# Patient Record
Sex: MALE | Race: BLACK OR AFRICAN AMERICAN | Employment: FULL TIME | URBAN - METROPOLITAN AREA
[De-identification: names, ages, dates, MRNs, and addresses within clinical notes are randomized per-mention and may not be internally consistent; named-entity substitution may affect disease eponyms.]

---

## 2017-08-22 ENCOUNTER — HOSPITAL ENCOUNTER (INPATIENT)
Age: 47
LOS: 3 days | Discharge: HOME | DRG: 872 | End: 2017-08-25
Attending: STUDENT IN AN ORGANIZED HEALTH CARE EDUCATION/TRAINING PROGRAM | Admitting: INTERNAL MEDICINE
Payer: COMMERCIAL

## 2017-08-22 DIAGNOSIS — R53.83 LETHARGY: ICD-10-CM

## 2017-08-22 DIAGNOSIS — A04.72 CLOSTRIDIUM DIFFICILE DIARRHEA: ICD-10-CM

## 2017-08-22 DIAGNOSIS — D69.6 THROMBOCYTOPENIA (HCC): Primary | ICD-10-CM

## 2017-08-22 DIAGNOSIS — R19.7 DIARRHEA, UNSPECIFIED TYPE: ICD-10-CM

## 2017-08-22 DIAGNOSIS — D61.818 PANCYTOPENIA (HCC): ICD-10-CM

## 2017-08-22 LAB
ALBUMIN SERPL-MCNC: 3.6 G/DL (ref 3.5–5)
ALBUMIN/GLOB SERPL: 0.9 {RATIO} (ref 1.1–2.2)
ALP SERPL-CCNC: 122 U/L (ref 45–117)
ALT SERPL-CCNC: 52 U/L (ref 12–78)
ANION GAP SERPL CALC-SCNC: 10 MMOL/L (ref 5–15)
AST SERPL-CCNC: 116 U/L (ref 15–37)
BILIRUB SERPL-MCNC: 1.9 MG/DL (ref 0.2–1)
BUN SERPL-MCNC: 12 MG/DL (ref 6–20)
BUN/CREAT SERPL: 11 (ref 12–20)
CALCIUM SERPL-MCNC: 8.8 MG/DL (ref 8.5–10.1)
CHLORIDE SERPL-SCNC: 98 MMOL/L (ref 97–108)
CO2 SERPL-SCNC: 29 MMOL/L (ref 21–32)
CREAT SERPL-MCNC: 1.09 MG/DL (ref 0.7–1.3)
ETHANOL SERPL-MCNC: <10 MG/DL
GLOBULIN SER CALC-MCNC: 4 G/DL (ref 2–4)
GLUCOSE SERPL-MCNC: 88 MG/DL (ref 65–100)
POTASSIUM SERPL-SCNC: 3.7 MMOL/L (ref 3.5–5.1)
PROT SERPL-MCNC: 7.6 G/DL (ref 6.4–8.2)
SODIUM SERPL-SCNC: 137 MMOL/L (ref 136–145)

## 2017-08-22 PROCEDURE — 36415 COLL VENOUS BLD VENIPUNCTURE: CPT | Performed by: EMERGENCY MEDICINE

## 2017-08-22 PROCEDURE — 85025 COMPLETE CBC W/AUTO DIFF WBC: CPT | Performed by: EMERGENCY MEDICINE

## 2017-08-22 PROCEDURE — 74011250636 HC RX REV CODE- 250/636: Performed by: STUDENT IN AN ORGANIZED HEALTH CARE EDUCATION/TRAINING PROGRAM

## 2017-08-22 PROCEDURE — 80053 COMPREHEN METABOLIC PANEL: CPT | Performed by: EMERGENCY MEDICINE

## 2017-08-22 PROCEDURE — 65270000029 HC RM PRIVATE

## 2017-08-22 PROCEDURE — 99283 EMERGENCY DEPT VISIT LOW MDM: CPT

## 2017-08-22 PROCEDURE — 80307 DRUG TEST PRSMV CHEM ANLYZR: CPT | Performed by: EMERGENCY MEDICINE

## 2017-08-22 RX ORDER — THERA TABS 400 MCG
1 TAB ORAL DAILY
COMMUNITY

## 2017-08-22 RX ADMIN — SODIUM CHLORIDE 1000 ML: 900 INJECTION, SOLUTION INTRAVENOUS at 19:46

## 2017-08-22 NOTE — ED PROVIDER NOTES
HPI Comments: 56 y.o. male with no significant past medical history who presents from home with chief complaint of fatigue. Pt complains of fatigue with associated diarrhea, dry mouth, and decreased PO for the past 2 weeks. Pt describes inability to tolerate anything PO. Today when pt got to work he was sent home due to increased fatigue/generalized weakness, and his sister brought him to [facility] ED for further evaluation. Pt notes unintended ~40 pound weight loss within the past year. Pt reports hx of alcoholism, but reports 5 days without any EtOH, and denies withdrawal sxs. Pt denies any PMHx or regular medications. Pt denies tremors, gum bleeding, or any other bleeding. Pt denies noticing any bruising or insect bites. There are no other acute medical concerns at this time. Social hx: Current tobacco smoker; History of EtOH abuse (quit 5 days PTA); no illicit drug use. 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 Pt is not currently sexually active,  with 2 children. PCP: None    Note written by Sulma. Meenakshi Speaks, as dictated by Monica Snowden MD 7:00 PM      The history is provided by the patient. No  was used. History reviewed. No pertinent past medical history. History reviewed. No pertinent surgical history. History reviewed. No pertinent family history. Social History     Social History    Marital status:      Spouse name: N/A    Number of children: N/A    Years of education: N/A     Occupational History    Not on file.      Social History Main Topics    Smoking status: Current Every Day Smoker     Packs/day: 0.10    Smokeless tobacco: Never Used    Alcohol use Yes      Comment: every other day    Drug use: No    Sexual activity: Not on file     Other Topics Concern    Not on file     Social History Narrative         ALLERGIES: Aspirin and Pcn [penicillins]    Review of Systems   Constitutional: Positive for appetite change (decreased), fatigue and unexpected weight change (40 lb weight loss). Negative for chills, diaphoresis and fever. HENT: Negative for congestion, rhinorrhea, sinus pressure, sore throat, trouble swallowing and voice change. Eyes: Negative for photophobia and visual disturbance. Respiratory: Negative for cough, chest tightness and shortness of breath. Cardiovascular: Negative for chest pain, palpitations and leg swelling. Gastrointestinal: Positive for diarrhea. Negative for abdominal pain, blood in stool, constipation, nausea and vomiting. Musculoskeletal: Negative for arthralgias, myalgias and neck pain. Neurological: Negative for dizziness, weakness, light-headedness, numbness and headaches. Vitals:    08/22/17 1707   BP: (!) 134/92   Pulse: 93   Resp: 17   Temp: 99.4 °F (37.4 °C)   SpO2: 100%   Weight: 54 kg (119 lb 2 oz)   Height: 5' 6.5\" (1.689 m)            Physical Exam   Constitutional: He is oriented to person, place, and time. No distress. Pt is cachectic and ill-appearing    HENT:   Head: Normocephalic and atraumatic. Nose: Nose normal.   Mouth/Throat: Oropharynx is clear and moist. No oropharyngeal exudate. Eyes: Conjunctivae and EOM are normal. Right eye exhibits no discharge. Left eye exhibits no discharge. No scleral icterus. Neck: Normal range of motion. Neck supple. No JVD present. No tracheal deviation present. No thyromegaly present. Cardiovascular: Normal rate, regular rhythm, normal heart sounds and intact distal pulses. Exam reveals no gallop and no friction rub. No murmur heard. Pulmonary/Chest: Effort normal and breath sounds normal. No stridor. No respiratory distress. He has no wheezes. He has no rales. He exhibits no tenderness. Abdominal: Bowel sounds are normal. He exhibits no distension and no mass. There is no tenderness. There is no rebound. Musculoskeletal: Normal range of motion. He exhibits no edema or tenderness.    Lymphadenopathy:     He has no cervical adenopathy. Neurological: He is alert and oriented to person, place, and time. No cranial nerve deficit. Coordination normal.   Skin: Skin is warm and dry. No rash noted. He is not diaphoretic. No erythema. No pallor. Psychiatric: He has a normal mood and affect. His behavior is normal. Judgment and thought content normal.   Note written by Sulma. Doyne Galeazzi, as dictated by Anu Capps MD 9:22 PM         MDM  Number of Diagnoses or Management Options  Lethargy: Thrombocytopenia ([address]):      Amount and/or Complexity of Data Reviewed  Clinical lab tests: ordered and reviewed  Review and summarize past medical records: yes  Discuss the patient with other providers: yes    Risk of Complications, Morbidity, and/or Mortality  Presenting problems: moderate  Diagnostic procedures: moderate  Management options: moderate    Patient Progress  Patient progress: stable    ED Course       Procedures  CONSULT NOTE:  7:43 PM Anu Capps MD spoke with Dr. Laxmi Kumar, Consult for Hospitalist.  Discussed available diagnostic tests and clinical findings. He will admit pt.     4:50 PM  Patient is being admitted to the hospital.  The results of their tests and reasons for their admission have been discussed with them and/or available family. They convey agreement and understanding for the need to be admitted and for their admission diagnosis. Consultation has been made with the inpatient physician specialist for hospitalization. LABORATORY TESTS:  No results found for this or any previous visit (from the past 12 hour(s)). IMAGING RESULTS:  CT BX BONE MARROW NDL/TROCAR   Final Result      NM BONE SCAN [facility] BODY   Final Result      CT CHEST W CONT   Final Result      CT ABD PELV W CONT   Final Result        No results found.     MEDICATIONS GIVEN:  Medications   sodium chloride (NS) 0.9 % flush (not administered)   potassium chloride SR (KLOR-CON 10) tablet 40 mEq (40 mEq Oral Missed 8/24/17 1200) sodium chloride 0.9 % bolus infusion 1,000 mL (0 mL IntraVENous IV Completed 8/23/17 0851)   magnesium sulfate 2 g/50 ml IVPB (premix or compounded) (0 g IntraVENous IV Completed 8/23/17 0959)   potassium phosphate 15 mmol in 0.9% sodium chloride 250 mL infusion ( IntraVENous Given 8/23/17 0958)   sodium chloride 0.9 % bolus infusion 100 mL (100 mL IntraVENous New Bag 8/23/17 1057)   iopamidol (ISOVUE-370) 76 % injection 100 mL (100 mL IntraVENous Given 8/23/17 1057)   iohexol (OMNIPAQUE) solution 50 mL (50 mL Oral Given 8/23/17 1057)   sodium chloride (NS) flush 10 mL (10 mL IntraVENous Given 8/23/17 1057)   potassium phosphate 15 mmol in 0.9% sodium chloride 250 mL infusion ( IntraVENous Given 8/24/17 1516)   technetium medronate (Tc-MDP) injection 18.9 millicurie (17.2 millicuries IntraVENous Given 8/24/17 0820)   lidocaine (XYLOCAINE) 10 mg/mL (1 %) injection 10 mL (10 mL SubCUTAneous Given by Provider 8/24/17 1352)   potassium, sodium phosphates (NEUTRA-PHOS) packet 1 Packet (1 Packet Oral Given 8/25/17 1050)   magnesium sulfate 1 g/100 ml IVPB (premix or compounded) (1 g IntraVENous New Bag 8/25/17 1050)       IMPRESSION:  1. Thrombocytopenia ([address]    2. Lethargy    3. Diarrhea, unspecified type    4. Pancytopenia (HCC)    5. Clostridium difficile diarrhea        PLAN:  1.  Admit to Yoli Palencia MD

## 2017-08-22 NOTE — IP AVS SNAPSHOT
[address] 
[phone] Patient: Corie Carrillo MRN: [id number] HTE:[birth year] You are allergic to the following Allergen Reactions Aspirin Rash Recent Documentation Height Weight BMI Smoking Status 1.689 m 54 kg 18.94 kg/m2 Current Every Day Smoker Unresulted Labs Order Current Status OVA & PARASITES, STOOL In process CULTURE, BLOOD, PAIRED Preliminary result Emergency Contacts Name Discharge Info Relation Home Work Mobile Garland Rivero DISCHARGE CAREGIVER [3] Spouse [3] [phone] [facility]  Sister [23] [phone] About your hospitalization You were admitted on:  August 22, 2017 You last received care in the:  [address] [place] [address] You were discharged on:  August 25, 2017 Unit phone number:  [phone] Why you were hospitalized Your primary diagnosis was:  Clostridium Difficile Diarrhea Your diagnoses also included:  Diarrhea, Hypokalemia, Hypomagnesemia, Hypophosphatemia, Elevated Lipase, Pancytopenia (Hcc), Sepsis (Hcc) Providers Seen During Your Hospitalizations Provider Role Specialty Primary office phone Nicole Calle MD Attending Provider Emergency Medicine [phone] Elly Biswas MD Attending Provider Internal Medicine [phone] Raven Wright MD Attending Provider Internal Medicine [phone] Michael Madison MD Attending Provider Internal Medicine [phone] Your Primary Care Physician (PCP) Primary Care Physician Office Phone Office Fax NONE ** None ** ** None ** Follow-up Information Follow up With Details Comments Contact Info Luz Kowalski MD In 1 week f/u  1-2 wks, to review BM bx results [address] [facility] [address] [place] [address] 
[phone] Arnold Hadley MD   [address] [facility] [address] [place] [id number] [phone] Current Discharge Medication List  
  
START taking these medications Dose & Instructions Dispensing Information Comments Morning Noon Evening Bedtime  
 metroNIDAZOLE 500 mg tablet Commonly known as:  FLAGYL Your last dose was: Your next dose is:    
   
   
 Dose:  500 mg Take 1 Tab by mouth three (3) times daily. Quantity:  30 Tab Refills:  0 CONTINUE these medications which have NOT CHANGED Dose & Instructions Dispensing Information Comments Morning Noon Evening Bedtime THERA tablet Generic drug:  therapeutic multivitamin Your last dose was: Your next dose is:    
   
   
 Dose:  1 Tab Take 1 Tab by mouth daily. Refills:  0 Where to Get Your Medications Information on where to get these meds will be given to you by the nurse or doctor. ! Ask your nurse or doctor about these medications  
  metroNIDAZOLE 500 mg tablet Discharge Instructions Discharge Instructions PATIENT ID: Corine Rojas MRN: [id number] YOB: 1970 DATE OF ADMISSION: 8/22/2017  6:15 PM   
DATE OF DISCHARGE: 8/25/2017 PRIMARY CARE PROVIDER: None ATTENDING PHYSICIAN: Soumya Snowden MD 
DISCHARGING PROVIDER: Soumya Snowden MD   
To contact this individual call [phone] and ask the  to page. If unavailable ask to be transferred the Adult Hospitalist Department. DISCHARGE DIAGNOSES  
c diff Ascites 
 elevated liver enzymes Lytic bone lesions CONSULTATIONS: IP CONSULT TO HEMATOLOGY 
IP CONSULT TO GASTROENTEROLOGY PROCEDURES/SURGERIES: * No surgery found * PENDING TEST RESULTS:  
At the time of discharge the following test results are still pending:BM bx results FOLLOW UP APPOINTMENTS:  
Follow-up Information Follow up With Details Comments Contact Info Lili Ashley MD In 1 week f/u  1-2 wks, to review BM bx results [address] [facility] [address] [place] [address] 
[phone] Michael Iyer MD   [address] [facility] [address] [place] [address] 
[phone] ADDITIONAL CARE RECOMMENDATIONS:  
-pend BM bx results 
-complete flagyl course for c diff Need CMP labs drawn 1 wk DIET: GI lite, low fiber ACTIVITY: Activity as tolerated WOUND CARE: none EQUIPMENT needed: none DISCHARGE MEDICATIONS: 
 See Medication Reconciliation Form · It is important that you take the medication exactly as they are prescribed. · Keep your medication in the bottles provided by the pharmacist and keep a list of the medication names, dosages, and times to be taken in your wallet. · Do not take other medications without consulting your doctor. NOTIFY YOUR PHYSICIAN FOR ANY OF THE FOLLOWING:  
Fever over 101 degrees for 24 hours. Chest pain, shortness of breath, fever, chills, nausea, vomiting, diarrhea, change in mentation, falling, weakness, bleeding. Severe pain or pain not relieved by medications. Or, any other signs or symptoms that you may have questions about. DISPOSITION: 
 x Home With: 
 OT  PT  [place]  RN  
  
 SNF/Inpatient Rehab/LTAC Independent/assisted living Hospice Other: CDMP Checked:  
Yes x PROBLEM LIST Updated: 
Yes x Signed:  
Devin Basurto MD 
8/25/2017 
6:18 PM 
 
 
Discharge Instructions Attachments/References LOW-FIBER DIET (ENGLISH) Discharge Orders None [employer] Announcement We are excited to announce that we are making your provider's discharge notes available to you in [employer]. You will see these notes when they are completed and signed by the physician that discharged you from your recent hospital stay.   If you have any questions or concerns about any information you see in [employer]hart, please call the [employer] Information Department where you were seen or reach out to your Primary Care Provider for more information about your plan of care. Introducing [facility]! Temit[employer] Mota introduces GreatCall patient portal. Now you can access parts of your medical record, email your doctor's office, and request medication refills online. 1. In your internet browser, go to [email or web address] 2. Click on the First Time User? Click Here link in the Sign In box. You will see the New Member Sign Up page. 3. Enter your GreatCall Access Code exactly as it appears below. You will not need to use this code after youve completed the sign-up process. If you do not sign up before the expiration date, you must request a new code. · GreatCall Access Code: [email or web address] Expires: 11/21/2017 11:55 AM 
 
4. Enter the last four digits of your Social Security Number (xxxx) and Date of Birth (mm/dd/yyyy) as indicated and click Submit. You will be taken to the next sign-up page. 5. Create a GreatCall ID. This will be your GreatCall login ID and cannot be changed, so think of one that is secure and easy to remember. 6. Create a GreatCall password. You can change your password at any time. 7. Enter your Password Reset Question and Answer. This can be used at a later time if you forget your password. 8. Enter your e-mail address. You will receive e-mail notification when new information is available in [address] 9. Click Sign Up. You can now view and download portions of your medical record. 10. Click the Download Summary menu link to download a portable copy of your medical information. If you have questions, please visit the Frequently Asked Questions section of the GreatCall website. Remember, GreatCall is NOT to be used for urgent needs. For medical emergencies, dial 911. Now available from your iPhone and Android! General Information Please provide this summary of care documentation to your next provider. Patient Signature:  ____________________________________________________________ Date:  ____________________________________________________________  
  
[facility] Provider Signature:  ____________________________________________________________ Date:  ____________________________________________________________ More Information Low-Fiber Diet: Care Instructions Your Care Instructions When your bowels are irritated, you may need to limit fiber in your diet until the problem clears up. Your doctor and dietitian can help you design a low-fiber diet based on your health and what you prefer to eat. Ask your doctor how long you should stay on a low-fiber diet. Your doctor probably will have you start adding more fiber to your diet as you get better. Always talk with your doctor or dietitian before you make changes in your diet. Follow-up care is a key part of your treatment and safety. Be sure to make and go to all appointments, and call your doctor if you are having problems. Its also a good idea to know your test results and keep a list of the medicines you take. How can you care for yourself at home? · Choose white or refined grains, and avoid whole grains. That means eating white or refined cereals, breads, crackers, rice, or pasta. · Peel the skin from fruits and vegetables before you eat or cook them. Avoid eating skins, seeds, and hulls. ¨ Eat frozen or canned fruit. Low-fiber fruits include applesauce, ripe bananas, and fruit juice without pulp. ¨ Eat low-fiber vegetables, which include well-cooked vegetables and vegetable juice. · Drink or eat milk, yogurt, or other milk products, if you can digest dairy without too many problems. Your doctor may limit milk and milk products for a while. If so, he or she may recommend a calcium and vitamin D supplement. · Eat well-cooked meat, such as chicken, turkey, fish, or lean meat. You also can eat eggs and tofu. · Avoid these foods: 
¨ Bran, brown or wild rice, oatmeal, granola, corn, ekta crackers, barley, and whole wheat and other whole-grain breads, such as rye bread ¨ Cereals with more than 3 grams of fiber a serving ¨ Berries, rhubarb, prunes, prune juice, and all dried fruits ¨ Raw vegetables ¨ Cabbage, broccoli, brussels sprouts, and cauliflower ¨ Cooked dried beans, lentils, and split peas ¨ Crunchy peanut butter ¨ Ice cream with fruit pieces in it ¨ Coconut, nuts, popcorn, raisins, and seeds, or any ice cream, yogurt, or cheese with these in them Where can you learn more? Go to http://vivian-alem.info/. Enter L808 in the search box to learn more about \"Low-Fiber Diet: Care Instructions. \" Current as of: July 26, 2016 Content Version: 11.3 © 3032-3603 [email or web address]. Care instructions adapted under license by [employer] (which disclaims liability or warranty for this information). If you have questions about a medical condition or this instruction, always ask your healthcare professional. [address] any warranty or liability for your use of this information.

## 2017-08-22 NOTE — PROGRESS NOTES
Admission Medication Reconciliation:    Information obtained from: patient     Significant PMH/Disease States:   History reviewed. No pertinent past medical history. Chief Complaint for this Admission:  fatigue     Allergies:  Aspirin    Prior to Admission Medications:   Prior to Admission Medications   Prescriptions Last Dose Informant Patient Reported? Taking? therapeutic multivitamin (THERA) tablet   Yes Yes   Sig: Take 1 Tab by mouth daily. Facility-Administered Medications: None         Comments/Recommendations: Patient denies taking prescription medications. I updated his allergy information by deleting penicillin. He reports that he's never had a reaction to penicillin but lists it as an allergy because his mother had hives with penicillin.

## 2017-08-23 ENCOUNTER — TELEPHONE (OUTPATIENT)
Dept: ONCOLOGY | Age: 47
End: 2017-08-23

## 2017-08-23 ENCOUNTER — APPOINTMENT (OUTPATIENT)
Dept: CT IMAGING | Age: 47
DRG: 872 | End: 2017-08-23
Attending: INTERNAL MEDICINE
Payer: COMMERCIAL

## 2017-08-23 LAB
ALBUMIN SERPL-MCNC: 2.9 G/DL (ref 3.5–5)
ALBUMIN/GLOB SERPL: 0.9 {RATIO} (ref 1.1–2.2)
ALP SERPL-CCNC: 105 U/L (ref 45–117)
ALT SERPL-CCNC: 44 U/L (ref 12–78)
AMYLASE SERPL-CCNC: 126 U/L (ref 25–115)
ANION GAP SERPL CALC-SCNC: 8 MMOL/L (ref 5–15)
APPEARANCE UR: CLEAR
AST SERPL-CCNC: 102 U/L (ref 15–37)
ATRIAL RATE: 60 BPM
BACTERIA URNS QL MICRO: NEGATIVE /HPF
BASOPHILS # BLD: 0 K/UL (ref 0–0.1)
BASOPHILS # BLD: 0 K/UL (ref 0–0.1)
BASOPHILS NFR BLD: 0 % (ref 0–1)
BASOPHILS NFR BLD: 1 % (ref 0–1)
BILIRUB SERPL-MCNC: 1.6 MG/DL (ref 0.2–1)
BILIRUB UR QL: NEGATIVE
BUN SERPL-MCNC: 12 MG/DL (ref 6–20)
BUN/CREAT SERPL: 13 (ref 12–20)
C DIFF TOX GENS STL QL NAA+PROBE: POSITIVE
CALCIUM SERPL-MCNC: 8 MG/DL (ref 8.5–10.1)
CALCULATED P AXIS, ECG09: 87 DEGREES
CALCULATED R AXIS, ECG10: 83 DEGREES
CALCULATED T AXIS, ECG11: 65 DEGREES
CHLORIDE SERPL-SCNC: 102 MMOL/L (ref 97–108)
CO2 SERPL-SCNC: 28 MMOL/L (ref 21–32)
COLOR UR: ABNORMAL
CREAT SERPL-MCNC: 0.96 MG/DL (ref 0.7–1.3)
DIAGNOSIS, 93000: NORMAL
DIFFERENTIAL METHOD BLD: ABNORMAL
DIFFERENTIAL METHOD BLD: ABNORMAL
EOSINOPHIL # BLD: 0 K/UL (ref 0–0.4)
EOSINOPHIL # BLD: 0 K/UL (ref 0–0.4)
EOSINOPHIL NFR BLD: 0 % (ref 0–7)
EOSINOPHIL NFR BLD: 1 % (ref 0–7)
EPITH CASTS URNS QL MICRO: ABNORMAL /LPF
ERYTHROCYTE [DISTWIDTH] IN BLOOD BY AUTOMATED COUNT: 12.1 % (ref 11.5–14.5)
ERYTHROCYTE [DISTWIDTH] IN BLOOD BY AUTOMATED COUNT: 12.3 % (ref 11.5–14.5)
FERRITIN SERPL-MCNC: 1531 NG/ML (ref 26–388)
FOLATE SERPL-MCNC: 24.7 NG/ML (ref 5–21)
GLOBULIN SER CALC-MCNC: 3.3 G/DL (ref 2–4)
GLUCOSE SERPL-MCNC: 69 MG/DL (ref 65–100)
GLUCOSE UR STRIP.AUTO-MCNC: NEGATIVE MG/DL
HCT VFR BLD AUTO: 30.3 % (ref 36.6–50.3)
HCT VFR BLD AUTO: 33.4 % (ref 36.6–50.3)
HEMOCCULT STL QL: NEGATIVE
HGB BLD-MCNC: 10.5 G/DL (ref 12.1–17)
HGB BLD-MCNC: 11.4 G/DL (ref 12.1–17)
HGB UR QL STRIP: NEGATIVE
HYALINE CASTS URNS QL MICRO: ABNORMAL /LPF (ref 0–5)
IRON SATN MFR SERPL: 30 % (ref 20–50)
IRON SERPL-MCNC: 51 UG/DL (ref 35–150)
IRON SERPL-MCNC: 52 UG/DL (ref 35–150)
KETONES UR QL STRIP.AUTO: ABNORMAL MG/DL
LEUKOCYTE ESTERASE UR QL STRIP.AUTO: NEGATIVE
LIPASE SERPL-CCNC: 621 U/L (ref 73–393)
LYMPHOCYTES # BLD: 0.6 K/UL (ref 0.8–3.5)
LYMPHOCYTES # BLD: 0.6 K/UL (ref 0.8–3.5)
LYMPHOCYTES NFR BLD: 24 % (ref 12–49)
LYMPHOCYTES NFR BLD: 25 % (ref 12–49)
MAGNESIUM SERPL-MCNC: 1.3 MG/DL (ref 1.6–2.4)
MCH RBC QN AUTO: 35.6 PG (ref 26–34)
MCH RBC QN AUTO: 36 PG (ref 26–34)
MCHC RBC AUTO-ENTMCNC: 34.1 G/DL (ref 30–36.5)
MCHC RBC AUTO-ENTMCNC: 34.7 G/DL (ref 30–36.5)
MCV RBC AUTO: 103.8 FL (ref 80–99)
MCV RBC AUTO: 104.4 FL (ref 80–99)
MONOCYTES # BLD: 0.2 K/UL (ref 0–1)
MONOCYTES # BLD: 0.2 K/UL (ref 0–1)
MONOCYTES NFR BLD: 7 % (ref 5–13)
MONOCYTES NFR BLD: 8 % (ref 5–13)
NEUTS SEG # BLD: 1.7 K/UL (ref 1.8–8)
NEUTS SEG # BLD: 1.7 K/UL (ref 1.8–8)
NEUTS SEG NFR BLD: 67 % (ref 32–75)
NEUTS SEG NFR BLD: 67 % (ref 32–75)
NITRITE UR QL STRIP.AUTO: NEGATIVE
P-R INTERVAL, ECG05: 142 MS
PATH REV BLD -IMP: ABNORMAL
PH UR STRIP: 6 [PH] (ref 5–8)
PHOSPHATE SERPL-MCNC: 2.2 MG/DL (ref 2.6–4.7)
PLATELET # BLD AUTO: 28 K/UL (ref 150–400)
PLATELET # BLD AUTO: 33 K/UL (ref 150–400)
POTASSIUM SERPL-SCNC: 3.4 MMOL/L (ref 3.5–5.1)
PROT SERPL-MCNC: 6.2 G/DL (ref 6.4–8.2)
PROT UR STRIP-MCNC: NEGATIVE MG/DL
Q-T INTERVAL, ECG07: 428 MS
QRS DURATION, ECG06: 70 MS
QTC CALCULATION (BEZET), ECG08: 428 MS
RBC # BLD AUTO: 2.92 M/UL (ref 4.1–5.7)
RBC # BLD AUTO: 3.2 M/UL (ref 4.1–5.7)
RBC #/AREA URNS HPF: ABNORMAL /HPF (ref 0–5)
RBC MORPH BLD: ABNORMAL
SODIUM SERPL-SCNC: 138 MMOL/L (ref 136–145)
SP GR UR REFRACTOMETRY: 1.01 (ref 1–1.03)
TIBC SERPL-MCNC: 175 UG/DL (ref 250–450)
TSH SERPL DL<=0.05 MIU/L-ACNC: 2.31 UIU/ML (ref 0.36–3.74)
UA: UC IF INDICATED,UAUC: ABNORMAL
UROBILINOGEN UR QL STRIP.AUTO: 0.2 EU/DL (ref 0.2–1)
VENTRICULAR RATE, ECG03: 60 BPM
VIT B12 SERPL-MCNC: 491 PG/ML (ref 211–911)
WBC # BLD AUTO: 2.5 K/UL (ref 4.1–11.1)
WBC # BLD AUTO: 2.5 K/UL (ref 4.1–11.1)
WBC MORPH BLD: ABNORMAL
WBC MORPH BLD: ABNORMAL
WBC URNS QL MICRO: ABNORMAL /HPF (ref 0–4)

## 2017-08-23 PROCEDURE — 82746 ASSAY OF FOLIC ACID SERUM: CPT | Performed by: INTERNAL MEDICINE

## 2017-08-23 PROCEDURE — 83540 ASSAY OF IRON: CPT | Performed by: INTERNAL MEDICINE

## 2017-08-23 PROCEDURE — 87040 BLOOD CULTURE FOR BACTERIA: CPT | Performed by: NURSE PRACTITIONER

## 2017-08-23 PROCEDURE — 84156 ASSAY OF PROTEIN URINE: CPT | Performed by: NURSE PRACTITIONER

## 2017-08-23 PROCEDURE — 74011000250 HC RX REV CODE- 250: Performed by: INTERNAL MEDICINE

## 2017-08-23 PROCEDURE — 82607 VITAMIN B-12: CPT | Performed by: INTERNAL MEDICINE

## 2017-08-23 PROCEDURE — 83690 ASSAY OF LIPASE: CPT | Performed by: INTERNAL MEDICINE

## 2017-08-23 PROCEDURE — 83883 ASSAY NEPHELOMETRY NOT SPEC: CPT | Performed by: NURSE PRACTITIONER

## 2017-08-23 PROCEDURE — 74011250636 HC RX REV CODE- 250/636: Performed by: INTERNAL MEDICINE

## 2017-08-23 PROCEDURE — 74011250636 HC RX REV CODE- 250/636: Performed by: NURSE PRACTITIONER

## 2017-08-23 PROCEDURE — 74011636320 HC RX REV CODE- 636/320: Performed by: INTERNAL MEDICINE

## 2017-08-23 PROCEDURE — 89055 LEUKOCYTE ASSESSMENT FECAL: CPT | Performed by: INTERNAL MEDICINE

## 2017-08-23 PROCEDURE — 82150 ASSAY OF AMYLASE: CPT | Performed by: INTERNAL MEDICINE

## 2017-08-23 PROCEDURE — 36415 COLL VENOUS BLD VENIPUNCTURE: CPT | Performed by: INTERNAL MEDICINE

## 2017-08-23 PROCEDURE — 93005 ELECTROCARDIOGRAM TRACING: CPT

## 2017-08-23 PROCEDURE — 82728 ASSAY OF FERRITIN: CPT | Performed by: INTERNAL MEDICINE

## 2017-08-23 PROCEDURE — 80053 COMPREHEN METABOLIC PANEL: CPT | Performed by: INTERNAL MEDICINE

## 2017-08-23 PROCEDURE — 85025 COMPLETE CBC W/AUTO DIFF WBC: CPT | Performed by: INTERNAL MEDICINE

## 2017-08-23 PROCEDURE — 71260 CT THORAX DX C+: CPT

## 2017-08-23 PROCEDURE — 87493 C DIFF AMPLIFIED PROBE: CPT | Performed by: INTERNAL MEDICINE

## 2017-08-23 PROCEDURE — 74011000250 HC RX REV CODE- 250: Performed by: NURSE PRACTITIONER

## 2017-08-23 PROCEDURE — 87045 FECES CULTURE AEROBIC BACT: CPT | Performed by: INTERNAL MEDICINE

## 2017-08-23 PROCEDURE — 84100 ASSAY OF PHOSPHORUS: CPT | Performed by: INTERNAL MEDICINE

## 2017-08-23 PROCEDURE — 87177 OVA AND PARASITES SMEARS: CPT | Performed by: INTERNAL MEDICINE

## 2017-08-23 PROCEDURE — 74011000258 HC RX REV CODE- 258: Performed by: INTERNAL MEDICINE

## 2017-08-23 PROCEDURE — 65270000029 HC RM PRIVATE

## 2017-08-23 PROCEDURE — 84443 ASSAY THYROID STIM HORMONE: CPT | Performed by: INTERNAL MEDICINE

## 2017-08-23 PROCEDURE — 84165 PROTEIN E-PHORESIS SERUM: CPT | Performed by: NURSE PRACTITIONER

## 2017-08-23 PROCEDURE — 82272 OCCULT BLD FECES 1-3 TESTS: CPT | Performed by: INTERNAL MEDICINE

## 2017-08-23 PROCEDURE — 83735 ASSAY OF MAGNESIUM: CPT | Performed by: INTERNAL MEDICINE

## 2017-08-23 PROCEDURE — 81001 URINALYSIS AUTO W/SCOPE: CPT | Performed by: NURSE PRACTITIONER

## 2017-08-23 PROCEDURE — 74177 CT ABD & PELVIS W/CONTRAST: CPT

## 2017-08-23 RX ORDER — SODIUM CHLORIDE 0.9 % (FLUSH) 0.9 %
10 SYRINGE (ML) INJECTION
Status: COMPLETED | OUTPATIENT
Start: 2017-08-23 | End: 2017-08-23

## 2017-08-23 RX ORDER — SODIUM CHLORIDE 9 MG/ML
125 INJECTION, SOLUTION INTRAVENOUS CONTINUOUS
Status: DISCONTINUED | OUTPATIENT
Start: 2017-08-23 | End: 2017-08-24

## 2017-08-23 RX ORDER — MAGNESIUM SULFATE HEPTAHYDRATE 40 MG/ML
2 INJECTION, SOLUTION INTRAVENOUS ONCE
Status: COMPLETED | OUTPATIENT
Start: 2017-08-23 | End: 2017-08-23

## 2017-08-23 RX ORDER — LORAZEPAM 2 MG/ML
1 INJECTION INTRAMUSCULAR
Status: DISCONTINUED | OUTPATIENT
Start: 2017-08-23 | End: 2017-08-25 | Stop reason: HOSPADM

## 2017-08-23 RX ORDER — ACETAMINOPHEN 325 MG/1
650 TABLET ORAL
Status: DISCONTINUED | OUTPATIENT
Start: 2017-08-23 | End: 2017-08-25 | Stop reason: HOSPADM

## 2017-08-23 RX ORDER — IBUPROFEN 200 MG
1 TABLET ORAL DAILY PRN
Status: DISCONTINUED | OUTPATIENT
Start: 2017-08-23 | End: 2017-08-25 | Stop reason: HOSPADM

## 2017-08-23 RX ORDER — ONDANSETRON 2 MG/ML
4 INJECTION INTRAMUSCULAR; INTRAVENOUS
Status: DISCONTINUED | OUTPATIENT
Start: 2017-08-23 | End: 2017-08-25 | Stop reason: HOSPADM

## 2017-08-23 RX ORDER — SODIUM CHLORIDE 0.9 % (FLUSH) 0.9 %
SYRINGE (ML) INJECTION
Status: DISPENSED
Start: 2017-08-23 | End: 2017-08-23

## 2017-08-23 RX ORDER — IBUPROFEN 200 MG
1 TABLET ORAL EVERY 24 HOURS
Status: DISCONTINUED | OUTPATIENT
Start: 2017-08-23 | End: 2017-08-23

## 2017-08-23 RX ADMIN — FOLIC ACID: 5 INJECTION, SOLUTION INTRAMUSCULAR; INTRAVENOUS; SUBCUTANEOUS at 01:25

## 2017-08-23 RX ADMIN — POTASSIUM PHOSPHATE, MONOBASIC AND POTASSIUM PHOSPHATE, DIBASIC: 224; 236 INJECTION, SOLUTION INTRAVENOUS at 09:58

## 2017-08-23 RX ADMIN — MAGNESIUM SULFATE HEPTAHYDRATE 2 G: 40 INJECTION, SOLUTION INTRAVENOUS at 08:47

## 2017-08-23 RX ADMIN — IOPAMIDOL 100 ML: 755 INJECTION, SOLUTION INTRAVENOUS at 10:57

## 2017-08-23 RX ADMIN — SODIUM CHLORIDE 125 ML/HR: 900 INJECTION, SOLUTION INTRAVENOUS at 21:30

## 2017-08-23 RX ADMIN — IOHEXOL 50 ML: 240 INJECTION, SOLUTION INTRATHECAL; INTRAVASCULAR; INTRAVENOUS; ORAL at 10:57

## 2017-08-23 RX ADMIN — Medication 10 ML: at 10:57

## 2017-08-23 RX ADMIN — SODIUM CHLORIDE 125 ML/HR: 900 INJECTION, SOLUTION INTRAVENOUS at 08:47

## 2017-08-23 RX ADMIN — SODIUM CHLORIDE 100 ML: 900 INJECTION, SOLUTION INTRAVENOUS at 10:57

## 2017-08-23 NOTE — CONSULTS
Hematology/Oncology Consult    REASON FOR CONSULT: Pancytopenia  REQUESTED BY: Dr. Simone Du: Mr. Nati Bryson is a 56 y.o. male who we are asked to see in consultation for pancytopenia. Patient presented to the Emergency Room with diarrhea x 2 weeks. He denies any blood or mucous in his stool. He states that he has been feeling more fatigued recently. This has interfered with his work. States that his appetite has been decreased and he has lost 30 pounds in the last month or so. He has not been trying to lose weight. He has not been experiencing any nausea, vomiting, or abdominal pain. He denies any headaches, dizziness, or vision changes. He denies shortness of breath or chest pain. Denies hematuria or changes in voiding patterns. He denies recent travel out of the country, recent surgery/trauma, recent antibiotic use, or bleeding. Denies any recent blood product transfusions or exposure to heparin. No fevers. He states that he does not routinely follow with Primary Care. Describes himself as overall very healthy until a few weeks ago. No personal history of bleeding disorders or cancer. Denies family history of bleeding or clotting disorders. His father and both grandparents with cancer. States his father had prostate cancer but he is unsure of type of malignancy his grandfather's had. He smokes cigarettes socially per his report. States he has only smoked for about 2 years. Drinks alcohol. States that he drinks a few shots of liquor and a few beers every other day. Note history of heavy alcohol use per family reports. Denies illicit drug use. History reviewed. No pertinent past medical history. History reviewed. No pertinent surgical history.     Allergies   Allergen Reactions    Aspirin Rash       Current Facility-Administered Medications   Medication Dose Route Frequency Provider Last Rate Last Dose    0.9% sodium chloride infusion  125 mL/hr IntraVENous CONTINUOUS Charly Gillis  mL/hr at 08/23/17 0847 125 mL/hr at 08/23/17 0847    0.9% sodium chloride 1,157 mL with folic acid 1 mg, thiamine 100 mg, mvi, adult no. 4 with vit K 10 mL infusion   IntraVENous Q24H Charly Gillis MD        acetaminophen (TYLENOL) tablet 650 mg  650 mg Oral Q4H PRN Charly Gillis MD        ondansetron (ZOFRAN) injection 4 mg  4 mg IntraVENous Q4H PRN Charly Gillis MD        nicotine (NICODERM CQ) 21 mg/24 hr patch 1 Patch  1 Patch TransDERmal Q24H Deandre Shahid MD        LORazepam (ATIVAN) injection 1 mg  1 mg IntraVENous Q4H PRN Charly Gillis MD        sodium chloride (NS) 0.9 % flush                Social History     Social History    Marital status:      Spouse name: N/A    Number of children: N/A    Years of education: N/A     Social History Main Topics    Smoking status: Current Every Day Smoker     Packs/day: 0.10    Smokeless tobacco: Never Used    Alcohol use Yes      Comment: every other day    Drug use: No    Sexual activity: Not Asked     Other Topics Concern    None     Social History Narrative       History reviewed. No pertinent family history. ROS  As per the HPI, otherwise a comprehensive ROS is negative. ECOG PS is 1. Emotional well being addressed and patient is coping well.     Physical Examination:   Visit Vitals    /78 (BP 1 Location: Left arm, BP Patient Position: At rest)    Pulse (!) 59    Temp 99.1 °F (37.3 °C)    Resp 16    Ht 5' 6.5\" (1.689 m)    Wt 119 lb 2 oz (54 kg)    SpO2 99%    BMI 18.94 kg/m2     General appearance - alert, pleasant, conversant, no distress  Mental status - oriented to person, place, and time  Mouth - mucous membranes moist, pharynx normal without lesions  Neck - supple, no significant adenopathy  Lymphatics - no palpable lymphadenopathy, no hepatosplenomegaly  Chest - clear to auscultation, no wheezes, rales or rhonchi, symmetric air entry  Heart - normal rate, regular rhythm, normal S1, S2, no murmurs, rubs, clicks or gallops  Abdomen - soft, nontender, nondistended, no masses or organomegaly, bowel sounds present  Neurological - normal speech, no focal findings or movement disorder noted  Extremities - peripheral pulses normal, no pedal edema  Skin - warm, dry, intact    LABS  Lab Results   Component Value Date/Time    WBC 2.5 08/23/2017 04:19 AM    HGB 10.5 08/23/2017 04:19 AM    HCT 30.3 08/23/2017 04:19 AM    PLATELET 28 73/36/2932 04:19 AM    .8 08/23/2017 04:19 AM    ABS. NEUTROPHILS 1.7 08/23/2017 04:19 AM     Lab Results   Component Value Date/Time    Sodium 138 08/23/2017 04:19 AM    Potassium 3.4 08/23/2017 04:19 AM    Chloride 102 08/23/2017 04:19 AM    CO2 28 08/23/2017 04:19 AM    Glucose 69 08/23/2017 04:19 AM    BUN 12 08/23/2017 04:19 AM    Creatinine 0.96 08/23/2017 04:19 AM    GFR est AA >60 08/23/2017 04:19 AM    GFR est non-AA >60 08/23/2017 04:19 AM    Calcium 8.0 08/23/2017 04:19 AM     Lab Results   Component Value Date/Time    AST (SGOT) 102 08/23/2017 04:19 AM    Alk. phosphatase 105 08/23/2017 04:19 AM    Protein, total 6.2 08/23/2017 04:19 AM    Albumin 2.9 08/23/2017 04:19 AM    Globulin 3.3 08/23/2017 04:19 AM    A-G Ratio 0.9 08/23/2017 04:19 AM     Lab Results   Component Value Date/Time    Iron % saturation 30 08/23/2017 04:19 AM    TIBC 175 08/23/2017 04:19 AM    Ferritin 1531 08/23/2017 04:19 AM    Vitamin B12 491 08/23/2017 04:19 AM    Folate 24.7 08/23/2017 04:19 AM    TSH 2.31 08/23/2017 04:19 AM    Lipase 621 08/23/2017 04:19 AM     No results found for: INR, APTT, DDIMSQ, DDIME, [id number], [place]lam Kin, PRSFLT, [address], [address], [id number], [id number], [id number], [id number], [id number], [id number]  IMAGING  CT C/A/P 8/23/17  IMPRESSION:  1. Abdominal and pelvic ascites. 2. Lytic bone lesions of the pelvis, suspicious for possible multiple myeloma.     Initial Lab Work - pending    ASSESSMENT  Mr. Shari Rios is a 56 y.o. male with history of alcohol abuse who presented with diarrhea, fatigue, and weight loss and was found to have  new pancytopenia and lytic lesions on CT for which we are consulted. He also has a ascites of undetermined etiology    DISCUSSION/PLAN  1. Pancytopenia, acute. Preceded by a nonbloody diarrheal illness. Reviewed labs obtained by the primary team  Which have ruled out iron deficiency, B12 deficiency. With the presence of neutropenia, absence of renal failure suspicion for thrombotic microangiopathy is low, however we will rule this out. Findings from CT were noted and overall concerning for a bone marrow disorder-such as lymphoproliferative disorders or a aplasia. He has also lost a significant amount of weight, but has no other B symptoms    We will obtain LDH, haptoglobin, reticulocyte count, DIC panel, HIV and hepatitis serologies CEA, CA-19-9, serum and urine protein electrophoresis with immunofixation, free light chain assays. Peripheral smear ordered and pending. We will arrange for a bone marrow biopsy tomorrow    2. Lytic Lesions. Noted on CT C/A/P. Myeloma versus other metastatic malignancy- work up pending. 3. Diarrhea. Infectious work up pending. Appreciate Hospitalist management. 4. Hyperbilirubinemia , abdominal pancreatic enzymes and ascites : CT reviewed personally and notable for normal liver and spleen. He pancreas is also unremarkable. Please consult GI. Appreciate consultation and care of Mr. Pratima Valentino. We will follow along with you. Please call with any questions. Attending Physician Note:   Seen in conjunction with Griselda Mayer NP. Findings and concerns explained to his wife. Further workup as above. Consider GI evaluation.     Signed by: Ajith Powell MD                     August 23, 2017

## 2017-08-23 NOTE — TELEPHONE ENCOUNTER
Consult:    Lacy Joseph III  1970  Reason:  Pancytopenia   Dr. Ryan Cain [place]  Room: [id number]

## 2017-08-23 NOTE — PROGRESS NOTES
Hospitalist Progress Note  Silvestre Guerra NP  Office: [phone]  Cell: [phone] 7a-5p      Date of Service:  2017  NAME:  Dmitry Suazo  :  1970  MRN:  [id number]      Admission Summary:   47-yom without any significant pmh, who was in his usual state of health until about 2 weeks ago when the pt developed diarrhea, which is progressive, about 3-4 loose stools per day. This is associated with decreased appetite as well as a dry mouth. Pt has not been able to tolerate food as a result of the diarrhea. The diarrhea is not associated with abdominal pain. He has lost about 40 pounds within the past year. The weight loss was unintentional. It was reported by the sister that the patient has history of alcohol abuse. Pt denies that. His last alcohol consumption was 5 days ago. Pt also has significant weakness and fatigue. He was sent home from work today because of the increased weakness and fatigue. Pt denies fever, rigors, and chills. The diarrhea is not associated with nausea and vomiting. No recent history of travel, no sick contacts, the patient did not eat anything unusual.     Interval history / Subjective:     Patient reports two loose BMs today. Continue to reports fatigue, generalized weakness. Denies any nausea, vomiting, abdominal pain. Discussed CT findings and concern for multiple myeloma with patient and family in room. Discussed that oncology would be by to see patient shortly. Offered  to patient if he needs someone to talk to.       Assessment & Plan:     Diarrhea  -etiology unknown, possibly infectious bacterial vs viral vs s/e of alcohol withdrawal  -stool studies pending  -hold on abx for now  -IVF    Sepsis likely from diarrhea, possible viral vs bacterial  -fevers, pancytopenia on admission   -check blood cultures  -CT abd/pelvis/chest showing pelvic lytic lesions, no acute process  -IVF   -monitor labs, send UA    Wt loss   -related to the above vs h/o ETOH abuse vs possible multiple myeloma  -nutrition consult    Pancytopenia  -related to infectious process vs likely multiple myeloma  -hematology following, discussed with Chloe Batres Oncology NP, will likely need bone marrow biopsy   -CT abd/pelvis Lytic bone lesions of the pelvis, suspicious for possible multiple myeloma.  -send multiple myeloma labs  -check bone scan    Elevated Blood Pressure  -elevated on admission, now wnl  -monitor    Elevated Lipase  -possible pancreatitis given h/o ETOH use however denies any abdominal pain, n/v  -IVF, repeat labs in am    ETOH/Tobacco abuse  -reports last drink was last Friday  -Banana bag   -ativan prn   -nicotine patch prn   -cessation education provided    Code status: Full  DVT prophylaxis: SCDs    Care Plan discussed with: Patient/Family and Nurse Dr Joy Lindsey  Disposition: Home w/Family and TBD     Hospital Problems  Date Reviewed: 8/22/2017          Codes Class Noted POA    * (Principal)Diarrhea ICD-10-CM: R19.7  ICD-9-CM: 787.91  8/22/2017 Yes                Review of Systems:   A comprehensive review of systems was negative except for that written in the HPI. Vital Signs:    Last 24hrs VS reviewed since prior progress note. Most recent are:  Visit Vitals    /78 (BP 1 Location: Left arm, BP Patient Position: At rest)    Pulse (!) 59    Temp 99.1 °F (37.3 °C)    Resp 16    Ht 5' 6.5\" (1.689 m)    Wt 54 kg (119 lb 2 oz)    SpO2 99%    BMI 18.94 kg/m2         Intake/Output Summary (Last 24 hours) at 08/23/17 1329  Last data filed at 08/23/17 0900   Gross per 24 hour   Intake           997.92 ml   Output                0 ml   Net           997.92 ml        Physical Examination:             Constitutional:  No acute distress, cooperative, pleasant, thin ill appearing male   ENT:  Oral mucous moist, oropharynx benign. Neck supple,    Resp:  CTA bilaterally. No wheezing/rhonchi/rales.  No accessory muscle use   CV:  Regular rhythm, normal rate, no murmurs, gallops, rubs    GI:  Soft, non distended, non tender. normoactive bowel sounds, no hepatosplenomegaly     Musculoskeletal:  No edema, warm, 2+ pulses throughout    Neurologic:  Moves all extremities. AAOx3     Psych:  Good insight, Not anxious nor agitated. Data Review:    Review and/or order of clinical lab test  Review and/or order of tests in the radiology section of CPT  Review and/or order of tests in the medicine section of CPT      Labs:     Recent Labs      08/23/17 0419 08/22/17 1735   WBC  2.5*  2.5*   HGB  10.5*  11.4*   HCT  30.3*  33.4*   PLT  28*  33*     Recent Labs      08/23/17 0419 08/22/17 1735   NA  138  137   K  3.4*  3.7   CL  102  98   CO2  28  29   BUN  12  12   CREA  0.96  1.09   GLU  69  88   CA  8.0*  8.8   MG  1.3*   --    PHOS  2.2*   --      Recent Labs      08/23/17 0419 08/22/17 1735   SGOT  102*  116*   ALT  44  52   AP  105  122*   TBILI  1.6*  1.9*   TP  6.2*  7.6   ALB  2.9*  3.6   GLOB  3.3  4.0   AML  126*   --    LPSE  621*   --      No results for input(s): INR, PTP, APTT in the last 72 hours. No lab exists for component: INREXT   Recent Labs      08/23/17 0419   TIBC  175*   PSAT  30   FERR  1531*      Lab Results   Component Value Date/Time    Folate 24.7 08/23/2017 04:19 AM      No results for input(s): PH, PCO2, PO2 in the last 72 hours. No results for input(s): CPK, CKNDX, TROIQ in the last 72 hours.     No lab exists for component: CPKMB  No results found for: CHOL, CHOLX, CHLST, CHOLV, HDL, LDL, LDLC, DLDLP, TGLX, TRIGL, TRIGP, CHHD, CHHDX  No results found for: GLUCPOC  No results found for: COLOR, APPRN, SPGRU, REFSG, ONDINA, PROTU, GLUCU, KETU, BILU, UROU, VERONICA, LEUKU, GLUKE, EPSU, BACTU, WBCU, RBCU, CASTS, UCRY      Medications Reviewed:     Current Facility-Administered Medications   Medication Dose Route Frequency    0.9% sodium chloride infusion  125 mL/hr IntraVENous CONTINUOUS    0.9% sodium chloride 4,227 mL with folic acid 1 mg, thiamine 100 mg, mvi, adult no. 4 with vit K 10 mL infusion   IntraVENous Q24H    acetaminophen (TYLENOL) tablet 650 mg  650 mg Oral Q4H PRN    ondansetron (ZOFRAN) injection 4 mg  4 mg IntraVENous Q4H PRN    nicotine (NICODERM CQ) 21 mg/24 hr patch 1 Patch  1 Patch TransDERmal Q24H    LORazepam (ATIVAN) injection 1 mg  1 mg IntraVENous Q4H PRN    potassium phosphate 15 mmol in 0.9% sodium chloride 250 mL infusion   IntraVENous ONCE    sodium chloride (NS) 0.9 % flush         ______________________________________________________________________  EXPECTED LENGTH OF STAY: 2d 16h  ACTUAL LENGTH OF STAY:          [facility], NP

## 2017-08-23 NOTE — H&P
[address]   HISTORY AND PHYSICAL       Name:  Natalia Mar   MR#:  [id number]   :  1970   Account #:  [de-identified]        Date of Adm:  2017       PRIMARY CARE PHYSICIAN: None. SOURCE OF INFORMATION: The patient and the patient's sister, who   was present at the bedside. CHIEF COMPLAINT: Diarrhea. HISTORY OF PRESENT ILLNESS: This is a 16-year-old man without   any significant past medical history, who was in his usual state of   health until about 2 weeks ago when the patient developed diarrhea,   which is progressive, about 3-4 loose stools per day. This is associated   with decreased appetite as well as a dry mouth. The patient has not   been able to tolerate food as a result of the diarrhea. The diarrhea is   not associated with abdominal pain. He has lost about 40 pounds   within the past year. The weight loss was unintentional. It was reported   by the sister that the patient has history of alcohol abuse. The patient   denies that. His last alcohol consumption was 5 days ago. The patient   also has significant weakness and fatigue. He was sent home from   work today because of the increased weakness and fatigue. The   patient denies fever, rigors, and chills. The diarrhea is not associated   with nausea and vomiting. No recent history of travel, no sick   contacts, the patient did not eat anything unusual. When the patient   arrived at the emergency room, he was found to have significant lab   abnormalities, which include thrombocytopenia, anemia as well as   leukopenia. The patient was subsequently referred to the hospitalist   service for evaluation for admission. PAST MEDICAL HISTORY: Not significant. ALLERGIES: PATIENT IS ALLERGIC TO ASPIRIN. MEDICATIONS: Multivitamin 1 tablet daily. FAMILY HISTORY: This was reviewed. No family history of   gastrointestinal disease.     PAST SURGICAL HISTORY: Not significant. SOCIAL HISTORY: The patient smokes about half a pack of cigarettes   daily. Admits to daily consumption of alcohol except for the past 5   days. REVIEW OF SYSTEMS   HEAD, EYES, EARS, NOSE AND THROAT: No headache, no   dizziness, no blurring of vision, no photophobia. RESPIRATORY: No cough, no shortness of breath, no hemoptysis. CARDIOVASCULAR: No chest pain, no orthopnea, no palpitation. GASTROINTESTINAL: This is positive for diarrhea. No abdominal   pain. No nausea and vomiting, no constipation. GENITOURINARY: No dysuria, no urgency, and no frequency. All   other systems are reviewed and they are negative. PHYSICAL EXAMINATION   GENERAL APPEARANCE: The patient appeared ill, in moderate   distress. VITAL SIGNS: On arrival at the emergency room, temperature 99.4,   pulse 93, respiratory rate 17. Blood pressure 134/92, oxygen   saturation 100% on room air. HEAD: Normocephalic, atraumatic. EYES: Normal eye movements. No redness, no drainage, no   discharge. EARS: Normal external ears with no obvious drainage. NOSE: No deformities. No drainage. MOUTH/THROAT: No visible oral lesions. NECK: Supple. No JVD. No thyromegaly. CHEST: Clear breath sounds. No wheezing, no crackles. HEART: Normal S1 and S2, regular. No clinically appreciable murmur. ABDOMEN: Soft, nontender, normal bowel sounds. CENTRAL NERVOUS SYSTEM: Alert, oriented x3. No gross focal   neurological deficits. EXTREMITIES: No edema. Pulses 2+ bilaterally. MUSCULOSKELETAL: No obvious joint deformity or swelling. SKIN: No active skin lesions seen in the exposed parts of the body. PSYCHIATRY: Normal mood and affect. LYMPHATIC: No cervical lymphadenopathy. DIAGNOSTIC DATA: None. LABORATORY DATA: WBC 2.5, hemoglobin 11.4, hematocrit 33.4,   platelets 33.  Chemistry: Sodium 137, potassium 3.7, chloride 98, CO2   29, glucose 88, BUN 12, creatinine 1.09, calcium 8.8, bilirubin total 1.9,   ALT 52, AST 116, and phosphatase 122, total protein 7.6, albumin   level 3.6, globulin 4.0. Serum alcohol level less than 10. ASSESSMENT   1. Diarrhea. 2. Weight loss. 3. Pancytopenia. 4. Elevated blood pressure. 5. Hyperbilirubinemia. 6. Alcohol abuse. 7. Tobacco abuse. PLAN   1. Diarrhea. We will admit the patient for further evaluation and   treatment. Will carry out supportive therapy. Will also carry out stool   studies, which include a stool for ova and parasites, C. difficile, WBC,   culture and sensitivity. 2. Weight loss. The patient has had a significant weight loss in the past   year. He about 40 pounds weight loss. Will obtain a CT scan of the   chest, abdomen and pelvis for a mass lesion. Will check TSH level. 3. Pancytopenia. This may be related to alcohol. We will monitor the   patient closely. Hematology consult will be requested to assist in   evaluation and treatment of pancytopenia. 4. Elevated blood pressure. The patient has no history of hypertension. Will monitor the patient's blood pressure closely. If the average blood   pressure reading remains elevated, the patient may require   antihypertensive medication for new-onset hypertension. Will check   TSH level. We will obtain EKG. 5. Hyperbilirubinemia. Will await results of the CT scan of the abdomen   and pelvis. Will check an amylase and lipase level. 6. Alcohol abuse. The patient advised to cut back on alcohol   consumption. His CIWA score at present is 0, the patient will be placed   on for withdrawal potential. The patient will also placed on thiamine,   folic acid and multivitamin. 7. Tobacco abuse. The patient advised to quit smoking. Will place the   patient on Nicoderm patch. 8. The patient is a FULL CODE. We will request SCDs for DVT   prophylaxis.         MD Lauren Angel / Vianney Baker   D:  08/22/2017   21:05   T:  08/22/2017   22:54   Job #:  [id number]

## 2017-08-23 NOTE — PROGRESS NOTES
0935: Patient given 1 of 2 bottled of Omnipaque to drink for CT scan. Patient instructed to drink first bottle immediately. Second bottle to be drunk at 1035.  Will check on patient at that time to ensure patient is drinking the oral contrast.

## 2017-08-23 NOTE — ED NOTES
Report attempted, RN stated she thought she would be receiving the patient but she could not clock in until midnight.

## 2017-08-23 NOTE — ROUTINE PROCESS
Bedside and Verbal shift change report given to EMMANUEL Coffman (oncoming nurse) by Serge Sanchez RN (offgoing nurse). Report included the following information SBAR, Kardex, Intake/Output, MAR and Recent Results.

## 2017-08-23 NOTE — PROGRESS NOTES
Bedside and Verbal shift change report given to Haley Macias (oncoming nurse) by Michaela Leong (offgoing nurse). Report included the following information SBAR, Kardex, MAR and Recent Results.

## 2017-08-24 ENCOUNTER — APPOINTMENT (OUTPATIENT)
Dept: NUCLEAR MEDICINE | Age: 47
DRG: 872 | End: 2017-08-24
Attending: NURSE PRACTITIONER
Payer: COMMERCIAL

## 2017-08-24 ENCOUNTER — APPOINTMENT (OUTPATIENT)
Dept: CT IMAGING | Age: 47
DRG: 872 | End: 2017-08-24
Attending: NURSE PRACTITIONER
Payer: COMMERCIAL

## 2017-08-24 PROBLEM — A04.72 CLOSTRIDIUM DIFFICILE DIARRHEA: Status: ACTIVE | Noted: 2017-08-24

## 2017-08-24 PROBLEM — E83.42 HYPOMAGNESEMIA: Status: ACTIVE | Noted: 2017-08-24

## 2017-08-24 PROBLEM — R74.8 ELEVATED LIPASE: Status: ACTIVE | Noted: 2017-08-24

## 2017-08-24 PROBLEM — E83.39 HYPOPHOSPHATEMIA: Status: ACTIVE | Noted: 2017-08-24

## 2017-08-24 PROBLEM — R19.7 DIARRHEA: Status: RESOLVED | Noted: 2017-08-22 | Resolved: 2017-08-24

## 2017-08-24 PROBLEM — E87.6 HYPOKALEMIA: Status: ACTIVE | Noted: 2017-08-24

## 2017-08-24 PROBLEM — D61.818 PANCYTOPENIA (HCC): Status: ACTIVE | Noted: 2017-08-24

## 2017-08-24 PROBLEM — A41.9 SEPSIS (HCC): Status: ACTIVE | Noted: 2017-08-24

## 2017-08-24 LAB
ALBUMIN SERPL ELPH-MCNC: 3.6 G/DL (ref 2.9–4.4)
ALBUMIN SERPL-MCNC: 3.2 G/DL (ref 3.5–5)
ALBUMIN/GLOB SERPL: 0.9 {RATIO} (ref 1.1–2.2)
ALBUMIN/GLOB SERPL: 1.2 {RATIO} (ref 0.7–1.7)
ALP SERPL-CCNC: 105 U/L (ref 45–117)
ALPHA1 GLOB SERPL ELPH-MCNC: 0.3 G/DL (ref 0–0.4)
ALPHA2 GLOB SERPL ELPH-MCNC: 0.6 G/DL (ref 0.4–1)
ALT SERPL-CCNC: 45 U/L (ref 12–78)
ANION GAP SERPL CALC-SCNC: 9 MMOL/L (ref 5–15)
APTT PPP: 34.3 SEC (ref 22.1–32.5)
AST SERPL-CCNC: 97 U/L (ref 15–37)
B-GLOBULIN SERPL ELPH-MCNC: 0.9 G/DL (ref 0.7–1.3)
BASOPHILS # BLD: 0 K/UL (ref 0–0.1)
BASOPHILS NFR BLD: 0 % (ref 0–1)
BILIRUB DIRECT SERPL-MCNC: 0.7 MG/DL (ref 0–0.2)
BILIRUB SERPL-MCNC: 2 MG/DL (ref 0.2–1)
BUN SERPL-MCNC: 7 MG/DL (ref 6–20)
BUN/CREAT SERPL: 7 (ref 12–20)
CALCIUM SERPL-MCNC: 7.9 MG/DL (ref 8.5–10.1)
CEA SERPL-MCNC: <0.5 NG/ML
CHLORIDE SERPL-SCNC: 101 MMOL/L (ref 97–108)
CO2 SERPL-SCNC: 24 MMOL/L (ref 21–32)
CREAT SERPL-MCNC: 0.96 MG/DL (ref 0.7–1.3)
DIFFERENTIAL METHOD BLD: ABNORMAL
EOSINOPHIL # BLD: 0 K/UL (ref 0–0.4)
EOSINOPHIL NFR BLD: 1 % (ref 0–7)
ERYTHROCYTE [DISTWIDTH] IN BLOOD BY AUTOMATED COUNT: 11.9 % (ref 11.5–14.5)
FIBRINOGEN PPP-MCNC: 197 MG/DL (ref 200–475)
GAMMA GLOB SERPL ELPH-MCNC: 1.2 G/DL (ref 0.4–1.8)
GLOBULIN SER CALC-MCNC: 3 G/DL (ref 2.2–3.9)
GLOBULIN SER CALC-MCNC: 3.7 G/DL (ref 2–4)
GLUCOSE SERPL-MCNC: 60 MG/DL (ref 65–100)
HAPTOGLOB SERPL-MCNC: 99 MG/DL (ref 30–200)
HCT VFR BLD AUTO: 34.2 % (ref 36.6–50.3)
HGB BLD-MCNC: 11.8 G/DL (ref 12.1–17)
IGA SERPL-MCNC: 360 MG/DL (ref 70–400)
IGG SERPL-MCNC: 1130 MG/DL (ref 700–1600)
IGM SERPL-MCNC: 123 MG/DL (ref 40–230)
INR PPP: 1.4 (ref 0.9–1.1)
KAPPA LC FREE SER-MCNC: 33.6 MG/L (ref 3.3–19.4)
KAPPA LC FREE/LAMBDA FREE SER: 1 {RATIO} (ref 0.26–1.65)
LAMBDA LC FREE SERPL-MCNC: 33.5 MG/L (ref 5.7–26.3)
LDH SERPL L TO P-CCNC: 225 U/L (ref 85–241)
LIPASE SERPL-CCNC: 619 U/L (ref 73–393)
LYMPHOCYTES # BLD: 0.8 K/UL (ref 0.8–3.5)
LYMPHOCYTES NFR BLD: 26 % (ref 12–49)
M PROTEIN SERPL ELPH-MCNC: NORMAL G/DL
MAGNESIUM SERPL-MCNC: 1.5 MG/DL (ref 1.6–2.4)
MCH RBC QN AUTO: 35.4 PG (ref 26–34)
MCHC RBC AUTO-ENTMCNC: 34.5 G/DL (ref 30–36.5)
MCV RBC AUTO: 102.7 FL (ref 80–99)
MONOCYTES # BLD: 0.2 K/UL (ref 0–1)
MONOCYTES NFR BLD: 8 % (ref 5–13)
NEUTS SEG # BLD: 2 K/UL (ref 1.8–8)
NEUTS SEG NFR BLD: 65 % (ref 32–75)
PERIPHERAL SMEAR,PSM: NORMAL
PHOSPHATE SERPL-MCNC: 2.3 MG/DL (ref 2.6–4.7)
PLATELET # BLD AUTO: 53 K/UL (ref 150–400)
PLATELET COMMENTS,PCOM: ABNORMAL
POTASSIUM SERPL-SCNC: 3 MMOL/L (ref 3.5–5.1)
PROT SERPL-MCNC: 6.6 G/DL (ref 6–8.5)
PROT SERPL-MCNC: 6.9 G/DL (ref 6.4–8.2)
PROTHROMBIN TIME: 14 SEC (ref 9–11.1)
RBC # BLD AUTO: 3.33 M/UL (ref 4.1–5.7)
RBC MORPH BLD: ABNORMAL
RETICS/RBC NFR AUTO: 1.2 % (ref 0.7–2.1)
SODIUM SERPL-SCNC: 134 MMOL/L (ref 136–145)
THERAPEUTIC RANGE,PTTT: ABNORMAL SECS (ref 58–77)
WBC # BLD AUTO: 3 K/UL (ref 4.1–11.1)
WBC #/AREA STL HPF: NORMAL /HPF (ref 0–4)

## 2017-08-24 PROCEDURE — 88185 FLOWCYTOMETRY/TC ADD-ON: CPT | Performed by: INTERNAL MEDICINE

## 2017-08-24 PROCEDURE — 77012 CT SCAN FOR NEEDLE BIOPSY: CPT

## 2017-08-24 PROCEDURE — 80076 HEPATIC FUNCTION PANEL: CPT | Performed by: NURSE PRACTITIONER

## 2017-08-24 PROCEDURE — 82784 ASSAY IGA/IGD/IGG/IGM EACH: CPT | Performed by: NURSE PRACTITIONER

## 2017-08-24 PROCEDURE — 83735 ASSAY OF MAGNESIUM: CPT | Performed by: NURSE PRACTITIONER

## 2017-08-24 PROCEDURE — 86301 IMMUNOASSAY TUMOR CA 19-9: CPT | Performed by: NURSE PRACTITIONER

## 2017-08-24 PROCEDURE — 80074 ACUTE HEPATITIS PANEL: CPT | Performed by: NURSE PRACTITIONER

## 2017-08-24 PROCEDURE — 85384 FIBRINOGEN ACTIVITY: CPT | Performed by: NURSE PRACTITIONER

## 2017-08-24 PROCEDURE — 88237 TISSUE CULTURE BONE MARROW: CPT | Performed by: INTERNAL MEDICINE

## 2017-08-24 PROCEDURE — 74011000250 HC RX REV CODE- 250: Performed by: NURSE PRACTITIONER

## 2017-08-24 PROCEDURE — 85730 THROMBOPLASTIN TIME PARTIAL: CPT | Performed by: NURSE PRACTITIONER

## 2017-08-24 PROCEDURE — 74011250636 HC RX REV CODE- 250/636: Performed by: NURSE PRACTITIONER

## 2017-08-24 PROCEDURE — 88184 FLOWCYTOMETRY/ TC 1 MARKER: CPT | Performed by: INTERNAL MEDICINE

## 2017-08-24 PROCEDURE — 82378 CARCINOEMBRYONIC ANTIGEN: CPT | Performed by: NURSE PRACTITIONER

## 2017-08-24 PROCEDURE — 88305 TISSUE EXAM BY PATHOLOGIST: CPT | Performed by: INTERNAL MEDICINE

## 2017-08-24 PROCEDURE — 88311 DECALCIFY TISSUE: CPT | Performed by: INTERNAL MEDICINE

## 2017-08-24 PROCEDURE — 74011000250 HC RX REV CODE- 250: Performed by: INTERNAL MEDICINE

## 2017-08-24 PROCEDURE — 85610 PROTHROMBIN TIME: CPT | Performed by: NURSE PRACTITIONER

## 2017-08-24 PROCEDURE — A9503 TC99M MEDRONATE: HCPCS

## 2017-08-24 PROCEDURE — 36415 COLL VENOUS BLD VENIPUNCTURE: CPT | Performed by: NURSE PRACTITIONER

## 2017-08-24 PROCEDURE — 74011250636 HC RX REV CODE- 250/636: Performed by: INTERNAL MEDICINE

## 2017-08-24 PROCEDURE — 88374 M/PHMTRC ALYS ISHQUANT/SEMIQ: CPT | Performed by: INTERNAL MEDICINE

## 2017-08-24 PROCEDURE — 83690 ASSAY OF LIPASE: CPT | Performed by: NURSE PRACTITIONER

## 2017-08-24 PROCEDURE — 84100 ASSAY OF PHOSPHORUS: CPT | Performed by: NURSE PRACTITIONER

## 2017-08-24 PROCEDURE — 88264 CHROMOSOME ANALYSIS 20-25: CPT | Performed by: INTERNAL MEDICINE

## 2017-08-24 PROCEDURE — 88313 SPECIAL STAINS GROUP 2: CPT | Performed by: INTERNAL MEDICINE

## 2017-08-24 PROCEDURE — 86335 IMMUNFIX E-PHORSIS/URINE/CSF: CPT | Performed by: NURSE PRACTITIONER

## 2017-08-24 PROCEDURE — 80048 BASIC METABOLIC PNL TOTAL CA: CPT | Performed by: NURSE PRACTITIONER

## 2017-08-24 PROCEDURE — 85097 BONE MARROW INTERPRETATION: CPT | Performed by: INTERNAL MEDICINE

## 2017-08-24 PROCEDURE — 88342 IMHCHEM/IMCYTCHM 1ST ANTB: CPT | Performed by: INTERNAL MEDICINE

## 2017-08-24 PROCEDURE — 83615 LACTATE (LD) (LDH) ENZYME: CPT | Performed by: NURSE PRACTITIONER

## 2017-08-24 PROCEDURE — 74011250637 HC RX REV CODE- 250/637: Performed by: NURSE PRACTITIONER

## 2017-08-24 PROCEDURE — 74011250636 HC RX REV CODE- 250/636: Performed by: RADIOLOGY

## 2017-08-24 PROCEDURE — 07DR3ZX EXTRACTION OF ILIAC BONE MARROW, PERCUTANEOUS APPROACH, DIAGNOSTIC: ICD-10-PCS | Performed by: RADIOLOGY

## 2017-08-24 PROCEDURE — 85045 AUTOMATED RETICULOCYTE COUNT: CPT | Performed by: NURSE PRACTITIONER

## 2017-08-24 PROCEDURE — 88365 INSITU HYBRIDIZATION (FISH): CPT | Performed by: INTERNAL MEDICINE

## 2017-08-24 PROCEDURE — 77030028872 HC BN BIOP NDL ON CNTRL TY TELE -C

## 2017-08-24 PROCEDURE — 85025 COMPLETE CBC W/AUTO DIFF WBC: CPT | Performed by: NURSE PRACTITIONER

## 2017-08-24 PROCEDURE — 83010 ASSAY OF HAPTOGLOBIN QUANT: CPT | Performed by: NURSE PRACTITIONER

## 2017-08-24 PROCEDURE — 65270000029 HC RM PRIVATE

## 2017-08-24 RX ORDER — POTASSIUM CHLORIDE AND SODIUM CHLORIDE 900; 300 MG/100ML; MG/100ML
INJECTION, SOLUTION INTRAVENOUS CONTINUOUS
Status: DISCONTINUED | OUTPATIENT
Start: 2017-08-24 | End: 2017-08-25 | Stop reason: HOSPADM

## 2017-08-24 RX ORDER — SODIUM CHLORIDE 9 MG/ML
25 INJECTION, SOLUTION INTRAVENOUS CONTINUOUS
Status: DISCONTINUED | OUTPATIENT
Start: 2017-08-24 | End: 2017-08-24

## 2017-08-24 RX ORDER — POTASSIUM CHLORIDE 750 MG/1
40 TABLET, FILM COATED, EXTENDED RELEASE ORAL EVERY 4 HOURS
Status: DISPENSED | OUTPATIENT
Start: 2017-08-24 | End: 2017-08-24

## 2017-08-24 RX ORDER — FENTANYL CITRATE 50 UG/ML
200 INJECTION, SOLUTION INTRAMUSCULAR; INTRAVENOUS
Status: DISCONTINUED | OUTPATIENT
Start: 2017-08-24 | End: 2017-08-24

## 2017-08-24 RX ORDER — METRONIDAZOLE 250 MG/1
500 TABLET ORAL 3 TIMES DAILY
Status: DISCONTINUED | OUTPATIENT
Start: 2017-08-24 | End: 2017-08-25 | Stop reason: HOSPADM

## 2017-08-24 RX ORDER — LIDOCAINE HYDROCHLORIDE 10 MG/ML
10 INJECTION INFILTRATION; PERINEURAL
Status: COMPLETED | OUTPATIENT
Start: 2017-08-24 | End: 2017-08-24

## 2017-08-24 RX ORDER — TRAMADOL HYDROCHLORIDE 50 MG/1
50 TABLET ORAL
Status: DISCONTINUED | OUTPATIENT
Start: 2017-08-24 | End: 2017-08-24

## 2017-08-24 RX ORDER — METRONIDAZOLE 500 MG/100ML
500 INJECTION, SOLUTION INTRAVENOUS EVERY 8 HOURS
Status: DISCONTINUED | OUTPATIENT
Start: 2017-08-24 | End: 2017-08-24

## 2017-08-24 RX ORDER — POTASSIUM CHLORIDE 750 MG/1
40 TABLET, FILM COATED, EXTENDED RELEASE ORAL
Status: DISCONTINUED | OUTPATIENT
Start: 2017-08-24 | End: 2017-08-24

## 2017-08-24 RX ADMIN — SODIUM CHLORIDE 25 ML/HR: 900 INJECTION, SOLUTION INTRAVENOUS at 14:00

## 2017-08-24 RX ADMIN — METRONIDAZOLE 500 MG: 250 TABLET ORAL at 21:00

## 2017-08-24 RX ADMIN — SODIUM CHLORIDE AND POTASSIUM CHLORIDE: 9; 2.98 INJECTION, SOLUTION INTRAVENOUS at 15:11

## 2017-08-24 RX ADMIN — FENTANYL CITRATE 50 MCG: 50 INJECTION, SOLUTION INTRAMUSCULAR; INTRAVENOUS at 13:57

## 2017-08-24 RX ADMIN — LIDOCAINE HYDROCHLORIDE 10 ML: 10 INJECTION, SOLUTION INFILTRATION; PERINEURAL at 13:52

## 2017-08-24 RX ADMIN — METRONIDAZOLE 500 MG: 250 TABLET ORAL at 10:31

## 2017-08-24 RX ADMIN — LORAZEPAM 1 MG: 2 INJECTION INTRAMUSCULAR; INTRAVENOUS at 13:31

## 2017-08-24 RX ADMIN — SODIUM CHLORIDE: 900 INJECTION, SOLUTION INTRAVENOUS at 15:16

## 2017-08-24 RX ADMIN — POTASSIUM CHLORIDE 40 MEQ: 750 TABLET, FILM COATED, EXTENDED RELEASE ORAL at 10:32

## 2017-08-24 RX ADMIN — FENTANYL CITRATE 100 MCG: 50 INJECTION, SOLUTION INTRAMUSCULAR; INTRAVENOUS at 14:00

## 2017-08-24 RX ADMIN — FOLIC ACID: 5 INJECTION, SOLUTION INTRAMUSCULAR; INTRAVENOUS; SUBCUTANEOUS at 01:00

## 2017-08-24 RX ADMIN — METRONIDAZOLE 500 MG: 250 TABLET ORAL at 17:28

## 2017-08-24 NOTE — PROGRESS NOTES
Bedside shift change report given to [facility] EDGAR (oncoming nurse) by Gerson Cronin (offgoing nurse). Report included the following information SBAR.

## 2017-08-24 NOTE — PROGRESS NOTES
Hospitalist Progress Note  Devon Pardo NP  Office: [phone]  Cell: [phone] 7a-5p      Date of Service:  2017  NAME:  Vivian Moreno  :  1970  MRN:  [id number]      Admission Summary:   47-yom without any significant pmh, who was in his usual state of health until about 2 weeks ago when the pt developed diarrhea, which is progressive, about 3-4 loose stools per day. This is associated with decreased appetite as well as a dry mouth. Pt has not been able to tolerate food as a result of the diarrhea. The diarrhea is not associated with abdominal pain. He has lost about 40 pounds within the past year. The weight loss was unintentional. It was reported by the sister that the patient has history of alcohol abuse. Pt denies that. His last alcohol consumption was 5 days ago. Pt also has significant weakness and fatigue. He was sent home from work today because of the increased weakness and fatigue. Pt denies fever, rigors, and chills. The diarrhea is not associated with nausea and vomiting. No recent history of travel, no sick contacts, the patient did not eat anything unusual.     Interval history / Subjective:     Patient reports two loose BMs today. Continue to reports fatigue, generalized weakness. Denies any nausea, vomiting, abdominal pain. Discussed CT findings and concern for multiple myeloma with patient and family in room. Discussed that oncology would be by to see patient shortly. Offered  to patient if he needs someone to talk to.       Assessment & Plan:     Diarrhea  -etiology unknown, possibly infectious bacterial vs viral vs s/e of alcohol withdrawal  -stool studies pending  -hold on abx for now  -IVF    Sepsis likely from diarrhea, possible viral vs bacterial  -fevers, pancytopenia on admission   -check blood cultures  -CT abd/pelvis/chest showing pelvic lytic lesions, no acute process  -IVF   -monitor labs, send UA    Wt loss   -related to the above vs h/o ETOH abuse vs possible multiple myeloma  -nutrition consult    Pancytopenia  -related to infectious process vs likely multiple myeloma  -hematology following, discussed with [facility] NP, will likely need bone marrow biopsy   -CT abd/pelvis Lytic bone lesions of the pelvis, suspicious for possible multiple myeloma.  -send multiple myeloma labs  -check bone scan    Elevated Blood Pressure  -elevated on admission, now wnl  -monitor    Elevated Lipase  -possible pancreatitis given h/o ETOH use however denies any abdominal pain, n/v  -IVF, repeat labs in am    ETOH/Tobacco abuse  -reports last drink was last Friday  -Banana bag   -ativan prn   -nicotine patch prn   -cessation education provided    Code status: Full  DVT prophylaxis: SCDs    Care Plan discussed with: Patient/Family and Nurse Dr Whyte Seen  Disposition: Home w/Family and TBD     Hospital Problems  Date Reviewed: 8/22/2017          Codes Class Noted POA    * (Principal)Diarrhea ICD-10-CM: R19.7  ICD-9-CM: 787.91  8/22/2017 Yes                Review of Systems:   A comprehensive review of systems was negative except for that written in the HPI. Vital Signs:    Last 24hrs VS reviewed since prior progress note. Most recent are:  Visit Vitals    /88 (BP 1 Location: Left arm, BP Patient Position: At rest;Trendelenburg)    Pulse 64    Temp 98.8 °F (37.1 °C)    Resp 14    Ht 5' 6.5\" (1.689 m)    Wt 54 kg (119 lb 2 oz)    SpO2 98%    BMI 18.94 kg/m2       No intake or output data in the 24 hours ending 08/24/17 0959     Physical Examination:             Constitutional:  No acute distress, cooperative, pleasant, thin ill appearing male   ENT:  Oral mucous moist, oropharynx benign. Neck supple,    Resp:  CTA bilaterally. No wheezing/rhonchi/rales. No accessory muscle use   CV:  Regular rhythm, normal rate, no murmurs, gallops, rubs    GI:  Soft, non distended, non tender.  normoactive bowel sounds, no hepatosplenomegaly     Musculoskeletal:  No edema, warm, 2+ pulses throughout    Neurologic:  Moves all extremities. AAOx3     Psych:  Good insight, Not anxious nor agitated. Data Review:    Review and/or order of clinical lab test  Review and/or order of tests in the radiology section of CPT  Review and/or order of tests in the medicine section of [facility]      Labs:     Recent Labs      08/23/17 0419 08/22/17   1735   WBC  2.5*  2.5*   HGB  10.5*  11.4*   HCT  30.3*  33.4*   PLT  28*  33*     Recent Labs      08/24/17   0555  08/23/17 0419 08/22/17   1735   NA  134*  138  137   K  3.0*  3.4*  3.7   CL  101  102  98   CO2  24  28  29   BUN  7  12  12   CREA  0.96  0.96  1.09   GLU  60*  69  88   CA  7.9*  8.0*  8.8   MG  1.5*  1.3*   --    PHOS  2.3*  2.2*   --      Recent Labs      08/24/17 0555 08/23/17 0419 08/22/17   1735   SGOT  97*  102*  116*   ALT  45  44  52   AP  105  105  122*   TBILI  2.0*  1.6*  1.9*   TP  6.9  6.2*  7.6   ALB  3.2*  2.9*  3.6   GLOB  3.7  3.3  4.0   AML   --   126*   --    LPSE  619*  621*   --      Recent Labs      08/24/17 0555   INR  1.4*   PTP  14.0*   APTT  34.3*      Recent Labs      08/23/17 0419   TIBC  175*   PSAT  30   FERR  1531*      Lab Results   Component Value Date/Time    Folate 24.7 08/23/2017 04:19 AM      No results for input(s): PH, PCO2, PO2 in the last 72 hours. No results for input(s): CPK, CKNDX, TROIQ in the last 72 hours.     No lab exists for component: CPKMB  No results found for: CHOL, CHOLX, CHLST, CHOLV, HDL, LDL, LDLC, DLDLP, TGLX, TRIGL, TRIGP, CHHD, CHHDX  No results found for: [facility]  Lab Results   Component Value Date/Time    Color YELLOW/STRAW 08/23/2017 02:47 PM    Appearance CLEAR 08/23/2017 02:47 PM    Specific gravity 1.010 08/23/2017 02:47 PM    pH (UA) 6.0 08/23/2017 02:47 PM    Protein NEGATIVE  08/23/2017 02:47 PM    Glucose NEGATIVE  08/23/2017 02:47 PM    Ketone TRACE 08/23/2017 02:47 PM    Bilirubin NEGATIVE 08/23/2017 02:47 PM    Urobilinogen 0.2 08/23/2017 02:47 PM    Nitrites NEGATIVE  08/23/2017 02:47 PM    Leukocyte Esterase NEGATIVE  08/23/2017 02:47 PM    Epithelial cells FEW 08/23/2017 02:47 PM    Bacteria NEGATIVE  08/23/2017 02:47 PM    WBC 0-4 08/23/2017 02:47 PM    RBC 0-5 08/23/2017 02:47 PM         Medications Reviewed:     Current Facility-Administered Medications   Medication Dose Route Frequency    0.9% sodium chloride with KCl 40 mEq/L infusion   IntraVENous CONTINUOUS    potassium phosphate 15 mmol in 0.9% sodium chloride 250 mL infusion   IntraVENous ONCE    metroNIDAZOLE (FLAGYL) tablet 500 mg  500 mg Oral TID    potassium chloride SR (KLOR-CON 10) tablet 40 mEq  40 mEq Oral Q4H    0.9% sodium chloride 5,696 mL with folic acid 1 mg, thiamine 100 mg, mvi, adult no. 4 with vit K 10 mL infusion   IntraVENous Q24H    acetaminophen (TYLENOL) tablet 650 mg  650 mg Oral Q4H PRN    ondansetron (ZOFRAN) injection 4 mg  4 mg IntraVENous Q4H PRN    LORazepam (ATIVAN) injection 1 mg  1 mg IntraVENous Q4H PRN    nicotine (NICODERM CQ) 21 mg/24 hr patch 1 Patch  1 Patch TransDERmal DAILY PRN     ______________________________________________________________________  EXPECTED LENGTH OF STAY: 2d 16h  ACTUAL LENGTH OF STAY:          Elda Reno 71, NP

## 2017-08-24 NOTE — PROGRESS NOTES
Hematology/Oncology Progress Note    REASON FOR VISIT: Pancytopenia    HISTORY OF PRESENT ILLNESS: Mr. Madan Amos is a 56 y.o. male who we are asked to see in consultation for pancytopenia. Patient presented to the Emergency Room with diarrhea x 2 weeks. He denies any blood or mucous in his stool. He states that he has been feeling more fatigued recently. This has interfered with his work. States that his appetite has been decreased and he has lost 30 pounds in the last month or so. He has not been trying to lose weight. He has not been experiencing any nausea, vomiting, or abdominal pain. He denies any headaches, dizziness, or vision changes. He denies shortness of breath or chest pain. Denies hematuria or changes in voiding patterns. He denies recent travel out of the country, recent surgery/trauma, recent antibiotic use, or bleeding. Denies any recent blood product transfusions or exposure to heparin. No fevers. He states that he does not routinely follow with Primary Care. Describes himself as overall very healthy until a few weeks ago. No personal history of bleeding disorders or cancer. Denies family history of bleeding or clotting disorders. His father and both grandparents with cancer. States his father had prostate cancer but he is unsure of type of malignancy his grandfather's had. He smokes cigarettes socially per his report. States he has only smoked for about 2 years. Drinks alcohol. States that he drinks a few shots of liquor and a few beers every other day. Note history of heavy alcohol use per family reports. Denies illicit drug use. INTERVAL HISTORY: He is resting in bed with sister, Noelle Wen, present. Slept well overnight. States that his diarrhea has significantly improved. Plan today for bone marrow biopsy. History reviewed. No pertinent past medical history. History reviewed. No pertinent surgical history.     Allergies   Allergen Reactions    Aspirin Rash       Current Facility-Administered Medications   Medication Dose Route Frequency Provider Last Rate Last Dose    0.9% sodium chloride with KCl 40 mEq/L infusion   IntraVENous CONTINUOUS Vilma Mcleod NP        potassium phosphate 15 mmol in 0.9% sodium chloride 250 mL infusion   IntraVENous ONCE Vilma Mcleod NP        metroNIDAZOLE (FLAGYL) tablet 500 mg  500 mg Oral TID Vilma Mcleod, NP   500 mg at 08/24/17 1031    potassium chloride SR (KLOR-CON 10) tablet 40 mEq  40 mEq Oral Q4H Vilma Mcleod NP   40 mEq at 08/24/17 1032    0.9% sodium chloride 9,269 mL with folic acid 1 mg, thiamine 100 mg, mvi, adult no. 4 with vit K 10 mL infusion   IntraVENous Q24H Mich Del Rosario MD        acetaminophen (TYLENOL) tablet 650 mg  650 mg Oral Q4H PRN Mich Del Rosario MD        ondansetron (ZOFRAN) injection 4 mg  4 mg IntraVENous Q4H PRN Mich Del Rosario MD        LORazepam (ATIVAN) injection 1 mg  1 mg IntraVENous Q4H PRN Mich Del Rosario MD        nicotine (NICODERM CQ) 21 mg/24 hr patch 1 Patch  1 Patch TransDERmal DAILY PRN Vilma Mcleod NP           Social History     Social History    Marital status:      Spouse name: N/A    Number of children: N/A    Years of education: N/A     Social History Main Topics    Smoking status: Current Every Day Smoker     Packs/day: 0.10    Smokeless tobacco: Never Used    Alcohol use Yes      Comment: every other day    Drug use: No    Sexual activity: Not Asked     Other Topics Concern    None     Social History Narrative       History reviewed. No pertinent family history. ROS  As per the HPI, otherwise a comprehensive ROS is negative. ECOG PS is 1. Emotional well being addressed and patient is coping well.     Physical Examination:   Visit Vitals    /88 (BP 1 Location: Left arm, BP Patient Position: At rest;Trendelenburg)    Pulse 64    Temp 98.8 °F (37.1 °C)    Resp 14    Ht 5' 6.5\" (1.689 m)    Wt 119 lb 2 oz (54 kg)    SpO2 98%    BMI 18.94 kg/m2     General appearance - alert, pleasant, conversant, no distress  Mental status - oriented to person, place, and time  Mouth - mucous membranes moist, pharynx normal without lesions  Neck - supple, no significant adenopathy  Lymphatics - no palpable lymphadenopathy, no hepatosplenomegaly  Chest - clear to auscultation, no wheezes, rales or rhonchi, symmetric air entry  Heart - normal rate, regular rhythm, normal S1, S2, no murmurs, rubs, clicks or gallops  Abdomen - soft, nontender, nondistended, no masses or organomegaly, bowel sounds present  Neurological - normal speech, no focal findings or movement disorder noted  Extremities - peripheral pulses normal, no pedal edema  Skin - warm, dry, intact    LABS  Lab Results   Component Value Date/Time    WBC 3.0 08/24/2017 05:55 AM    HGB 11.8 08/24/2017 05:55 AM    HCT 34.2 08/24/2017 05:55 AM    PLATELET 53 69/95/0001 05:55 AM    .7 08/24/2017 05:55 AM    ABS. NEUTROPHILS 2.0 08/24/2017 05:55 AM     Lab Results   Component Value Date/Time    Sodium 134 08/24/2017 05:55 AM    Potassium 3.0 08/24/2017 05:55 AM    Chloride 101 08/24/2017 05:55 AM    CO2 24 08/24/2017 05:55 AM    Glucose 60 08/24/2017 05:55 AM    BUN 7 08/24/2017 05:55 AM    Creatinine 0.96 08/24/2017 05:55 AM    GFR est AA >60 08/24/2017 05:55 AM    GFR est non-AA >60 08/24/2017 05:55 AM    Calcium 7.9 08/24/2017 05:55 AM     Lab Results   Component Value Date/Time    AST (SGOT) 97 08/24/2017 05:55 AM    Alk.  phosphatase 105 08/24/2017 05:55 AM    Protein, total 6.9 08/24/2017 05:55 AM    Albumin 3.2 08/24/2017 05:55 AM    Globulin 3.7 08/24/2017 05:55 AM    A-G Ratio 0.9 08/24/2017 05:55 AM     Lab Results   Component Value Date/Time    Reticulocyte count 1.2 08/24/2017 05:55 AM    Iron % saturation 30 08/23/2017 04:19 AM    TIBC 175 08/23/2017 04:19 AM    Ferritin 1531 08/23/2017 04:19 AM    Vitamin B12 491 08/23/2017 04:19 AM    Folate 24.7 08/23/2017 04:19 AM    Haptoglobin 99 08/24/2017 05:55 AM     08/24/2017 05:55 AM    TSH 2.31 08/23/2017 04:19 AM    Lipase 619 08/24/2017 05:55 AM     Lab Results   Component Value Date/Time    INR 1.4 08/24/2017 05:55 AM    aPTT 34.3 08/24/2017 05:55 AM    Fibrinogen 197 08/24/2017 05:55 AM     Smear:  Leukocytopenia without dysmyelopoiesis. Occasional reactive   lymphocytes. Thrombocytopenia without platelet aggregates. RBCs   demonstrate mild anisocytosis. IMAGING  CT C/A/P 8/23/17  IMPRESSION:  1. Abdominal and pelvic ascites. 2. Lytic bone lesions of the pelvis, suspicious for possible multiple myeloma. ASSESSMENT  Mr. Eileen Azar is a 56 y.o. male with history of alcohol abuse who presented with diarrhea, fatigue, and weight loss and was found to have  new pancytopenia and lytic lesions on CT for which we are consulted. He also has a ascites of undetermined etiology    DISCUSSION/PLAN  1. Pancytopenia, acute. Preceded by a nonbloody diarrheal illness. Reviewed labs obtained by the primary team which have ruled out iron deficiency and B12 deficiency. No evidence of hemolysis. Counts improving. BM biopsy done and pending  CEA normal. Ca 19-9 pending. Gammopathy evaluation still pending. 2. Lytic Lesions. Noted on CT C/A/P. Myeloma versus other metastatic malignancy - work up pending. 3. Diarrhea. Improving. He is positive for C. Dif and on flagyl. Appreciate Hospitalist management. 4. Hyperbilirubinemia, abdominal pancreatic enzymes and ascites. CT reviewed personally and notable for normal liver and spleen. He pancreas is also unremarkable. GI consult is pending. Appreciate consultation and care of Mr. Eileen Azar. We will follow along with you. Please call with any questions. Seen in conjunction with Ramirez Lazaro NP.   If he is discharged over the weekend we will see him in clinic in 1-2 weeks to go over the results    Signed by: Tomasz Trammell MD                     August 24, 2017

## 2017-08-24 NOTE — PROGRESS NOTES
TRANSFER - OUT REPORT:    Verbal report given to Suresh(name) on Hi Alcala  being transferred to (unit) for routine progression of care       Report consisted of patients Situation, Background, Assessment and   Recommendations(SBAR). Information from the following report(s) Kardex and Procedure Summary was reviewed with the receiving nurse. Lines:   Peripheral IV 08/22/17 Right Antecubital (Active)   Site Assessment Clean, dry, & intact 8/23/2017 12:00 PM   Phlebitis Assessment 0 8/23/2017 12:00 PM   Infiltration Assessment 0 8/23/2017 12:00 PM   Dressing Status Clean, dry, & intact 8/23/2017 12:00 PM   Dressing Type Transparent;Tape 8/23/2017 12:00 PM   Hub Color/Line Status Pink; Infusing 8/23/2017 12:00 PM   Action Taken Open ports on tubing capped 8/23/2017 12:00 PM   Alcohol Cap Used Yes 8/23/2017 12:00 PM        Opportunity for questions and clarification was provided.       Patient transported with:   [employer]

## 2017-08-24 NOTE — PROGRESS NOTES
Care Management Interventions  MyChart Signup: No  Discharge Durable Medical Equipment: No  Physical Therapy Consult: No  Occupational Therapy Consult: No  Speech Therapy Consult: No  Current Support Network: Other, Own Home  Confirm Follow Up Transport: Family  Plan discussed with Pt/Family/Caregiver: Yes  Freedom of Choice Offered: Yes  Discharge Location  Discharge Placement: Home    Chart reviewed for transitions of care, discussed patient during rounds. Patient was admitted for diarrhea and weight loss, has been diagnosed with pancytopenia, pancreatitis and now CDiff. Patient is being worked up for other issues as well. Jose A met with patient and his sister (who is an RN) in the room to explain role and offer support. Patient is alert and oriented x4, informed me that his wife is causing him a lot of problems right now and he is depending on his sister for support. Patient voiced interest in receiving assistance with getting sober. Cm asked him what methods he had tried before, and he stated he had not tried anything, had only made one call to the local [place] [address] office. Patients' sister inquired about an inpatient treatment facility. Cm informed them both that I would provide the patient with resources on inpatient treatment centers as well as outpatient and local [place] [address] meetings. Will follow.   Advance Gin , [place]

## 2017-08-24 NOTE — CONSULTS
[address] NOTE  [facility]  [phone] NP in-hospital cell phone M-F until 4:30  After 5pm or on weekends, please call  for physician on call        NAME:  Юлия Hendrix   :   1970   MRN:   [id number]       Referring Physician: Rossford Pool    Consult Date: 2017 9:18 AM    Chief Complaint: abnormal pancreatic enzymes and ascites     History of Present Illness:  Patient is a 56 y.o. who is seen in consultation at the request of Dr. Juanis Levine for abnormal pancreatic enzymes and ascites. Patient reports a two week history of diarrhea with associated anorexia, unintentional weight loss (about 30 pounds over 4 months), and weakness. Patient reports drinking a few beers and a few shots daily. Patient denies abdominal pain or nausea. Patient and sister report noticing scleral icterus Monday, which is mostly resolved. Upon admission was found to have mildly elevated lipase in the 600s , mild elevation in AST 97, elevated bilirubin 2.0, and abdominal/pelvis ascites. There were no hepatic or pancreatic abnormalities on CT. Patient was also found to have pancytopenia and lytic lesions and is undergoing oncology workup. I have reviewed the emergency room note, hospital admission note, notes by all other clinicians who have seen the patient during this hospitalization to date. I have reviewed the problem list and the reason for this hospitalization. I have reviewed the allergies and the medications the patient was taking at home prior to this hospitalization. PMH:  History reviewed. No pertinent past medical history. PSH:  History reviewed. No pertinent surgical history. Allergies: Allergies   Allergen Reactions    Aspirin Rash       Home Medications:  Prior to Admission Medications   Prescriptions Last Dose Informant Patient Reported? Taking?    therapeutic multivitamin (THERA) tablet   Yes Yes   Sig: Take 1 Tab by mouth daily. Facility-Administered Medications: None       Hospital Medications:  Current Facility-Administered Medications   Medication Dose Route Frequency    0.9% sodium chloride with KCl 40 mEq/L infusion   IntraVENous CONTINUOUS    potassium phosphate 15 mmol in 0.9% sodium chloride 250 mL infusion   IntraVENous ONCE    metroNIDAZOLE (FLAGYL) tablet 500 mg  500 mg Oral TID    potassium chloride SR (KLOR-CON 10) tablet 40 mEq  40 mEq Oral Q4H    0.9% sodium chloride 3,317 mL with folic acid 1 mg, thiamine 100 mg, mvi, adult no. 4 with vit K 10 mL infusion   IntraVENous Q24H    acetaminophen (TYLENOL) tablet 650 mg  650 mg Oral Q4H PRN    ondansetron (ZOFRAN) injection 4 mg  4 mg IntraVENous Q4H PRN    LORazepam (ATIVAN) injection 1 mg  1 mg IntraVENous Q4H PRN    nicotine (NICODERM CQ) 21 mg/24 hr patch 1 Patch  1 Patch TransDERmal DAILY PRN       Social History:  Social History   Substance Use Topics    Smoking status: Current Every Day Smoker     Packs/day: 0.10    Smokeless tobacco: Never Used    Alcohol use Yes      Comment: every other day       Family History:  History reviewed. No pertinent family history.     Review of Systems:  Constitutional: negative fever, negative chills, negative weight loss  Eyes:   negative visual changes  ENT:   negative sore throat, tongue or lip swelling  Respiratory:  negative cough, negative dyspnea  Cards:  negative for chest pain, palpitations, lower extremity edema  GI:   See HPI  :  negative for frequency, dysuria  Integument:  negative for rash and pruritus  Heme:  negative for easy bruising and gum/nose bleeding  Musculoskeletal:negative for myalgias, back pain and muscle weakness  Neuro:    negative for headaches, dizziness, vertigo  Psych: negative for feelings of anxiety, depression     Objective:   Patient Vitals for the past 8 hrs:   BP Temp Pulse Resp SpO2   08/24/17 0828 134/88 98.8 °F (37.1 °C) 64 14 98 %   08/24/17 0344 105/83 99 °F (37.2 °C) 88 16 98 %        08/22 1901 - 08/24 0700  In: 997.9 [I.V.:997.9]  Out: -     EXAM:     CONST:  Pleasant male lying in bed, no acute distress   NEURO:  alert and oriented x 3   HEENT: EOMI, muddy sclera    LUNGS: clear to ausculation, (-) wheeze   CARD:  regular rate and rhythm, S1 S2   ABD:  taught, no tenderness, no rebound, bowel sounds (+) all 4 quadrants, - fluid wave, non distended   EXT:  no edema, warm; no asterixis    PSYCH: full, not anxious     Data Review     Recent Labs      08/23/17   0419  08/22/17   1735   WBC  2.5*  2.5*   HGB  10.5*  11.4*   HCT  30.3*  33.4*   PLT  28*  33*     Recent Labs      08/24/17   0555  08/23/17   0419   NA  134*  138   K  3.0*  3.4*   CL  101  102   CO2  24  28   BUN  7  12   CREA  0.96  0.96   GLU  60*  69   PHOS  2.3*  2.2*   CA  7.9*  8.0*     Recent Labs      08/24/17   0555  08/23/17   0419   SGOT  97*  102*   AP  105  105   TP  6.9  6.2*   ALB  3.2*  2.9*   GLOB  3.7  3.3   AML   --   126*   LPSE  619*  621*     Recent Labs      08/24/17   0555   INR  1.4*   PTP  14.0*   APTT  34.3*          Assessment:   · Diarrhea: C. Diff +  · Elevated AST: heavy alcohol history  · Elevated Lipase: not within range of pancreatitis  · Ascites: ideally would get diagnostic tap       Patient Active Problem List   Diagnosis Code    Diarrhea R19.7     Plan:     · Try for diagnostic paracentesis   · Agree with Flagyl  · Monitor CMP and lipase  · Thank you for allowing me to participate in care of Johanna Matamoros       Signed By: Gume Hirsch NP     8/24/2017  9:18 AM       Patient seen and examined, agree with plans, he is being treated for c. Diff. I am not sure as to the etiology of the ascites, but suspect his major issue is hematologic. I would hold off on tap until we know the results of bone marrow and platelets rise a little further.     Tiny Amin MD

## 2017-08-24 NOTE — PROGRESS NOTES
Hospitalist Progress Note  Devon Pardo NP  Office: [phone]  Cell: [phone] 7a-5p      Date of Service:  2017  NAME:  Vivian Moreno  :  1970  MRN:  [id number]      Admission Summary:   47-yom without any significant pmh, who was in his usual state of health until about 2 weeks ago when the pt developed diarrhea, which is progressive, about 3-4 loose stools per day. This is associated with decreased appetite as well as a dry mouth. Pt has not been able to tolerate food as a result of the diarrhea. The diarrhea is not associated with abdominal pain. He has lost about 40 pounds within the past year. The weight loss was unintentional. It was reported by the sister that the patient has history of alcohol abuse. Pt denies that. His last alcohol consumption was 5 days ago. Pt also has significant weakness and fatigue. He was sent home from work today because of the increased weakness and fatigue. Pt denies fever, rigors, and chills. The diarrhea is not associated with nausea and vomiting. No recent history of travel, no sick contacts, the patient did not eat anything unusual.     Interval history / Subjective:     Patient reports loose BM last night with another small one this morning. Denies any nausea, vomiting, or abdominal pain. Continues to report fatigue and decrease appetite.       Assessment & Plan:     Cdiff Diarrhea  -+cdiff  -add Flagyl PO  -IVF    Sepsis likely from cdiff  -fevers, pancytopenia on admission   -blood cultures NGTD  -CT abd/pelvis/chest showing pelvic lytic lesions, no acute process  -IVF   -UA negative  -monitor labs    Wt loss   -related to the above vs h/o ETOH abuse vs possible multiple myeloma  -nutrition consult    Pancytopenia  -related to infectious process vs likely multiple myeloma  -hematology following, Findings from CT were noted and overall concerning for a bone marrow disorder such as lymphoproliferative disorders or a aplasia. He has also lost a significant amount of weight, but has no other B symptoms  -CT abd/pelvis Lytic bone lesions of the pelvis, suspicious for possible multiple myeloma. -CEA normal, Ca 19-9 pending  -PLT count up today  -check bone scan and bone marrow bx    Elevated Blood Pressure  -elevated on admission, now wnl  -monitor    Elevated Lipase with hyperbilirubinuria, and ascited  -unlikely pancreatitis   -GI following plan for dx paracentesis   -IVF, monitor labs    ETOH/Tobacco abuse  -reports last drink was last Friday  -Banana bag   -ativan prn   -nicotine patch prn   -cessation education provided    Electrolyte Imbalance- hypokalemia, hypomagnesemia, hypophosphatemia  -replenished  -monitor labs    Code status: Full  DVT prophylaxis: SCDs    Care Plan discussed with: Patient/Family and Nurse Dr Luis F Aragon  Disposition: Home w/Family and TBD     Hospital Problems  Date Reviewed: 8/24/2017          Codes Class Noted POA    * (Principal)Clostridium difficile diarrhea ICD-10-CM: A04.7  ICD-9-CM: 008.45  8/24/2017 Yes        Hypokalemia ICD-10-CM: E87.6  ICD-9-CM: 276.8  8/24/2017 Yes        Hypomagnesemia ICD-10-CM: E83.42  ICD-9-CM: 275.2  8/24/2017 Yes        Hypophosphatemia ICD-10-CM: E83.39  ICD-9-CM: 275.3  8/24/2017 Unknown        Elevated lipase ICD-10-CM: R74.8  ICD-9-CM: 790.5  8/24/2017 Yes        Pancytopenia ([facility] ICD-10-CM: X84.986  ICD-9-CM: 284.19  8/24/2017 Yes        Sepsis ([facility] ICD-10-CM: A41.9  ICD-9-CM: 038.9, 995.91  8/24/2017 Yes                Review of Systems:   A comprehensive review of systems was negative except for that written in the HPI. Vital Signs:    Last 24hrs VS reviewed since prior progress note.  Most recent are:  Visit Vitals    /81 (BP 1 Location: Left arm, BP Patient Position: At rest)    Pulse 60    Temp 98.5 °F (36.9 °C)    Resp 20    Ht 5' 6.5\" (1.689 m)    Wt 54 kg (119 lb 2 oz)    SpO2 98%    BMI 18.94 kg/m2 No intake or output data in the 24 hours ending 08/24/17 1500     Physical Examination:             Constitutional:  No acute distress, cooperative, pleasant, thin ill appearing male   ENT:  Oral mucous moist, oropharynx benign. Neck supple,    Resp:  CTA bilaterally. No wheezing/rhonchi/rales. No accessory muscle use   CV:  Regular rhythm, normal rate, no murmurs, gallops, rubs    GI:  Soft, non distended, non tender. normoactive bowel sounds, no hepatosplenomegaly     Musculoskeletal:  No edema, warm, 2+ pulses throughout    Neurologic:  Moves all extremities. AAOx3     Psych:  Good insight, Not anxious nor agitated. Data Review:    Review and/or order of clinical lab test  Review and/or order of tests in the radiology section of CPT  Review and/or order of tests in the medicine section of CPT      Labs:     Recent Labs      08/24/17 0555 08/23/17 0419   WBC  3.0*  2.5*   HGB  11.8*  10.5*   HCT  34.2*  30.3*   PLT  53*  28*     Recent Labs      08/24/17 0555 08/23/17 0419 08/22/17   1735   NA  134*  138  137   K  3.0*  3.4*  3.7   CL  101  102  98   CO2  24  28  29   BUN  7  12  12   CREA  0.96  0.96  1.09   GLU  60*  69  88   CA  7.9*  8.0*  8.8   MG  1.5*  1.3*   --    PHOS  2.3*  2.2*   --      Recent Labs      08/24/17 0555 08/23/17 0419 08/22/17   1735   SGOT  97*  102*  116*   ALT  45  44  52   AP  105  105  122*   TBILI  2.0*  1.6*  1.9*   TP  6.9  6.2*  7.6   ALB  3.2*  2.9*  3.6   GLOB  3.7  3.3  4.0   AML   --   126*   --    LPSE  619*  621*   --      Recent Labs      08/24/17 0555   INR  1.4*   PTP  14.0*   APTT  34.3*      Recent Labs      08/23/17 0419   TIBC  175*   PSAT  30   FERR  1531*      Lab Results   Component Value Date/Time    Folate 24.7 08/23/2017 04:19 AM      No results for input(s): PH, PCO2, PO2 in the last 72 hours. No results for input(s): CPK, CKNDX, TROIQ in the last 72 hours.     No lab exists for component: CPKMB  No results found for: CHOL, CHOLX, CHLST, CHOLV, HDL, LDL, LDLC, DLDLP, TGLX, TRIGL, TRIGP, CHHD, CHHDX  No results found for: [facility]  Lab Results   Component Value Date/Time    Color YELLOW/STRAW 08/23/2017 02:47 PM    Appearance CLEAR 08/23/2017 02:47 PM    Specific gravity 1.010 08/23/2017 02:47 PM    pH (UA) 6.0 08/23/2017 02:47 PM    Protein NEGATIVE  08/23/2017 02:47 PM    Glucose NEGATIVE  08/23/2017 02:47 PM    Ketone TRACE 08/23/2017 02:47 PM    Bilirubin NEGATIVE  08/23/2017 02:47 PM    Urobilinogen 0.2 08/23/2017 02:47 PM    Nitrites NEGATIVE  08/23/2017 02:47 PM    Leukocyte Esterase NEGATIVE  08/23/2017 02:47 PM    Epithelial cells FEW 08/23/2017 02:47 PM    Bacteria NEGATIVE  08/23/2017 02:47 PM    WBC 0-4 08/23/2017 02:47 PM    RBC 0-5 08/23/2017 02:47 PM         Medications Reviewed:     Current Facility-Administered Medications   Medication Dose Route Frequency    0.9% sodium chloride with KCl 40 mEq/L infusion   IntraVENous CONTINUOUS    potassium phosphate 15 mmol in 0.9% sodium chloride 250 mL infusion   IntraVENous ONCE    metroNIDAZOLE (FLAGYL) tablet 500 mg  500 mg Oral TID    potassium chloride SR (KLOR-CON 10) tablet 40 mEq  40 mEq Oral Q4H    0.9% sodium chloride 4,515 mL with folic acid 1 mg, thiamine 100 mg, mvi, adult no. 4 with vit K 10 mL infusion   IntraVENous Q24H    acetaminophen (TYLENOL) tablet 650 mg  650 mg Oral Q4H PRN    ondansetron (ZOFRAN) injection 4 mg  4 mg IntraVENous Q4H PRN    LORazepam (ATIVAN) injection 1 mg  1 mg IntraVENous Q4H PRN    nicotine (NICODERM CQ) 21 mg/24 hr patch 1 Patch  1 Patch TransDERmal DAILY PRN     ______________________________________________________________________  EXPECTED LENGTH OF STAY: 2d 16h  ACTUAL LENGTH OF STAY:          [address], NP

## 2017-08-24 NOTE — PROGRESS NOTES
Spoke with Rachael Diaz in microbiology. She stated that patient has tested positive for C-diff. She will update in [facility].

## 2017-08-25 VITALS
OXYGEN SATURATION: 99 % | WEIGHT: 119.13 LBS | HEIGHT: 67 IN | BODY MASS INDEX: 18.7 KG/M2 | HEART RATE: 75 BPM | RESPIRATION RATE: 18 BRPM | TEMPERATURE: 99.1 F | SYSTOLIC BLOOD PRESSURE: 103 MMHG | DIASTOLIC BLOOD PRESSURE: 81 MMHG

## 2017-08-25 LAB
ALBUMIN MFR UR ELPH: 9.5 %
ALBUMIN SERPL-MCNC: 3.1 G/DL (ref 3.5–5)
ALBUMIN/GLOB SERPL: 0.9 {RATIO} (ref 1.1–2.2)
ALP SERPL-CCNC: 103 U/L (ref 45–117)
ALPHA1 GLOB MFR UR ELPH: 3.5 %
ALPHA2 GLOB 24H MFR UR ELPH: 11.8 %
ALT SERPL-CCNC: 41 U/L (ref 12–78)
ANION GAP SERPL CALC-SCNC: 7 MMOL/L (ref 5–15)
AST SERPL-CCNC: 74 U/L (ref 15–37)
B-GLOBULIN 24H MFR UR ELPH: 38.6 %
BACTERIA SPEC CULT: NORMAL
BASOPHILS # BLD: 0 K/UL (ref 0–0.1)
BASOPHILS NFR BLD: 0 % (ref 0–1)
BILIRUB SERPL-MCNC: 1.6 MG/DL (ref 0.2–1)
BUN SERPL-MCNC: 11 MG/DL (ref 6–20)
BUN/CREAT SERPL: 11 (ref 12–20)
C JEJUNI+C COLI AG STL QL: NEGATIVE
CALCIUM SERPL-MCNC: 8.1 MG/DL (ref 8.5–10.1)
CANCER AG19-9 SERPL-ACNC: 111 U/ML (ref 0–35)
CHLORIDE SERPL-SCNC: 104 MMOL/L (ref 97–108)
CO2 SERPL-SCNC: 26 MMOL/L (ref 21–32)
CREAT SERPL-MCNC: 0.96 MG/DL (ref 0.7–1.3)
E COLI SXT1+2 STL IA: NEGATIVE
EOSINOPHIL # BLD: 0 K/UL (ref 0–0.4)
EOSINOPHIL NFR BLD: 1 % (ref 0–7)
ERYTHROCYTE [DISTWIDTH] IN BLOOD BY AUTOMATED COUNT: 11.9 % (ref 11.5–14.5)
GAMMA GLOB 24H MFR UR ELPH: 36.5 %
GLOBULIN SER CALC-MCNC: 3.3 G/DL (ref 2–4)
GLUCOSE SERPL-MCNC: 73 MG/DL (ref 65–100)
HAV IGM SER QL: NONREACTIVE
HBV CORE IGM SER QL: NONREACTIVE
HBV SURFACE AG SER QL: 0.23 INDEX
HBV SURFACE AG SER QL: NEGATIVE
HCT VFR BLD AUTO: 31 % (ref 36.6–50.3)
HCV AB SERPL QL IA: NONREACTIVE
HCV COMMENT,HCGAC: NORMAL
HGB BLD-MCNC: 10.7 G/DL (ref 12.1–17)
HIV 1+2 AB+HIV1 P24 AG SERPL QL IA: NONREACTIVE
HIV12 RESULT COMMENT, HHIVC: NORMAL
INTERPRETATION UR IFE-IMP: NORMAL
LIPASE SERPL-CCNC: 557 U/L (ref 73–393)
LYMPHOCYTES # BLD: 0.5 K/UL (ref 0.8–3.5)
LYMPHOCYTES NFR BLD: 21 % (ref 12–49)
M PROTEIN MFR UR ELPH: NORMAL %
MAGNESIUM SERPL-MCNC: 1.3 MG/DL (ref 1.6–2.4)
MCH RBC QN AUTO: 35.4 PG (ref 26–34)
MCHC RBC AUTO-ENTMCNC: 34.5 G/DL (ref 30–36.5)
MCV RBC AUTO: 102.6 FL (ref 80–99)
MONOCYTES # BLD: 0.3 K/UL (ref 0–1)
MONOCYTES NFR BLD: 13 % (ref 5–13)
NEUTS SEG # BLD: 1.6 K/UL (ref 1.8–8)
NEUTS SEG NFR BLD: 65 % (ref 32–75)
PHOSPHATE SERPL-MCNC: 2.1 MG/DL (ref 2.6–4.7)
PLATELET # BLD AUTO: 55 K/UL (ref 150–400)
PLEASE NOTE:, 133800: NORMAL
POTASSIUM SERPL-SCNC: 4 MMOL/L (ref 3.5–5.1)
PROT SERPL-MCNC: 6.4 G/DL (ref 6.4–8.2)
PROT UR-MCNC: 8.6 MG/DL
RBC # BLD AUTO: 3.02 M/UL (ref 4.1–5.7)
SERVICE CMNT-IMP: NORMAL
SODIUM SERPL-SCNC: 137 MMOL/L (ref 136–145)
SP1: NORMAL
SP2: NORMAL
SP3: NORMAL
TSH SERPL DL<=0.05 MIU/L-ACNC: 3.67 UIU/ML (ref 0.36–3.74)
WBC # BLD AUTO: 2.6 K/UL (ref 4.1–11.1)

## 2017-08-25 PROCEDURE — 74011250636 HC RX REV CODE- 250/636: Performed by: INTERNAL MEDICINE

## 2017-08-25 PROCEDURE — 74011250636 HC RX REV CODE- 250/636: Performed by: HOSPITALIST

## 2017-08-25 PROCEDURE — 84100 ASSAY OF PHOSPHORUS: CPT | Performed by: INTERNAL MEDICINE

## 2017-08-25 PROCEDURE — 87389 HIV-1 AG W/HIV-1&-2 AB AG IA: CPT | Performed by: INTERNAL MEDICINE

## 2017-08-25 PROCEDURE — 80053 COMPREHEN METABOLIC PANEL: CPT | Performed by: INTERNAL MEDICINE

## 2017-08-25 PROCEDURE — 83735 ASSAY OF MAGNESIUM: CPT | Performed by: INTERNAL MEDICINE

## 2017-08-25 PROCEDURE — 74011250637 HC RX REV CODE- 250/637: Performed by: HOSPITALIST

## 2017-08-25 PROCEDURE — 74011000250 HC RX REV CODE- 250: Performed by: INTERNAL MEDICINE

## 2017-08-25 PROCEDURE — 84443 ASSAY THYROID STIM HORMONE: CPT | Performed by: INTERNAL MEDICINE

## 2017-08-25 PROCEDURE — 74011250637 HC RX REV CODE- 250/637: Performed by: NURSE PRACTITIONER

## 2017-08-25 PROCEDURE — 36415 COLL VENOUS BLD VENIPUNCTURE: CPT | Performed by: INTERNAL MEDICINE

## 2017-08-25 PROCEDURE — 83690 ASSAY OF LIPASE: CPT | Performed by: INTERNAL MEDICINE

## 2017-08-25 PROCEDURE — 85025 COMPLETE CBC W/AUTO DIFF WBC: CPT | Performed by: INTERNAL MEDICINE

## 2017-08-25 RX ORDER — MAGNESIUM SULFATE 1 G/100ML
1 INJECTION INTRAVENOUS ONCE
Status: COMPLETED | OUTPATIENT
Start: 2017-08-25 | End: 2017-08-25

## 2017-08-25 RX ORDER — METRONIDAZOLE 500 MG/1
500 TABLET ORAL 3 TIMES DAILY
Qty: 30 TAB | Refills: 0 | Status: SHIPPED | OUTPATIENT
Start: 2017-08-25 | End: 2017-09-12 | Stop reason: ALTCHOICE

## 2017-08-25 RX ORDER — SODIUM,POTASSIUM PHOSPHATES 280-250MG
1 POWDER IN PACKET (EA) ORAL ONCE
Status: COMPLETED | OUTPATIENT
Start: 2017-08-25 | End: 2017-08-25

## 2017-08-25 RX ADMIN — METRONIDAZOLE 500 MG: 250 TABLET ORAL at 15:52

## 2017-08-25 RX ADMIN — METRONIDAZOLE 500 MG: 250 TABLET ORAL at 09:29

## 2017-08-25 RX ADMIN — FOLIC ACID: 5 INJECTION, SOLUTION INTRAMUSCULAR; INTRAVENOUS; SUBCUTANEOUS at 05:20

## 2017-08-25 RX ADMIN — POTASSIUM & SODIUM PHOSPHATES POWDER PACK 280-160-250 MG 1 PACKET: 280-160-250 PACK at 10:50

## 2017-08-25 RX ADMIN — MAGNESIUM SULFATE HEPTAHYDRATE 1 G: 1 INJECTION, SOLUTION INTRAVENOUS at 10:50

## 2017-08-25 NOTE — PROGRESS NOTES
Hospitalist Progress Note  Leon Quiroz MD  Office: [phone]        Date of Service:  2017  NAME:  Danielle Thacker  :  1970  MRN:  [id number]      Admission Summary:   47-yom without any significant pmh, who was in his usual state of health until about 2 weeks ago when the pt developed diarrhea, which is progressive, about 3-4 loose stools per day. This is associated with decreased appetite as well as a dry mouth. Pt has not been able to tolerate food as a result of the diarrhea. The diarrhea is not associated with abdominal pain. He has lost about 40 pounds within the past year. The weight loss was unintentional. It was reported by the sister that the patient has history of alcohol abuse. Pt denies that. His last alcohol consumption was 5 days ago. Pt also has significant weakness and fatigue. He was sent home from work today because of the increased weakness and fatigue. Pt denies fever, rigors, and chills. The diarrhea is not associated with nausea and vomiting. No recent history of travel, no sick contacts, the patient did not eat anything unusual.     Interval history / Subjective:     Patient doing well today. Reports no diarrhea recurrence overnight. Awaiting paracentesis, reports GI spoke to him about this yest. Wife also present and updated. Wife not in contact prec, strongly encouraged wife to adhere to contact prec and ensure good hand washing to which she acknowledged she is doing.       Assessment & Plan:     Cdiff Diarrhea  -+cdiff  --cont Flagyl PO, started   -IVF    Sepsis likely from cdiff, resolving  -fevers, pancytopenia on admission   -blood cultures NGTD  -CT abd/pelvis/chest showing pelvic lytic lesions, no acute process  -IVF   -UA negative  -monitor labs    Wt loss   -related to the above vs h/o ETOH abuse vs possible multiple myeloma  -nutrition consult    Pancytopenia  -related to infectious process vs likely multiple myeloma  -hematology following, Findings from CT were noted and overall concerning for a bone marrow disorder such as lymphoproliferative disorders or aplasia. He has also lost a significant amount of weight, but has no other B symptoms  -CT C/abd/pelvis 8/23 Lytic bone lesions of the pelvis, suspicious for possible multiple myeloma. -CEA normal, Ca 19-9 elevated  -PLT count stable today 55 from 48 yest, 33k on admit  -Bone scan 8/24 vague increased tracer activity medial right iliac wing corresponding to lytic lesion seen on the CT examination. Other lesions are not well demonstrated by bone scan.   -Bone marrow bx 8/24, results pend    Elevated Blood Pressure  -elevated on admission, now wnl  -cont to monitor    Elevated Lipase with hyperbilirubinuria, and ascites  -unlikely pancreatitis as per GI (ct a/p with normal pancreas, liver, spleen)  -GI following, plan for dx paracentesis   -IVF, monitor labs    ETOH/Tobacco abuse  -reports last drink was last Friday  -Banana bag   -ativan prn   -nicotine patch prn   -cessation education provided    Electrolyte Imbalance- hypokalemia, hypomagnesemia, hypophosphatemia  -replenish  -monitor labs,repeat in am    Antic poss d/c home over wknd pend diarrhea ok, and ok with GI, Onc with o/ot Onc f/u 1-2 wks for BM bx results and further mgmt as d/w pt and wife     Code status: Full  DVT prophylaxis: SCDs    Care Plan discussed with: Patient/Family and Nurse   Disposition: Home w/Family and TBD     Hospital Problems  Date Reviewed: 8/24/2017          Codes Class Noted POA    * (Principal)Clostridium difficile diarrhea ICD-10-CM: A04.7  ICD-9-CM: 008.45  8/24/2017 Yes        Hypokalemia ICD-10-CM: E87.6  ICD-9-CM: 276.8  8/24/2017 Yes        Hypomagnesemia ICD-10-CM: E83.42  ICD-9-CM: 275.2  8/24/2017 Yes        Hypophosphatemia ICD-10-CM: E83.39  ICD-9-CM: 275.3  8/24/2017 Unknown        Elevated lipase ICD-10-CM: R74.8  ICD-9-CM: 790.5  8/24/2017 Yes Pancytopenia ([facility] ICD-10-CM: K09.628  ICD-9-CM: 284.19  8/24/2017 Yes        Sepsis ([facility] ICD-10-CM: A41.9  ICD-9-CM: 038.9, 995.91  8/24/2017 Yes                Review of Systems:   A comprehensive review of systems was negative except for that written in the HPI. Vital Signs:    Last 24hrs VS reviewed since prior progress note. Most recent are:  Visit Vitals    /82 (BP 1 Location: Right arm, BP Patient Position: At rest)    Pulse 61    Temp 98.9 °F (37.2 °C)    Resp 18    Ht 5' 6.5\" (1.689 m)    Wt 54 kg (119 lb 2 oz)    SpO2 98%    BMI 18.94 kg/m2         Intake/Output Summary (Last 24 hours) at 08/25/17 0936  Last data filed at 08/25/17 0441   Gross per 24 hour   Intake                0 ml   Output              700 ml   Net             -700 ml        Physical Examination:             Constitutional:  No acute distress, cooperative, pleasant, thin ill appearing male   ENT:  Oral mucous moist, oropharynx benign. Neck supple,    Resp:  CTA bilaterally. No wheezing/rhonchi/rales. No accessory muscle use   CV:  Regular rhythm, normal rate, no murmurs, gallops, rubs    GI:  Soft, non distended, non tender. normoactive bowel sounds, no hepatosplenomegaly     Musculoskeletal:  No edema, warm, 2+ pulses throughout    Neurologic:  Moves all extremities. AAOx3     Psych:  Good insight, Not anxious nor agitated.        Data Review:    Review and/or order of clinical lab test  Review and/or order of tests in the radiology section of CPT  Review and/or order of tests in the medicine section of CPT      Labs:     Recent Labs      08/25/17   0404  08/24/17   0555   WBC  2.6*  3.0*   HGB  10.7*  11.8*   HCT  31.0*  34.2*   PLT  55*  53*     Recent Labs      08/25/17   0404  08/24/17   0555  08/23/17   0419   NA  137  134*  138   K  4.0  3.0*  3.4*   CL  104  101  102   CO2  26  24  28   BUN  11  7  12   CREA  0.96  0.96  0.96   GLU  73  60*  69   CA  8.1*  7.9*  8.0*   MG  1.3*  1.5*  1.3*   PHOS 2. 1*  2.3*  2.2*     Recent Labs      08/25/17   0404  08/24/17   0555  08/23/17   1447  08/23/17   0419   SGOT  74*  97*   --   102*   ALT  41  45   --   44   AP  103  105   --   105   TBILI  1.6*  2.0*   --   1.6*   TP  6.4  6.9  6.6  6.2*   ALB  3.1*  3.2*   --   2.9*   GLOB  3.3  3.7   --   3.3   AML   --    --    --   126*   LPSE  557*  619*   --   621*     Recent Labs      08/24/17   0555   INR  1.4*   PTP  14.0*   APTT  34.3*      Recent Labs      08/23/17 0419   TIBC  175*   PSAT  30   FERR  1531*      Lab Results   Component Value Date/Time    Folate 24.7 08/23/2017 04:19 AM      No results for input(s): PH, PCO2, PO2 in the last 72 hours. No results for input(s): CPK, CKNDX, TROIQ in the last 72 hours.     No lab exists for component: CPKMB  No results found for: CHOL, CHOLX, CHLST, CHOLV, HDL, LDL, LDLC, DLDLP, TGLX, TRIGL, TRIGP, CHHD, CHHDX  No results found for: [facility]  Lab Results   Component Value Date/Time    Color YELLOW/STRAW 08/23/2017 02:47 PM    Appearance CLEAR 08/23/2017 02:47 PM    Specific gravity 1.010 08/23/2017 02:47 PM    pH (UA) 6.0 08/23/2017 02:47 PM    Protein NEGATIVE  08/23/2017 02:47 PM    Glucose NEGATIVE  08/23/2017 02:47 PM    Ketone TRACE 08/23/2017 02:47 PM    Bilirubin NEGATIVE  08/23/2017 02:47 PM    Urobilinogen 0.2 08/23/2017 02:47 PM    Nitrites NEGATIVE  08/23/2017 02:47 PM    Leukocyte Esterase NEGATIVE  08/23/2017 02:47 PM    Epithelial cells FEW 08/23/2017 02:47 PM    Bacteria NEGATIVE  08/23/2017 02:47 PM    WBC 0-4 08/23/2017 02:47 PM    RBC 0-5 08/23/2017 02:47 PM         Medications Reviewed:     Current Facility-Administered Medications   Medication Dose Route Frequency    potassium, sodium phosphates (NEUTRA-PHOS) packet 1 Packet  1 Packet Oral ONCE    magnesium sulfate 1 g/100 ml IVPB (premix or compounded)  1 g IntraVENous ONCE    0.9% sodium chloride with KCl 40 mEq/L infusion   IntraVENous CONTINUOUS    metroNIDAZOLE (FLAGYL) tablet 500 mg  500 mg Oral TID    0.9% sodium chloride 4,746 mL with folic acid 1 mg, thiamine 100 mg, mvi, adult no. 4 with vit K 10 mL infusion   IntraVENous Q24H    acetaminophen (TYLENOL) tablet 650 mg  650 mg Oral Q4H PRN    ondansetron (ZOFRAN) injection 4 mg  4 mg IntraVENous Q4H PRN    LORazepam (ATIVAN) injection 1 mg  1 mg IntraVENous Q4H PRN    nicotine (NICODERM CQ) 21 mg/24 hr patch 1 Patch  1 Patch TransDERmal DAILY PRN     ______________________________________________________________________  EXPECTED LENGTH OF STAY: 2d 16h  ACTUAL LENGTH OF STAY:          3                 Elvira Kunz MD

## 2017-08-25 NOTE — DISCHARGE SUMMARY
Discharge Summary       PATIENT ID: Deon Felder  MRN: [id number]   YOB: 1970    DATE OF ADMISSION: 8/22/2017  6:15 PM    DATE OF DISCHARGE: 8/25/2017  PRIMARY CARE PROVIDER: None     ATTENDING PHYSICIAN: Ignacio Smith MD  DISCHARGING PROVIDER: Ignacio Smith MD    To contact this individual call [phone] and ask the  to page. If unavailable ask to be transferred the Adult Hospitalist Department. CONSULTATIONS: IP CONSULT TO HEMATOLOGY  IP CONSULT TO GASTROENTEROLOGY    PROCEDURES/SURGERIES: * No surgery found *    ADMITTING DIAGNOSES & HOSPITAL COURSE:   47-yom without any significant pmh, who was in his usual state of health until about 2 weeks ago when the pt developed diarrhea, which is progressive, about 3-4 loose stools per day. This is associated with decreased appetite as well as a dry mouth. Pt has not been able to tolerate food as a result of the diarrhea. The diarrhea is not associated with abdominal pain. He has lost about 40 pounds within the past year. The weight loss was unintentional. It was reported by the sister that the patient has history of alcohol abuse. Pt denies that. His last alcohol consumption was 5 days ago. Pt also has significant weakness and fatigue. Pt underwent BM bx 8/24, results pend at time of d/c. He also underwent bone scan  8/24 vague increased tracer activity medial right iliac wing corresponding to lytic lesion seen on the CT examination. Pt was followed by GI and Onc. Prelim for BM bx is inconclusive for MM/MDS dx. Pt also dx with c diff, started on flagyl as of 8/24. He has had signif improvement of the diarrhea. Pt also had ascites, but mild. As per GI, no indic for paracentesis inpt since minimal ascites and sec to low platelt counts but may poss be considered as o/pt. He has been cleared for d/c home today and will f/u with GI as well as Onc to review final pend results.         DISCHARGE DIAGNOSES / PLAN:      Cdiff Diarrhea  -+cdiff confirmed  --cont Flagyl PO, started 8/24  -IVF recvd     SimpleSepsis likely from cdiff, resolved (POA)  -fevers, pancytopenia on admission   -blood cultures NGTD  -CT abd/pelvis/chest showing pelvic lytic lesions, no acute process  -UA negative  -monitor labs     Wt loss   -related to the above vs h/o ETOH abuse vs possible multiple myeloma  -nutrition consulted     Pancytopenia  -related to infectious process vs likely multiple myeloma  -hematology following, Findings from CT were noted and overall concerning for a bone marrow disorder such as lymphoproliferative disorders or aplasia. He has also lost a significant amount of weight, but has no other B symptoms  -CT C/abd/pelvis 8/23 Lytic bone lesions of the pelvis, suspicious for possible multiple myeloma. -CEA normal, Ca 19-9 elevated  -PLT count stable today 55 from 48 yest, 33k on admit  -Bone scan 8/24 vague increased tracer activity medial right iliac wing corresponding to lytic lesion seen on the CT examination. Other lesions are not well demonstrated by bone scan.   -Bone marrow bx 8/24, results pend     Elevated Blood Pressure  -elevated on admission, now wnl  -cont to monitor     Elevated Lipase with hyperbilirubinuria, and ascites  -unlikely pancreatitis as per GI (ct a/p with normal pancreas, liver, spleen)  -GI following, plan for dx paracentesis   -IVF, monitor labs     ETOH/Tobacco abuse  -reports last drink was last Friday  -Banana bag   -ativan prn   -nicotine patch prn   -cessation education provided     Electrolyte Imbalance- hypokalemia, hypomagnesemia, hypophosphatemia  -replenish  -monitor labs,repeat in am        Code status: Full  DVT prophylaxis: SCDs     Care Plan discussed with: Patient/Family and Nurse   Disposition: Home w/Family        PENDING TEST RESULTS:   At the time of discharge the following test results are still pending:BM bx, FISH    FOLLOW UP APPOINTMENTS:    Follow-up Information     Follow up With Details Comments Contact Info    Jai Hopkins MD In 1 week f/u  1-2 wks, to review BM bx results [address] [facility]  [address] [address]      Yennifer Butt, [address] [facility] [address]  [phone]             ADDITIONAL CARE RECOMMENDATIONS:   -pend BM bx results  -complete flagyl course for c diff  -CMP 1 wk    DIET: GI lite, low fiber       ACTIVITY: Activity as tolerated    WOUND CARE: none    EQUIPMENT needed: none          DISCHARGE MEDICATIONS:  Current Discharge Medication List      START taking these medications    Details   metroNIDAZOLE (FLAGYL) 500 mg tablet Take 1 Tab by mouth three (3) times daily. Qty: 30 Tab, Refills: 0         CONTINUE these medications which have NOT CHANGED    Details   therapeutic multivitamin (THERA) tablet Take 1 Tab by mouth daily. NOTIFY YOUR PHYSICIAN FOR ANY OF THE FOLLOWING:   Fever over 101 degrees for 24 hours. Chest pain, shortness of breath, fever, chills, nausea, vomiting, diarrhea, change in mentation, falling, weakness, bleeding. Severe pain or pain not relieved by medications. Or, any other signs or symptoms that you may have questions about.     DISPOSITION:  x  Home With:   OT  PT  HH  RN       Long term SNF/Inpatient Rehab    Independent/assisted living    Hospice    Other:       PATIENT CONDITION AT DISCHARGE:     Functional status    Poor     Deconditioned    x Independent      Cognition   x  Lucid     Forgetful     Dementia      Catheters/lines (plus indication)    Garrido     PICC     PEG    x None      Code status   x  Full code     DNR      PHYSICAL EXAMINATION AT DISCHARGE:   Refer to Progress Note      CHRONIC MEDICAL DIAGNOSES:  Problem List as of 8/25/2017  Date Reviewed: 8/24/2017          Codes Class Noted - Resolved    * (Principal)Clostridium difficile diarrhea ICD-10-CM: A04.7  ICD-9-CM: 008.45  8/24/2017 - Present        Hypokalemia ICD-10-CM: E87.6  ICD-9-CM: 276.8  8/24/2017 - Present Hypomagnesemia ICD-10-CM: E83.42  ICD-9-CM: 275.2  8/24/2017 - Present        Hypophosphatemia ICD-10-CM: E83.39  ICD-9-CM: 275.3  8/24/2017 - Present        Elevated lipase ICD-10-CM: R74.8  ICD-9-CM: 790.5  8/24/2017 - Present        Pancytopenia ([facility] ICD-10-CM: Y98.101  ICD-9-CM: 284.19  8/24/2017 - Present        Sepsis ([facility] ICD-10-CM: A41.9  ICD-9-CM: 038.9, 995.91  8/24/2017 - Present        RESOLVED: Diarrhea ICD-10-CM: R19.7  ICD-9-CM: 787.91  8/22/2017 - 8/24/2017              Greater than 35 minutes were spent with the patient on counseling and coordination of care    Signed:   Soumya Snowden MD  8/25/2017  6:19 PM

## 2017-08-25 NOTE — PROGRESS NOTES
Bedside and Verbal shift change report given to Kwesi Dexter (oncoming nurse) by Can Power RN (offgoing nurse). Report included the following information SBAR, Kardex, Intake/Output, MAR and Recent Results.

## 2017-08-25 NOTE — CDMP QUERY
The medical record documents a Dx of SEPSIS (NP Progress notes on 08/23/2017 @ 2438)----Can you please further Specify the severity of the Sepsis, as well as the POA status. ...      => SIMPLE SEPSIS (POA)    =>SEVERE SEPSIS (POA)      The medical record reflects the following clinical findings, treatment, and risk factors:    Risk Factors:  Ruling out C-diff in pt who presents with c/o x2 weeks of diarrhea and weight loss-Fatigue      Clinical Indicators: At time of the admisson--Ruling out C-diff infection---Upon arrival WBC @ 2.5----HR > 90--Temp up to 100.5--- Total Bob @ 1.9 08/22/2017 @ 1735---Platelets @ 33  (06/84/9473 @ 1735)    Treatment: Started on Flagyl on 08/24/2017 @ 10am---ORAL--- Blood Cx. 08/23/2017 @ 1447    Please clarify and document your clinical opinion in the progress notes and discharge summary including the definitive and/or presumptive diagnosis, (suspected or probable), related to the above clinical findings. Please include clinical findings supporting your diagnosis.     Thank you,   Dc Chung, ERMIASN, RN, [address]

## 2017-08-25 NOTE — DISCHARGE INSTRUCTIONS
Discharge Instructions       PATIENT ID: Delores Ordoñez  MRN: [id number]   YOB: 1970    DATE OF ADMISSION: 8/22/2017  6:15 PM    DATE OF DISCHARGE: 8/25/2017    PRIMARY CARE PROVIDER: None     ATTENDING PHYSICIAN: Lin Estrada MD  DISCHARGING PROVIDER: Lin Estrada MD    To contact this individual call [phone] and ask the  to page. If unavailable ask to be transferred the Adult Hospitalist Department. DISCHARGE DIAGNOSES   c diff  Ascites   elevated liver enzymes  Lytic bone lesions      CONSULTATIONS: IP CONSULT TO HEMATOLOGY  IP CONSULT TO GASTROENTEROLOGY    PROCEDURES/SURGERIES: * No surgery found *    PENDING TEST RESULTS:   At the time of discharge the following test results are still pending:BM bx results    FOLLOW UP APPOINTMENTS:   Follow-up Information     Follow up With Details Comments Contact Nicki Padgett MD In 1 week f/u  1-2 wks, to review BM bx results [address] [place] [address]  [address] [facility], [address]  [phone]             ADDITIONAL CARE RECOMMENDATIONS:   -pend BM bx results  -complete flagyl course for c diff  Need CMP labs drawn 1 wk    DIET: GI lite, low fiber       ACTIVITY: Activity as tolerated    WOUND CARE: none    EQUIPMENT needed: none      DISCHARGE MEDICATIONS:   See Medication Reconciliation Form    · It is important that you take the medication exactly as they are prescribed. · Keep your medication in the bottles provided by the pharmacist and keep a list of the medication names, dosages, and times to be taken in your wallet. · Do not take other medications without consulting your doctor. NOTIFY YOUR PHYSICIAN FOR ANY OF THE FOLLOWING:   Fever over 101 degrees for 24 hours. Chest pain, shortness of breath, fever, chills, nausea, vomiting, diarrhea, change in mentation, falling, weakness, bleeding.  Severe pain or pain not relieved by medications. Or, any other signs or symptoms that you may have questions about.       DISPOSITION:   x Home With:   OT  PT  HH  RN       SNF/Inpatient Rehab/LTAC    Independent/assisted living    Hospice    Other:     CDMP Checked:   Yes x     PROBLEM LIST Updated:  Yes x       Signed:   Herminia Mcdonnell MD  8/25/2017  6:18 PM

## 2017-08-25 NOTE — PROGRESS NOTES
[address]  [address], [address]       GI PROGRESS NOTE  [facility]  [phone] NP in-hospital cell phone M-F until 4:30  After 5pm or on weekends, please call  for physician on call      NAME: Lisa Stewart   :  1970   MRN:  [id number]       Subjective:   Patient reports improvement in diarrhea, he is tolerating diet well. He denies any other complaints. Objective:     VITALS:   Last 24hrs VS reviewed since prior progress note. Most recent are:  Visit Vitals    /81 (BP 1 Location: Left arm, BP Patient Position: At rest)    Pulse 75    Temp 99.1 °F (37.3 °C)    Resp 18    Ht 5' 6.5\" (1.689 m)    Wt 54 kg (119 lb 2 oz)    SpO2 99%    BMI 18.94 kg/m2       PHYSICAL EXAM:  General: Cooperative, no acute distress    Neurologic:  Alert and oriented X 3. HEENT: EOMI, muddy sclera   Lungs:  CTA bilaterally. No wheezing  Heart:  S1 S2, regular rhythm, no murmur   Abdomen: taught, non-distended, no tenderness. +Bowel sounds  Extremities: No edema  Psych:   Good insight. Not anxious or agitated. Lab Data Reviewed:     Recent Results (from the past 24 hour(s))   MAGNESIUM    Collection Time: 17  4:04 AM   Result Value Ref Range    Magnesium 1.3 (L) 1.6 - 2.4 mg/dL   PHOSPHORUS    Collection Time: 17  4:04 AM   Result Value Ref Range    Phosphorus 2.1 (L) 2.6 - 4.7 MG/DL   CBC WITH AUTOMATED DIFF    Collection Time: 17  4:04 AM   Result Value Ref Range    WBC 2.6 (L) 4.1 - 11.1 K/uL    RBC 3.02 (L) 4.10 - 5.70 M/uL    HGB 10.7 (L) 12.1 - 17.0 g/dL    HCT 31.0 (L) 36.6 - 50.3 %    .6 (H) 80.0 - 99.0 FL    MCH 35.4 (H) 26.0 - 34.0 PG    MCHC 34.5 30.0 - 36.5 g/dL    RDW 11.9 11.5 - 14.5 %    PLATELET 55 (L) 292 - 400 K/uL    NEUTROPHILS 65 32 - 75 %    LYMPHOCYTES 21 12 - 49 %    MONOCYTES 13 5 - 13 %    EOSINOPHILS 1 0 - 7 %    BASOPHILS 0 0 - 1 %    ABS.  NEUTROPHILS 1.6 (L) 1.8 - 8.0 K/UL ABS. LYMPHOCYTES 0.5 (L) 0.8 - 3.5 K/UL    ABS. MONOCYTES 0.3 0.0 - 1.0 K/UL    ABS. EOSINOPHILS 0.0 0.0 - 0.4 K/UL    ABS. BASOPHILS 0.0 0.0 - 0.1 K/UL   LIPASE    Collection Time: 08/25/17  4:04 AM   Result Value Ref Range    Lipase 557 (H) 73 - 701 U/L   METABOLIC PANEL, COMPREHENSIVE    Collection Time: 08/25/17  4:04 AM   Result Value Ref Range    Sodium 137 136 - 145 mmol/L    Potassium 4.0 3.5 - 5.1 mmol/L    Chloride 104 97 - 108 mmol/L    CO2 26 21 - 32 mmol/L    Anion gap 7 5 - 15 mmol/L    Glucose 73 65 - 100 mg/dL    BUN 11 6 - 20 MG/DL    Creatinine 0.96 0.70 - 1.30 MG/DL    BUN/Creatinine ratio 11 (L) 12 - 20      GFR est AA >60 >60 ml/min/1.73m2    GFR est non-AA >60 >60 ml/min/1.73m2    Calcium 8.1 (L) 8.5 - 10.1 MG/DL    Bilirubin, total 1.6 (H) 0.2 - 1.0 MG/DL    ALT (SGPT) 41 12 - 78 U/L    AST (SGOT) 74 (H) 15 - 37 U/L    Alk. phosphatase 103 45 - 117 U/L    Protein, total 6.4 6.4 - 8.2 g/dL    Albumin 3.1 (L) 3.5 - 5.0 g/dL    Globulin 3.3 2.0 - 4.0 g/dL    A-G Ratio 0.9 (L) 1.1 - 2.2     HIV 1/2 AG/AB, 4TH GENERATION,W RFLX CONFIRM    Collection Time: 08/25/17  4:09 AM   Result Value Ref Range    HIV 1/2 Interpretation NONREACTIVE NR      HIV 1/2 result comment SEE NOTE     TSH 3RD GENERATION    Collection Time: 08/25/17  4:15 AM   Result Value Ref Range    TSH 3.67 0.36 - 3.74 uIU/mL            Assessment:   · Diarrhea: resolving, C. Diff +  · Elevated AST: heavy alcohol history, down-trending   · Elevated lipase: no nausea or abdominal pain  · Ascites: malignant vs liver dysfunction      Patient Active Problem List   Diagnosis Code    Clostridium difficile diarrhea A04.7    Hypokalemia E87.6    Hypomagnesemia E83.42    Hypophosphatemia E83.39    Elevated lipase R74.8    Pancytopenia (HCC) D61.818    Sepsis ([address] A41.9     Plan:   · Agree with Flagyl  · Await bone marrow biopsy prior to paracentesis  · If discharged can follow-up with Dr. Shahab Esqueda outpatient  · Stable for discharge from GI standpoint     Signed By: Sergio Swift NP     8/25/2017  3:23 PM         Addendum:  Pt independently seen and examined. He does not have massive ascites and may be a difficult tap. His cell lines continue to be depressed but the platlet count is starting to rise - hopeful trend. The final bone marrow biopsy is still pending but initial impression is inconclusive for MM. Heme/onc plans to see the patient in follow up as an outpatient for final report. My preference is to complete rx for C dif and trend labs. Once platlets are in a safe range and if path is non-diagnostic, we can continue workup for new ascites and r/o liver disease. If he remains in the hospital tomorrow and the platlets are rising, it would be reasonable to obtain ultrasound to assess for amount of ascites and feasibility of tap. If he is discharged, he should see Dr. Fer Rodriguez in the office in 2 weeks.

## 2017-08-25 NOTE — PROGRESS NOTES
Hematology/Oncology Progress Note    REASON FOR VISIT: Pancytopenia    HISTORY OF PRESENT ILLNESS: Mr. Pratima Valentino is a 56 y.o. male who we are asked to see in consultation for pancytopenia. Patient presented to the Emergency Room with diarrhea x 2 weeks. He denies any blood or mucous in his stool. He states that he has been feeling more fatigued recently. This has interfered with his work. States that his appetite has been decreased and he has lost 30 pounds in the last month or so. He has not been trying to lose weight. He has not been experiencing any nausea, vomiting, or abdominal pain. He denies any headaches, dizziness, or vision changes. He denies shortness of breath or chest pain. Denies hematuria or changes in voiding patterns. He denies recent travel out of the country, recent surgery/trauma, recent antibiotic use, or bleeding. Denies any recent blood product transfusions or exposure to heparin. No fevers. He states that he does not routinely follow with Primary Care. Describes himself as overall very healthy until a few weeks ago. No personal history of bleeding disorders or cancer. Denies family history of bleeding or clotting disorders. His father and both grandparents with cancer. States his father had prostate cancer but he is unsure of type of malignancy his grandfather's had. He smokes cigarettes socially per his report. States he has only smoked for about 2 years. Drinks alcohol. States that he drinks a few shots of liquor and a few beers every other day. Note history of heavy alcohol use per family reports. Denies illicit drug use. INTERVAL HISTORY: He is resting in bed with sister, Saman Baez, present. Slept well overnight. Diarrhea has resolved. No fevers. Energy is better. No pain  History reviewed. No pertinent past medical history. History reviewed. No pertinent surgical history.     Allergies   Allergen Reactions    Aspirin Rash       Current Facility-Administered Medications Medication Dose Route Frequency Provider Last Rate Last Dose    0.9% sodium chloride with KCl 40 mEq/L infusion   IntraVENous CONTINUOUS Ada Reese NP 75 mL/hr at 08/24/17 1511      metroNIDAZOLE (FLAGYL) tablet 500 mg  500 mg Oral TID Ada Reese NP   500 mg at 08/25/17 1552    0.9% sodium chloride 0,032 mL with folic acid 1 mg, thiamine 100 mg, mvi, adult no. 4 with vit K 10 mL infusion   IntraVENous Q24H Kiera Rodriguez MD        acetaminophen (TYLENOL) tablet 650 mg  650 mg Oral Q4H PRN Kiera Rodriguez MD        ondansetron (ZOFRAN) injection 4 mg  4 mg IntraVENous Q4H PRN Kiera Rodriguez MD        LORazepam (ATIVAN) injection 1 mg  1 mg IntraVENous Q4H PRN Kiera Rordiguez MD   1 mg at 08/24/17 1331    nicotine (NICODERM CQ) 21 mg/24 hr patch 1 Patch  1 Patch TransDERmal DAILY PRN Ada Reese NP           Social History     Social History    Marital status:      Spouse name: N/A    Number of children: N/A    Years of education: N/A     Social History Main Topics    Smoking status: Current Every Day Smoker     Packs/day: 0.10    Smokeless tobacco: Never Used    Alcohol use Yes      Comment: every other day    Drug use: No    Sexual activity: Not Asked     Other Topics Concern    None     Social History Narrative       History reviewed. No pertinent family history. ROS  As per the HPI, otherwise a comprehensive ROS is negative. ECOG PS is 1. Emotional well being addressed and patient is coping well.     Physical Examination:   Visit Vitals    /81 (BP 1 Location: Left arm, BP Patient Position: At rest)    Pulse 75    Temp 99.1 °F (37.3 °C)    Resp 18    Ht 5' 6.5\" (1.689 m)    Wt 119 lb 2 oz (54 kg)    SpO2 99%    BMI 18.94 kg/m2     General appearance - alert, pleasant, conversant, no distress  Mental status - oriented to person, place, and time  Mouth - mucous membranes moist, pharynx normal without lesions  Neck - supple, no significant adenopathy  Lymphatics - no palpable lymphadenopathy, no hepatosplenomegaly  Chest - clear to auscultation, no wheezes, rales or rhonchi, symmetric air entry  Heart - normal rate, regular rhythm, normal S1, S2, no murmurs, rubs, clicks or gallops  Abdomen - soft, nontender, nondistended, no masses or organomegaly, bowel sounds present  Neurological - normal speech, no focal findings or movement disorder noted  Extremities - peripheral pulses normal, no pedal edema  Skin - warm, dry, intact    LABS  Lab Results   Component Value Date/Time    WBC 2.6 08/25/2017 04:04 AM    HGB 10.7 08/25/2017 04:04 AM    HCT 31.0 08/25/2017 04:04 AM    PLATELET 55 76/65/9866 04:04 AM    .6 08/25/2017 04:04 AM    ABS. NEUTROPHILS 1.6 08/25/2017 04:04 AM     Lab Results   Component Value Date/Time    Sodium 137 08/25/2017 04:04 AM    Potassium 4.0 08/25/2017 04:04 AM    Chloride 104 08/25/2017 04:04 AM    CO2 26 08/25/2017 04:04 AM    Glucose 73 08/25/2017 04:04 AM    BUN 11 08/25/2017 04:04 AM    Creatinine 0.96 08/25/2017 04:04 AM    GFR est AA >60 08/25/2017 04:04 AM    GFR est non-AA >60 08/25/2017 04:04 AM    Calcium 8.1 08/25/2017 04:04 AM     Lab Results   Component Value Date/Time    AST (SGOT) 74 08/25/2017 04:04 AM    Alk. phosphatase 103 08/25/2017 04:04 AM    Protein, total 6.4 08/25/2017 04:04 AM    Albumin 3.1 08/25/2017 04:04 AM    Globulin 3.3 08/25/2017 04:04 AM    A-G Ratio 0.9 08/25/2017 04:04 AM     Lab Results   Component Value Date/Time    Reticulocyte count 1.2 08/24/2017 05:55 AM    Iron % saturation 30 08/23/2017 04:19 AM    TIBC 175 08/23/2017 04:19 AM    Ferritin 1531 08/23/2017 04:19 AM    Vitamin B12 491 08/23/2017 04:19 AM    Folate 24.7 08/23/2017 04:19 AM    Haptoglobin 99 08/24/2017 05:55 AM     08/24/2017 05:55 AM    TSH 3.67 08/25/2017 04:15 AM    M-spike Not Observed 08/23/2017 02:47 PM    Lipase 557 08/25/2017 04:04 AM    Hep C  virus Ab Interp.  NONREACTIVE 08/24/2017 05:55 AM     Lab Results   Component Value Date/Time    INR 1.4 08/24/2017 05:55 AM    aPTT 34.3 08/24/2017 05:55 AM    Fibrinogen 197 08/24/2017 05:55 AM       Gammopathy eval pending but so far mostly unremarkable    Smear:  Leukocytopenia without dysmyelopoiesis. Occasional reactive   lymphocytes. Thrombocytopenia without platelet aggregates. RBCs   demonstrate mild anisocytosis. IMAGING  CT C/A/P 8/23/17  IMPRESSION:  1. Abdominal and pelvic ascites. 2. Lytic bone lesions of the pelvis, suspicious for possible multiple myeloma. ASSESSMENT  Mr. Yamilex Wilkinson is a 56 y.o. male with history of alcohol abuse who presented with diarrhea, fatigue, and weight loss and was found to have  new pancytopenia and lytic lesions on CT for which we are consulted. He also has a ascites of undetermined etiology    DISCUSSION/PLAN  1. Pancytopenia, acute. Preceded by a nonbloody diarrheal illness. Reviewed labs obtained by the primary team which have ruled out iron deficiency and B12 deficiency. No evidence of hemolysis, HIV and Hep serologies are negative. Counts improving. BM biopsy done    Preliminary path showed some decrease in cellularity , normal maturation, few dysplastic cells not sufficient to call this MDS. No lymphoma or plasma cell clones, FISH is pending    Await FISH    Suspect acute cytopenias secondary to acute infectious diarrhea    2. Lytic Lesions. Noted on CT C/A/P. Bone scan however unremarkable and I do not believe the lytic lesions on CT are malignant. CEA normal. Ca 19-9 elevated? 3. Diarrhea. Improving. He is positive for C. Dif and on flagyl. Appreciate Hospitalist management. 4. Hyperbilirubinemia, abdominal pancreatic enzymes and ascites. CT reviewed personally and notable for normal liver and spleen. His pancreas is also unremarkable. Unclear etiology for elevated enzymes, ascites and CA 19-9. Discussed with Dr. Stacy Jones- GI.  Outpatient ascitic tap is planned as platelets continue to improve    If he is discharged over the weekend we will see him in clinic in 1-2 weeks to go over the results    Signed by: Maribel Johns MD                     August 25, 2017

## 2017-08-25 NOTE — PROGRESS NOTES
Hospitalist Progress Note  Sung Suazo MD  Office: [phone]        Date of Service:  2017  NAME:  Belen Hunt  :  1970  MRN:  [id number]      Admission Summary:   47-yom without any significant pmh, who was in his usual state of health until about 2 weeks ago when the pt developed diarrhea, which is progressive, about 3-4 loose stools per day. This is associated with decreased appetite as well as a dry mouth. Pt has not been able to tolerate food as a result of the diarrhea. The diarrhea is not associated with abdominal pain. He has lost about 40 pounds within the past year. The weight loss was unintentional. It was reported by the sister that the patient has history of alcohol abuse. Pt denies that. His last alcohol consumption was 5 days ago. Pt also has significant weakness and fatigue. He was sent home from work today because of the increased weakness and fatigue. Pt denies fever, rigors, and chills. The diarrhea is not associated with nausea and vomiting. No recent history of travel, no sick contacts, the patient did not eat anything unusual.     Interval history / Subjective:     Patient doing well today. Reports no diarrhea recurrence overnight. Awaiting paracentesis, reports GI spoke to him about this yest. Wife also present and updated. Wife not in contact prec, strongly encouraged wife to adhere to contact prec and ensure good hand washing to which she acknowledged she is doing.       Assessment & Plan:     Cdiff Diarrhea  -+cdiff  --cont Flagyl PO, started   -IVF    Sepsis likely from cdiff, resolving  -fevers, pancytopenia on admission   -blood cultures NGTD  -CT abd/pelvis/chest showing pelvic lytic lesions, no acute process  -IVF   -UA negative  -monitor labs    Wt loss   -related to the above vs h/o ETOH abuse vs possible multiple myeloma  -nutrition consult    Pancytopenia  -related to infectious process vs likely multiple myeloma  -hematology following, Findings from CT were noted and overall concerning for a bone marrow disorder such as lymphoproliferative disorders or aplasia. He has also lost a significant amount of weight, but has no other B symptoms  -CT C/abd/pelvis 8/23 Lytic bone lesions of the pelvis, suspicious for possible multiple myeloma. -CEA normal, Ca 19-9 elevated  -PLT count stable today 55 from 48 yest, 33k on admit  -Bone scan 8/24 vague increased tracer activity medial right iliac wing corresponding to lytic lesion seen on the CT examination. Other lesions are not well demonstrated by bone scan.   -Bone marrow bx 8/24, results pend    Elevated Blood Pressure  -elevated on admission, now wnl  -cont to monitor    Elevated Lipase with hyperbilirubinuria, and ascites  -unlikely pancreatitis as per GI (ct a/p with normal pancreas, liver, spleen)  -GI following, plan for dx paracentesis   -IVF, monitor labs    ETOH/Tobacco abuse  -reports last drink was last Friday  -Banana bag   -ativan prn   -nicotine patch prn   -cessation education provided    Electrolyte Imbalance- hypokalemia, hypomagnesemia, hypophosphatemia  -replenish  -monitor labs,repeat in am    Code status: Full  DVT prophylaxis: SCDs    Care Plan discussed with: Patient/Family and Nurse   Disposition: Home w/Family and TBD     Hospital Problems  Date Reviewed: 8/24/2017          Codes Class Noted POA    * (Principal)Clostridium difficile diarrhea ICD-10-CM: A04.7  ICD-9-CM: 008.45  8/24/2017 Yes        Hypokalemia ICD-10-CM: E87.6  ICD-9-CM: 276.8  8/24/2017 Yes        Hypomagnesemia ICD-10-CM: E83.42  ICD-9-CM: 275.2  8/24/2017 Yes        Hypophosphatemia ICD-10-CM: E83.39  ICD-9-CM: 275.3  8/24/2017 Unknown        Elevated lipase ICD-10-CM: R74.8  ICD-9-CM: 790.5  8/24/2017 Yes        Pancytopenia ([facility] [address] ICD-10-CM: M46.889  ICD-9-CM: 284.19  8/24/2017 Yes        Sepsis ([facility] [address] ICD-10-CM: A41.9  ICD-9-CM: 038.9, 995.91  8/24/2017 Yes                Review of Systems:   A comprehensive review of systems was negative except for that written in the HPI. Vital Signs:    Last 24hrs VS reviewed since prior progress note. Most recent are:  Visit Vitals    /80 (BP 1 Location: Right arm, BP Patient Position: At rest;Head of bed elevated (Comment degrees))    Pulse 68    Temp 99.1 °F (37.3 °C)    Resp 20    Ht 5' 6.5\" (1.689 m)    Wt 54 kg (119 lb 2 oz)    SpO2 98%    BMI 18.94 kg/m2         Intake/Output Summary (Last 24 hours) at 08/25/17 0804  Last data filed at 08/25/17 0441   Gross per 24 hour   Intake                0 ml   Output              700 ml   Net             -700 ml        Physical Examination:             Constitutional:  No acute distress, cooperative, pleasant, thin ill appearing male   ENT:  Oral mucous moist, oropharynx benign. Neck supple,    Resp:  CTA bilaterally. No wheezing/rhonchi/rales. No accessory muscle use   CV:  Regular rhythm, normal rate, no murmurs, gallops, rubs    GI:  Soft, non distended, non tender. normoactive bowel sounds, no hepatosplenomegaly     Musculoskeletal:  No edema, warm, 2+ pulses throughout    Neurologic:  Moves all extremities. AAOx3     Psych:  Good insight, Not anxious nor agitated.        Data Review:    Review and/or order of clinical lab test  Review and/or order of tests in the radiology section of CPT  Review and/or order of tests in the medicine section of CPT      Labs:     Recent Labs      08/25/17 0404 08/24/17   0555   WBC  2.6*  3.0*   HGB  10.7*  11.8*   HCT  31.0*  34.2*   PLT  55*  53*     Recent Labs      08/25/17   0404  08/24/17   0555  08/23/17   0419   NA  137  134*  138   K  4.0  3.0*  3.4*   CL  104  101  102   CO2  26  24  28   BUN  11  7  12   CREA  0.96  0.96  0.96   GLU  73  60*  69   CA  8.1*  7.9*  8.0*   MG  1.3*  1.5*  1.3*   PHOS  2.1*  2.3*  2.2*     Recent Labs      08/25/17   0404  08/24/17   0555  08/23/17   1447  08/23/17   0419   OT 74*  97*   --   102*   ALT  41  45   --   44   AP  103  105   --   105   TBILI  1.6*  2.0*   --   1.6*   TP  6.4  6.9  6.6  6.2*   ALB  3.1*  3.2*   --   2.9*   GLOB  3.3  3.7   --   3.3   AML   --    --    --   126*   LPSE  557*  619*   --   621*     Recent Labs      08/24/17   0555   INR  1.4*   PTP  14.0*   APTT  34.3*      Recent Labs      08/23/17   0419   TIBC  175*   PSAT  30   FERR  1531*      Lab Results   Component Value Date/Time    Folate 24.7 08/23/2017 04:19 AM      No results for input(s): PH, PCO2, PO2 in the last 72 hours. No results for input(s): CPK, CKNDX, TROIQ in the last 72 hours. No lab exists for component: CPKMB  No results found for: CHOL, CHOLX, CHLST, CHOLV, HDL, LDL, LDLC, DLDLP, TGLX, TRIGL, TRIGP, CHHD, CHHDX  No results found for: [facility]  Lab Results   Component Value Date/Time    Color YELLOW/STRAW 08/23/2017 02:47 PM    Appearance CLEAR 08/23/2017 02:47 PM    Specific gravity 1.010 08/23/2017 02:47 PM    pH (UA) 6.0 08/23/2017 02:47 PM    Protein NEGATIVE  08/23/2017 02:47 PM    Glucose NEGATIVE  08/23/2017 02:47 PM    Ketone TRACE 08/23/2017 02:47 PM    Bilirubin NEGATIVE  08/23/2017 02:47 PM    Urobilinogen 0.2 08/23/2017 02:47 PM    Nitrites NEGATIVE  08/23/2017 02:47 PM    Leukocyte Esterase NEGATIVE  08/23/2017 02:47 PM    Epithelial cells FEW 08/23/2017 02:47 PM    Bacteria NEGATIVE  08/23/2017 02:47 PM    WBC 0-4 08/23/2017 02:47 PM    RBC 0-5 08/23/2017 02:47 PM         Medications Reviewed:     Current Facility-Administered Medications   Medication Dose Route Frequency    0.9% sodium chloride with KCl 40 mEq/L infusion   IntraVENous CONTINUOUS    metroNIDAZOLE (FLAGYL) tablet 500 mg  500 mg Oral TID    0.9% sodium chloride 8,711 mL with folic acid 1 mg, thiamine 100 mg, mvi, adult no. 4 with vit K 10 mL infusion   IntraVENous Q24H    acetaminophen (TYLENOL) tablet 650 mg  650 mg Oral Q4H PRN    ondansetron (ZOFRAN) injection 4 mg  4 mg IntraVENous Q4H PRN    LORazepam (ATIVAN) injection 1 mg  1 mg IntraVENous Q4H PRN    nicotine (NICODERM CQ) 21 mg/24 hr patch 1 Patch  1 Patch TransDERmal DAILY PRN     ______________________________________________________________________  EXPECTED LENGTH OF STAY: 2d 16h  ACTUAL LENGTH OF STAY:          3                 Coral East MD

## 2017-08-25 NOTE — PROGRESS NOTES
Reviewed discharge instructions with patient and wife via teachback method. Emphasized importance to keep follow up appointments with oncology and GI. Verbalized understanding. Reviewed appropriate times to take antibiotics and verbalized understanding to finish course even if symptoms subsided. Pt discharged with all personal belongings and RXs.

## 2017-08-25 NOTE — PROGRESS NOTES
Patient's wife requested I make a note of medications that were given during bone marrow biopsy caused the patient to be agitated and out of it. She states she thought the medication was propofol and ativan but wasn't sure.

## 2017-08-27 LAB
O+P SPEC MICRO: NORMAL
O+P STL CONC: NORMAL
SPECIMEN SOURCE: NORMAL

## 2017-08-28 LAB
BACTERIA SPEC CULT: NORMAL
SERVICE CMNT-IMP: NORMAL

## 2017-09-12 ENCOUNTER — OFFICE VISIT (OUTPATIENT)
Dept: ONCOLOGY | Age: 47
End: 2017-09-12

## 2017-09-12 ENCOUNTER — TELEPHONE (OUTPATIENT)
Dept: ONCOLOGY | Age: 47
End: 2017-09-12

## 2017-09-12 VITALS
TEMPERATURE: 99.3 F | RESPIRATION RATE: 20 BRPM | HEIGHT: 67 IN | HEART RATE: 90 BPM | OXYGEN SATURATION: 99 % | DIASTOLIC BLOOD PRESSURE: 72 MMHG | BODY MASS INDEX: 19.9 KG/M2 | WEIGHT: 126.8 LBS | SYSTOLIC BLOOD PRESSURE: 108 MMHG

## 2017-09-12 DIAGNOSIS — D61.818 PANCYTOPENIA (HCC): Primary | ICD-10-CM

## 2017-09-12 DIAGNOSIS — R74.8 ELEVATED LIPASE: ICD-10-CM

## 2017-09-12 DIAGNOSIS — D61.818 PANCYTOPENIA (HCC): ICD-10-CM

## 2017-09-12 DIAGNOSIS — M89.8X5 LYTIC BONE LESION OF HIP: ICD-10-CM

## 2017-09-12 DIAGNOSIS — R79.89 ABNORMAL LFTS: ICD-10-CM

## 2017-09-12 NOTE — PROGRESS NOTES
Hematology New patient    REASON FOR CONSULT: Pancytopenia    HISTORY OF PRESENT ILLNESS: Mr. Kat Yoo is a 56 y.o. male who presents for evaluation of pancytopenia. On 8/23/17 Patient presented to the Emergency Room with diarrhea x 2 weeks, without blood, nausea, fevers or sick contacts. He also reported a weight loss of 30 lb in 1 month with increasing fatigue. He was noted to be pancytopenic. He was found to be C diff positive and received Flagyl with improvement in symptoms. He was noted to have pancytopenia. He has pancytopenia going back to 2014 with most recent CBC on 8/25/17 showing a WBC count of 2600, ANC 1600, Hb 10.7 g/dl, , Platelets 86Z. Of note, the thrombocytopenia is new since 8/22/17. Work up on 8/23/17 showed a normal B12 of 491, iron studies did not suggest a deficiency. Haptoglobin normal at 94, LFTs were abnormal, lipase was elevated. BM biopsy on 8/24/17 showed Mildly hypocellular marrow with trilineage hematopoiesis, no evidence of MDS, Myeloma or Lymphoma, FISH negative. Gammopathy evaluation, CT scan and bone scan negative other than a possible Rt pelvic lytic lesion and small ascites. He now returns to discuss further management. Comes with his wife. Diarrhea has resolved, he has no bleeding, has better energy. No fevers, has chills, he does wake up some mornings with sweats but this has been going on for 20 years. No cough , CP. Has no nausea, he has noted 2 new bruises for 2 weeks. Stopped ETOH since hospital admission, was smoking 1 ppd x 20 years and stopped 1 month ago. No personal history of bleeding disorders or cancer. Denies family history of bleeding or clotting disorders. His father and both grandparents with cancer. States his father had prostate cancer but he is unsure of type of malignancy his grandfather's had.       History reviewed. No pertinent past medical history.     History reviewed.  No pertinent surgical history.       Allergies   Allergen Reactions    Aspirin Rash       Current Outpatient Prescriptions   Medication Sig Dispense Refill    B.infantis-B.ani-B.long-B.bifi (PROBIOTIC 4X) 10-15 mg TbEC Take  by mouth.  therapeutic multivitamin (THERA) tablet Take 1 Tab by mouth daily. Social History     Social History    Marital status:      Spouse name: N/A    Number of children: N/A    Years of education: N/A     Social History Main Topics    Smoking status: Current Every Day Smoker     Packs/day: 0.10    Smokeless tobacco: Never Used    Alcohol use Yes      Comment: every other day    Drug use: No    Sexual activity: Not Asked     Other Topics Concern    None     Social History Narrative       FH reviewed in HPI    ROS  A 12 point review of systems was obtained and is negative except as listed in HPI.   ECOG PS is 0      Physical Examination:   Visit Vitals    /72    Pulse 90    Temp 99.3 °F (37.4 °C)    Resp 20    Ht 5' 6.5\" (1.689 m)    Wt 126 lb 12.8 oz (57.5 kg)    SpO2 99%    BMI 20.16 kg/m2     General appearance - alert, well appearing, and in no distress  Mental status - oriented to person, place, and time  Mouth - mucous membranes moist, pharynx normal without lesions  Neck - supple, no significant adenopathy  Lymphatics - no palpable lymphadenopathy, no hepatosplenomegaly  Chest - clear to auscultation, no wheezes, rales or rhonchi, symmetric air entry  Heart - normal rate, regular rhythm, normal S1, S2, no murmurs, rubs, clicks or gallops  Abdomen - soft, nontender, nondistended, no masses or organomegaly, bowel sounds present  Back exam - full range of motion, no tenderness, palpable spasm or pain on motion  Neurological - normal speech, no focal findings or movement disorder noted  Musculoskeletal - no joint tenderness, deformity or swelling  Extremities - peripheral pulses normal, no pedal edema, no clubbing or cyanosis  Skin - normal coloration and turgor, no rashes, no suspicious skin lesions noted    LABS  Lab Results   Component Value Date/Time    WBC 2.6 08/25/2017 04:04 AM    HGB 10.7 08/25/2017 04:04 AM    HCT 31.0 08/25/2017 04:04 AM    PLATELET 55 73/33/7389 04:04 AM    .6 08/25/2017 04:04 AM    ABS. NEUTROPHILS 1.6 08/25/2017 04:04 AM     Lab Results   Component Value Date/Time    Sodium 137 08/25/2017 04:04 AM    Potassium 4.0 08/25/2017 04:04 AM    Chloride 104 08/25/2017 04:04 AM    CO2 26 08/25/2017 04:04 AM    Glucose 73 08/25/2017 04:04 AM    BUN 11 08/25/2017 04:04 AM    Creatinine 0.96 08/25/2017 04:04 AM    GFR est AA >60 08/25/2017 04:04 AM    GFR est non-AA >60 08/25/2017 04:04 AM    Calcium 8.1 08/25/2017 04:04 AM     Lab Results   Component Value Date/Time    AST (SGOT) 74 08/25/2017 04:04 AM    Alk. phosphatase 103 08/25/2017 04:04 AM    Protein, total 6.4 08/25/2017 04:04 AM    Albumin 3.1 08/25/2017 04:04 AM    Globulin 3.3 08/25/2017 04:04 AM    A-G Ratio 0.9 08/25/2017 04:04 AM     Lab Results   Component Value Date/Time    Reticulocyte count 1.2 08/24/2017 05:55 AM    Iron % saturation 30 08/23/2017 04:19 AM    TIBC 175 08/23/2017 04:19 AM    Ferritin 1531 08/23/2017 04:19 AM    Vitamin B12 491 08/23/2017 04:19 AM    Folate 24.7 08/23/2017 04:19 AM    Haptoglobin 99 08/24/2017 05:55 AM     08/24/2017 05:55 AM    TSH 3.67 08/25/2017 04:15 AM    M-spike Not Observed 08/23/2017 02:47 PM    Lipase 557 08/25/2017 04:04 AM    Hep C  virus Ab Interp. NONREACTIVE 08/24/2017 05:55 AM     Lab Results   Component Value Date/Time    INR 1.4 08/24/2017 05:55 AM    aPTT 34.3 08/24/2017 05:55 AM    Fibrinogen 197 08/24/2017 05:55 AM     HIV non reactive    PATHOLOGY  BM biopsy 8/24/17  Bone marrow:   Mildly hypocellular marrow with trilineage hematopoiesis. See comment. Peripheral blood:   Pancytopenia. See CBC data. Comment   The bone marrow is slightly hypocellular for age to reveal trilineage hematopoiesis with adequate maturation.  Occasional atypical/dysplastic erythroid elements are seen, but they comprise <10% of the erythroid cell line. No significant dysplasia is identified in the myeloid and megakaryocytic lineages. Approximately 5% polytypic plasma cells are identified. No atypical lymphocytic infiltrates are observed. Clinical history indicates pancytopenia with lytic bone lesions of the pelvis, without M spike by SPEP and normal free light chain ratio. Overall morphologic findings are insufficient for a diagnosis of myelodysplastic syndrome. Correlation with clinical and cytogenetics/molecular findings is advised to rule out a low-grade myelodysplastic syndrome with minimal morphologic evidence of dysplasia. There is no evidence of plasma cell myeloma or lymphoma . FISH and Karyotype are normal    IMAGING  CT CAP 8/23/17  MPRESSION  IMPRESSION:  1. Abdominal and pelvic ascites. 2. Lytic bone lesions of the pelvis, suspicious for possible multiple myeloma.     Bone scan 8/23/17  IMPRESSION: Very vague increased tracer activity medial right iliac wing  corresponding to lytic lesion seen on the CT examination. Other lesions are not  well demonstrated by bone scan.     ASSESSMENT  Mr. Annalise Jaramillo is a 56 y.o. male with pancytopenia, C diff diarrhea in Aug 2017 , abnormal LFTs, elevated lipase, ascites and a solitary pelvic bone lytic lesion    PLAN    Pancytopenia  Leucopenia has been present since 2014 and has had relatively stable macrocytic anemia for the same time period. However, had significantly worsened thrombocytopenia when admitted with C diff diarrhea with platelets of 23 K on 8/23.    Work up has ruled out iron deficiency, B12 deficiency, HIV, Hepatitis, MDS, Lymphoma, Myeloma    - obtain CBC diff today and call with results    If counts improved that BM suppression from acute infection and possibly alcoholic hepatitis may explain findings during admission    Abnormal LFTs  On 8/25/17 noted to have a Bb of 1.6, alb 3.1, AST 74  CT with no hepatosplenomegaly, did have mild ascites  Was seen by GI (Dr. Vernon Landeros) with a plan for outpatient follow up  Suspect mild alcoholic hepatitis, he has abstained from alcohol for 1 month, will recheck LFTS and if abnormal will need to see GI      Elevated Lipase  621 on admission, again no CT abnormalities  Repeat lipase    Lytic lesion    Solitary, negative bone scan (mild uptake only), do not believe this is metastatic    Will call with labs obtained today, if improving will see with   CBC diff in 3 months. Would also consider reimaging the lytic lesion at some point    Carito Rodas MD    Mr. Rafael Bruner has a reminder for a \"due or due soon\" health maintenance. I have asked that he contact his primary care provider for follow-up on this health maintenance.

## 2017-09-12 NOTE — PROGRESS NOTES
Simone Vicente is a 56 y.o. male here today as a new patient, pancytopenia. States bruised area to right side of chest, some discomfort noted with movement.

## 2017-09-12 NOTE — MR AVS SNAPSHOT
Visit Information Date & Time Provider Department Dept. Phone Encounter #  
 9/12/2017  2:00 PM Tara Malloy  [facility] at [address] [id number] Your Appointments 12/8/2017  9:00 AM  
Follow Up with Tara Malloy MD  
[address] [facility] Appt Note: 3 month follow up [address] [facility] [address] [facility] [address] [id number]  
[phone]  
  
   
 [address] [facility] [address] Upcoming Health Maintenance Date Due Pneumococcal 19-64 Highest Risk (1 of 3 - PCV13) 1/4/1990 DTaP/Tdap/Td series (1 - Tdap) 1/4/1992 INFLUENZA AGE 9 TO ADULT 8/1/2017 Allergies as of 9/12/2017  Review Complete On: 9/12/2017 By: Alexus Castellano Severity Noted Reaction Type Reactions Aspirin  10/28/2011    Rash Current Immunizations  Never Reviewed No immunizations on file. Not reviewed this visit You Were Diagnosed With   
  
 Codes Comments [facility]    -  Primary ICD-10-CM: M62.340 ICD-9-CM: 284.19 Abnormal LFTs     ICD-10-CM: R79.89 ICD-9-CM: 790.6 Vitals BP Pulse Temp Resp Height(growth percentile) Weight(growth percentile) 108/72 90 99.3 °F (37.4 °C) 20 5' 6.5\" (1.689 m) 126 lb 12.8 oz (57.5 kg) SpO2 BMI Smoking Status 99% 20.16 kg/m2 Current Every Day Smoker Vitals History BMI and BSA Data Body Mass Index Body Surface Area  
 20.16 kg/m 2 1.64 m 2 Preferred Pharmacy Pharmacy Name Phone [facility] [address]  [place] [phone] Your Updated Medication List  
  
   
This list is accurate as of: 9/12/17  2:56 PM.  Always use your most recent med list.  
  
  
  
  
 PROBIOTIC 4X 10-15 mg Tbec Generic drug:  B.infantis-B.ani-B.long-B.bifi Take  by mouth. THERA tablet Generic drug:  therapeutic multivitamin Take 1 Tab by mouth daily. To-Do List   
 09/12/2017 Lab:  CBC WITH AUTOMATED DIFF   
  
 09/12/2017 Lab:  LIPASE   
  
 09/12/2017 Lab:  METABOLIC PANEL, COMPREHENSIVE   
  
 09/15/2017 Outpatient Referral:  REFERRAL TO LIVER HEPATOLOGY   
  
 12/12/2017 Lab:  CBC WITH AUTOMATED DIFF Referral Information Referral ID Referred By Referred To  
  
 [id number] Jairo Maldonado MD   
   [address] [facility] [address] [facility], [address] Phone: [phone] Fax: [phone] Visits Status Start Date End Date 1 New Request 9/12/17 9/12/18 If your referral has a status of pending review or denied, additional information will be sent to support the outcome of this decision. Introducing [facility] SERVICES! [address] [facility] introduces [employer] patient portal. Now you can access parts of your medical record, email your doctor's office, and request medication refills online. 1. In your internet browser, go to https://[employer]. [employer] 2. Click on the First Time User? Click Here link in the Sign In box. You will see the New Member Sign Up page. 3. Enter your [employer] Access Code exactly as it appears below. You will not need to use this code after youve completed the sign-up process. If you do not sign up before the expiration date, you must request a new code. · [employer] Access Code: [email or web address] Expires: 11/21/2017 11:55 AM 
 
4. Enter the last four digits of your Social Security Number (xxxx) and Date of Birth (mm/dd/yyyy) as indicated and click Submit. You will be taken to the next sign-up page. 5. Create a [employer] ID. This will be your [employer] login ID and cannot be changed, so think of one that is secure and easy to remember. 6. Create a [employer] password. You can change your password at any time. 7. Enter your Password Reset Question and Answer.  This can be used at a later time if you forget your password. 8. Enter your e-mail address. You will receive e-mail notification when new information is available in [address] 9. Click Sign Up. You can now view and download portions of your medical record. 10. Click the Download Summary menu link to download a portable copy of your medical information. If you have questions, please visit the Frequently Asked Questions section of the [employer] website. Remember, [employer] is NOT to be used for urgent needs. For medical emergencies, dial 911. Now available from your iPhone and Android! Please provide this summary of care documentation to your next provider. Your primary care clinician is listed as NONE. If you have any questions after today's visit, please call [phone].

## 2017-09-12 NOTE — TELEPHONE ENCOUNTER
Provider referring pt to liver hepatology; Dr. Olaf Kimball to be evaluated for alcohol related liver disease. Next available appt with Dr. Olaf Kimball is Jan 2018. Provider needs pt to be seen sooner than Jan. 2018. VM message left to return writers call.

## 2017-09-13 LAB
ALBUMIN SERPL-MCNC: 4.1 G/DL (ref 3.5–5.5)
ALBUMIN/GLOB SERPL: 1.9 {RATIO} (ref 1.2–2.2)
ALP SERPL-CCNC: 98 IU/L (ref 39–117)
ALT SERPL-CCNC: 53 IU/L (ref 0–44)
AST SERPL-CCNC: 75 IU/L (ref 0–40)
BASOPHILS # BLD AUTO: 0 X10E3/UL (ref 0–0.2)
BASOPHILS NFR BLD AUTO: 0 %
BILIRUB SERPL-MCNC: 0.7 MG/DL (ref 0–1.2)
BUN SERPL-MCNC: 10 MG/DL (ref 6–24)
BUN/CREAT SERPL: 9 (ref 9–20)
CALCIUM SERPL-MCNC: 8.8 MG/DL (ref 8.7–10.2)
CHLORIDE SERPL-SCNC: 100 MMOL/L (ref 96–106)
CO2 SERPL-SCNC: 26 MMOL/L (ref 18–29)
CREAT SERPL-MCNC: 1.08 MG/DL (ref 0.76–1.27)
EOSINOPHIL # BLD AUTO: 0.1 X10E3/UL (ref 0–0.4)
EOSINOPHIL NFR BLD AUTO: 2 %
ERYTHROCYTE [DISTWIDTH] IN BLOOD BY AUTOMATED COUNT: 13.1 % (ref 12.3–15.4)
GLOBULIN SER CALC-MCNC: 2.2 G/DL (ref 1.5–4.5)
GLUCOSE SERPL-MCNC: 90 MG/DL (ref 65–99)
HCT VFR BLD AUTO: 27.3 % (ref 37.5–51)
HGB BLD-MCNC: 8.9 G/DL (ref 12.6–17.7)
IMM GRANULOCYTES # BLD: 0 X10E3/UL (ref 0–0.1)
IMM GRANULOCYTES NFR BLD: 0 %
LIPASE SERPL-CCNC: 186 U/L (ref 0–59)
LYMPHOCYTES # BLD AUTO: 1 X10E3/UL (ref 0.7–3.1)
LYMPHOCYTES NFR BLD AUTO: 21 %
MCH RBC QN AUTO: 34.8 PG (ref 26.6–33)
MCHC RBC AUTO-ENTMCNC: 32.6 G/DL (ref 31.5–35.7)
MCV RBC AUTO: 107 FL (ref 79–97)
MONOCYTES # BLD AUTO: 0.4 X10E3/UL (ref 0.1–0.9)
MONOCYTES NFR BLD AUTO: 9 %
NEUTROPHILS # BLD AUTO: 3 X10E3/UL (ref 1.4–7)
NEUTROPHILS NFR BLD AUTO: 68 %
PLATELET # BLD AUTO: 204 X10E3/UL (ref 150–379)
POTASSIUM SERPL-SCNC: 4.4 MMOL/L (ref 3.5–5.2)
PROT SERPL-MCNC: 6.3 G/DL (ref 6–8.5)
RBC # BLD AUTO: 2.56 X10E6/UL (ref 4.14–5.8)
SODIUM SERPL-SCNC: 139 MMOL/L (ref 134–144)
WBC # BLD AUTO: 4.5 X10E3/UL (ref 3.4–10.8)

## 2017-09-15 ENCOUNTER — TELEPHONE (OUTPATIENT)
Dept: ONCOLOGY | Age: 47
End: 2017-09-15

## 2017-09-16 ENCOUNTER — DOCUMENTATION ONLY (OUTPATIENT)
Dept: ONCOLOGY | Age: 47
End: 2017-09-16

## 2017-09-16 PROBLEM — M89.8X5 LYTIC BONE LESION OF HIP: Status: ACTIVE | Noted: 2017-09-16

## 2017-09-17 NOTE — PROGRESS NOTES
CBC , CMP reviewed     His wbc, platelets are now normal, however he is still anemic with Hb 8.9 g/dl with no clear etiology. Most of his liver numbers are also normal but ALT and AST remain elevated. He also had ascites previously- he will be referred to GI Dr. Jourdan Silverio for further evaluation.     I will see him in 3 weeks with repeat CBC as his anemia has not improved

## 2017-09-18 DIAGNOSIS — D61.818 PANCYTOPENIA (HCC): Primary | ICD-10-CM

## 2017-09-18 NOTE — PROGRESS NOTES
Call placed to patient, updated on note by provider, need for follow up in 3 weeks with repeat labs and referral to GI. Patient verbalized understanding and thanked for call. Will follow up with provider in 3 weeks with repeat CBC, requested lab slips be mailed to him at home.

## 2017-09-18 NOTE — TELEPHONE ENCOUNTER
See other encounter, patient contacted, HIPAA verified. To follow up with provider in 3 weeks with repeat CBC.

## 2017-09-21 DIAGNOSIS — D61.818 PANCYTOPENIA (HCC): ICD-10-CM

## 2017-09-22 ENCOUNTER — TELEPHONE (OUTPATIENT)
Dept: ONCOLOGY | Age: 47
End: 2017-09-22

## 2017-09-22 DIAGNOSIS — D61.818 PANCYTOPENIA (HCC): Primary | ICD-10-CM

## 2017-09-22 NOTE — TELEPHONE ENCOUNTER
Call placed to patient, message left to return call. Patient scheduled for GI appointment with Dr. Jia Castro on 9/25 at 3pm. Will need to call [phone] to pre-register.      Dr. Cooley Signs on January 2,2018 at 4pm.

## 2017-09-27 ENCOUNTER — TELEPHONE (OUTPATIENT)
Dept: ONCOLOGY | Age: 47
End: 2017-09-27

## 2017-09-27 NOTE — TELEPHONE ENCOUNTER
Received call from patient, HIPAA verified. Has not received lab slips placed in mail yet, requested lab slips be faxed to [facility] a [address].  Lab slips faxed complete to [facility].

## 2017-09-29 LAB
BASOPHILS # BLD AUTO: 0 X10E3/UL (ref 0–0.2)
BASOPHILS NFR BLD AUTO: 0 %
EOSINOPHIL # BLD AUTO: 0.2 X10E3/UL (ref 0–0.4)
EOSINOPHIL NFR BLD AUTO: 4 %
ERYTHROCYTE [DISTWIDTH] IN BLOOD BY AUTOMATED COUNT: 13 % (ref 12.3–15.4)
HCT VFR BLD AUTO: 27.6 % (ref 37.5–51)
HGB BLD-MCNC: 9.2 G/DL (ref 12.6–17.7)
IMM GRANULOCYTES # BLD: 0 X10E3/UL (ref 0–0.1)
IMM GRANULOCYTES NFR BLD: 0 %
LYMPHOCYTES # BLD AUTO: 1.4 X10E3/UL (ref 0.7–3.1)
LYMPHOCYTES NFR BLD AUTO: 27 %
MCH RBC QN AUTO: 33.7 PG (ref 26.6–33)
MCHC RBC AUTO-ENTMCNC: 33.3 G/DL (ref 31.5–35.7)
MCV RBC AUTO: 101 FL (ref 79–97)
MONOCYTES # BLD AUTO: 0.6 X10E3/UL (ref 0.1–0.9)
MONOCYTES NFR BLD AUTO: 11 %
NEUTROPHILS # BLD AUTO: 3 X10E3/UL (ref 1.4–7)
NEUTROPHILS NFR BLD AUTO: 58 %
PLATELET # BLD AUTO: 156 X10E3/UL (ref 150–379)
RBC # BLD AUTO: 2.73 X10E6/UL (ref 4.14–5.8)
WBC # BLD AUTO: 5.2 X10E3/UL (ref 3.4–10.8)

## 2017-10-11 ENCOUNTER — OFFICE VISIT (OUTPATIENT)
Dept: ONCOLOGY | Age: 47
End: 2017-10-11

## 2017-10-11 VITALS
RESPIRATION RATE: 20 BRPM | WEIGHT: 130.4 LBS | HEART RATE: 87 BPM | HEIGHT: 67 IN | SYSTOLIC BLOOD PRESSURE: 112 MMHG | OXYGEN SATURATION: 97 % | BODY MASS INDEX: 20.47 KG/M2 | TEMPERATURE: 98.4 F | DIASTOLIC BLOOD PRESSURE: 75 MMHG

## 2017-10-11 DIAGNOSIS — D61.818 PANCYTOPENIA (HCC): Primary | ICD-10-CM

## 2017-10-11 DIAGNOSIS — R79.89 ABNORMAL LFTS: ICD-10-CM

## 2017-10-11 DIAGNOSIS — M89.8X5 LYTIC BONE LESION OF HIP: ICD-10-CM

## 2017-10-11 DIAGNOSIS — D64.9 ANEMIA, UNSPECIFIED TYPE: ICD-10-CM

## 2017-10-11 RX ORDER — SODIUM, POTASSIUM,MAG SULFATES 17.5-3.13G
SOLUTION, RECONSTITUTED, ORAL ORAL
Refills: 0 | COMMUNITY
Start: 2017-09-26 | End: 2018-02-22 | Stop reason: ALTCHOICE

## 2017-10-11 RX ORDER — METRONIDAZOLE 500 MG/1
TABLET ORAL
Refills: 0 | COMMUNITY
Start: 2017-08-25 | End: 2018-02-22 | Stop reason: ALTCHOICE

## 2017-10-11 NOTE — PROGRESS NOTES
Hematology progress note    REASON FOR VISIT: Pancytopenia    HISTORY OF PRESENT ILLNESS: Mr. Tremaine Delacruz is a 56 y.o. male who presents for follow up of pancytopenia and abnormal LFTs    On 8/23/17 Patient presented to the Emergency Room with diarrhea x 2 weeks, without blood, nausea, fevers or sick contacts. He also reported a weight loss of 30 lb in 1 month with increasing fatigue. He was noted to be pancytopenic. He was found to be C diff positive and received Flagyl with improvement in symptoms. He was noted to have pancytopenia. He has pancytopenia going back to 2014 with most recent CBC on 8/25/17 showing a WBC count of 2600, ANC 1600, Hb 10.7 g/dl, , Platelets 95M. Of note, the thrombocytopenia is new since 8/22/17. Work up on 8/23/17 showed a normal B12 of 491, iron studies did not suggest a deficiency. Haptoglobin normal at 94, LFTs were abnormal, lipase was elevated. BM biopsy on 8/24/17 showed Mildly hypocellular marrow with trilineage hematopoiesis, no evidence of MDS, Myeloma or Lymphoma, FISH negative. Gammopathy evaluation, CT scan and bone scan negative other than a possible Rt pelvic lytic lesion and small ascites. Repeat CBC on 9/12/17 with WBC 4.5K, Hb 8.9 g/dl, , Plts 204K. ALT and AST were still elevated on 9/12/17. Follow up with GI Dr. Morena Sweeney was recommended and a repeat CBC was ordered      Comes with his wife. He is doing very well, back to work, quit drinking, no Bleeding though he bruises easy. Has no fevers, chills, NS, pain, lumps, falls. Has a itchy rash on his R flank. No other rashes. Appetite is great and weight is stable    EGD and Colonoscopy with Dr. Morena Sweeney were normal     History reviewed. No pertinent past medical history.     History reviewed.  No pertinent surgical history.       Allergies   Allergen Reactions    Aspirin Rash       Current Outpatient Prescriptions   Medication Sig Dispense Refill    B.infantis-B.ani-B.long-B.bifi (PROBIOTIC 4X) 10-15 mg TbEC Take  by mouth.  therapeutic multivitamin (THERA) tablet Take 1 Tab by mouth daily.  metroNIDAZOLE (FLAGYL) 500 mg tablet TK 1 T PO TID  0    SUPREP BOWEL PREP KIT 17.5-3.13-1.6 gram solr oral solution MIX AND DRINK UTD BEFORE COLONOSCOPY  0       Social History     Social History    Marital status:      Spouse name: N/A    Number of children: N/A    Years of education: N/A     Social History Main Topics    Smoking status: Current Every Day Smoker     Packs/day: 0.10    Smokeless tobacco: Never Used    Alcohol use Yes      Comment: every other day    Drug use: No    Sexual activity: Not Asked     Other Topics Concern    None     Social History Narrative       FH reviewed in HPI    ROS  A 12 point review of systems was obtained and is negative except as listed in HPI.   ECOG PS is 0      Physical Examination:   Visit Vitals    /75    Pulse 87    Temp 98.4 °F (36.9 °C)    Resp 20    Ht 5' 6.5\" (1.689 m)    Wt 130 lb 6.4 oz (59.1 kg)    SpO2 97%    BMI 20.73 kg/m2     General appearance - alert, well appearing, and in no distress  Mental status - oriented to person, place, and time  Mouth - mucous membranes moist, pharynx normal without lesions  Neck - supple, no significant adenopathy  Lymphatics - no palpable lymphadenopathy, no hepatosplenomegaly  Chest - clear to auscultation, no wheezes, rales or rhonchi, symmetric air entry  Heart - normal rate, regular rhythm, normal S1, S2, no murmurs, rubs, clicks or gallops  Abdomen - soft, nontender, nondistended, no masses or organomegaly, bowel sounds present  Back exam - full range of motion, no tenderness, palpable spasm or pain on motion  Neurological - normal speech, no focal findings or movement disorder noted  Musculoskeletal - no joint tenderness, deformity or swelling  Extremities - peripheral pulses normal, no pedal edema, no clubbing or cyanosis  Skin - normal coloration and turgor, no rashes, no suspicious skin lesions noted    LABS  Lab Results   Component Value Date/Time    WBC 5.2 09/28/2017 12:57 PM    HGB 9.2 09/28/2017 12:57 PM    HCT 27.6 09/28/2017 12:57 PM    PLATELET 674 73/31/3230 12:57 PM     09/28/2017 12:57 PM    ABS. NEUTROPHILS 3.0 09/28/2017 12:57 PM     Lab Results   Component Value Date/Time    Sodium 139 09/12/2017 12:00 AM    Potassium 4.4 09/12/2017 12:00 AM    Chloride 100 09/12/2017 12:00 AM    CO2 26 09/12/2017 12:00 AM    Glucose 90 09/12/2017 12:00 AM    BUN 10 09/12/2017 12:00 AM    Creatinine 1.08 09/12/2017 12:00 AM    GFR est AA 95 09/12/2017 12:00 AM    GFR est non-AA 82 09/12/2017 12:00 AM    Calcium 8.8 09/12/2017 12:00 AM     Lab Results   Component Value Date/Time    AST (SGOT) 75 09/12/2017 12:00 AM    Alk. phosphatase 98 09/12/2017 12:00 AM    Protein, total 6.3 09/12/2017 12:00 AM    Albumin 4.1 09/12/2017 12:00 AM    Globulin 3.3 08/25/2017 04:04 AM    A-G Ratio 1.9 09/12/2017 12:00 AM     Lab Results   Component Value Date/Time    Reticulocyte count 1.2 08/24/2017 05:55 AM    Iron % saturation 30 08/23/2017 04:19 AM    TIBC 175 08/23/2017 04:19 AM    Ferritin 1531 08/23/2017 04:19 AM    Vitamin B12 491 08/23/2017 04:19 AM    Folate 24.7 08/23/2017 04:19 AM    Haptoglobin 99 08/24/2017 05:55 AM     08/24/2017 05:55 AM    TSH 3.67 08/25/2017 04:15 AM    M-spike Not Observed 08/23/2017 02:47 PM    Lipase 186 09/12/2017 12:00 AM    Hep C  virus Ab Interp. NONREACTIVE 08/24/2017 05:55 AM     Lab Results   Component Value Date/Time    INR 1.4 08/24/2017 05:55 AM    aPTT 34.3 08/24/2017 05:55 AM    Fibrinogen 197 08/24/2017 05:55 AM     HIV non reactive    Normal SPEP , LAUREN, Light chain ratio    PATHOLOGY  BM biopsy 8/24/17  Bone marrow:   Mildly hypocellular marrow with trilineage hematopoiesis. See comment. Peripheral blood:   Pancytopenia. See CBC data.    Comment   The bone marrow is slightly hypocellular for age to reveal trilineage hematopoiesis with adequate maturation. Occasional atypical/dysplastic erythroid elements are seen, but they comprise <10% of the erythroid cell line. No significant dysplasia is identified in the myeloid and megakaryocytic lineages. Approximately 5% polytypic plasma cells are identified. No atypical lymphocytic infiltrates are observed. Clinical history indicates pancytopenia with lytic bone lesions of the pelvis, without M spike by SPEP and normal free light chain ratio. Overall morphologic findings are insufficient for a diagnosis of myelodysplastic syndrome. Correlation with clinical and cytogenetics/molecular findings is advised to rule out a low-grade myelodysplastic syndrome with minimal morphologic evidence of dysplasia. There is no evidence of plasma cell myeloma or lymphoma . FISH and Karyotype are normal    IMAGING  CT CAP 8/23/17  MPRESSION  IMPRESSION:  1. Abdominal and pelvic ascites. 2. Lytic bone lesions of the pelvis, suspicious for possible multiple myeloma.     Bone scan 8/23/17  IMPRESSION: Very vague increased tracer activity medial right iliac wing  corresponding to lytic lesion seen on the CT examination. Other lesions are not  well demonstrated by bone scan.       Records reviewed from Dr. Lizeth Acuna  Mr. Oliver Evans is a 56 y.o. male with pancytopenia, C diff diarrhea in Aug 2017 , abnormal LFTs, elevated lipase, ascites and a solitary pelvic bone lytic lesion. Most abnormalities have resolved apart from macrocytic anemia and mildly elevated transaminases    PLAN    Anemia  Macrocytic  Last Hb 9.2 g/dl  No b12 def, no hemolysis or iron def, no evidence of paraproteinemia, BM unremarkable apart from some erythroid dysplasia but not consistent with MDS    I expect continued improvement as I suspect that his hematologic abnormalities had resulted from alcohol abuse- resulting pancreatitis and hepatitis.  Alternatively- though marrow not definitive for MDS, if anemia fails to improve with time a repeat BM Bx may be needed    Will continue to follow    Abnormal LFTs  On 8/25/17 noted to have a Bb of 1.6, alb 3.1, AST 74  CT with no hepatosplenomegaly, did have mild ascites  Most abnormalities had resolved apart from mildly elevated AST and ALT  Has a follow up with Dr. Frank Pelaez    Lytic lesion    Solitary, negative bone scan (mild uptake only), do not believe this is metastatic    RTC 2 months with cbc, cmp and LDH    Jaspreet Holt MD    Mr. Sandra Mcrae has a reminder for a \"due or due soon\" health maintenance. I have asked that he contact his primary care provider for follow-up on this health maintenance.

## 2017-11-18 LAB
ALBUMIN SERPL-MCNC: 4.1 G/DL (ref 3.5–5.5)
ALBUMIN/GLOB SERPL: 1.4 {RATIO} (ref 1.2–2.2)
ALP SERPL-CCNC: 82 IU/L (ref 39–117)
ALT SERPL-CCNC: 23 IU/L (ref 0–44)
AST SERPL-CCNC: 30 IU/L (ref 0–40)
BASOPHILS # BLD AUTO: 0 X10E3/UL (ref 0–0.2)
BASOPHILS NFR BLD AUTO: 0 %
BILIRUB SERPL-MCNC: 0.7 MG/DL (ref 0–1.2)
BUN SERPL-MCNC: 16 MG/DL (ref 6–24)
BUN/CREAT SERPL: 14 (ref 9–20)
CALCIUM SERPL-MCNC: 9.1 MG/DL (ref 8.7–10.2)
CHLORIDE SERPL-SCNC: 103 MMOL/L (ref 96–106)
CO2 SERPL-SCNC: 25 MMOL/L (ref 18–29)
CREAT SERPL-MCNC: 1.16 MG/DL (ref 0.76–1.27)
EOSINOPHIL # BLD AUTO: 0.1 X10E3/UL (ref 0–0.4)
EOSINOPHIL NFR BLD AUTO: 2 %
ERYTHROCYTE [DISTWIDTH] IN BLOOD BY AUTOMATED COUNT: 13.5 % (ref 12.3–15.4)
GFR SERPLBLD CREATININE-BSD FMLA CKD-EPI: 75 ML/MIN/1.73
GFR SERPLBLD CREATININE-BSD FMLA CKD-EPI: 87 ML/MIN/1.73
GLOBULIN SER CALC-MCNC: 2.9 G/DL (ref 1.5–4.5)
GLUCOSE SERPL-MCNC: 75 MG/DL (ref 65–99)
HCT VFR BLD AUTO: 36.9 % (ref 37.5–51)
HGB BLD-MCNC: 12.1 G/DL (ref 12.6–17.7)
IMM GRANULOCYTES # BLD: 0 X10E3/UL (ref 0–0.1)
IMM GRANULOCYTES NFR BLD: 0 %
LDH SERPL-CCNC: 209 IU/L (ref 121–224)
LYMPHOCYTES # BLD AUTO: 1.5 X10E3/UL (ref 0.7–3.1)
LYMPHOCYTES NFR BLD AUTO: 38 %
MCH RBC QN AUTO: 32.4 PG (ref 26.6–33)
MCHC RBC AUTO-ENTMCNC: 32.8 G/DL (ref 31.5–35.7)
MCV RBC AUTO: 99 FL (ref 79–97)
MONOCYTES # BLD AUTO: 0.4 X10E3/UL (ref 0.1–0.9)
MONOCYTES NFR BLD AUTO: 9 %
NEUTROPHILS # BLD AUTO: 2.1 X10E3/UL (ref 1.4–7)
NEUTROPHILS NFR BLD AUTO: 51 %
PLATELET # BLD AUTO: 137 X10E3/UL (ref 150–379)
POTASSIUM SERPL-SCNC: 4.4 MMOL/L (ref 3.5–5.2)
PROT SERPL-MCNC: 7 G/DL (ref 6–8.5)
RBC # BLD AUTO: 3.74 X10E6/UL (ref 4.14–5.8)
SODIUM SERPL-SCNC: 142 MMOL/L (ref 134–144)
WBC # BLD AUTO: 4.1 X10E3/UL (ref 3.4–10.8)

## 2017-11-29 DIAGNOSIS — D61.818 PANCYTOPENIA (HCC): ICD-10-CM

## 2017-12-27 ENCOUNTER — OFFICE VISIT (OUTPATIENT)
Dept: ONCOLOGY | Age: 47
End: 2017-12-27

## 2017-12-27 VITALS
RESPIRATION RATE: 20 BRPM | DIASTOLIC BLOOD PRESSURE: 79 MMHG | BODY MASS INDEX: 22.29 KG/M2 | HEIGHT: 67 IN | SYSTOLIC BLOOD PRESSURE: 142 MMHG | HEART RATE: 70 BPM | TEMPERATURE: 98.7 F | WEIGHT: 142 LBS | OXYGEN SATURATION: 96 %

## 2017-12-27 DIAGNOSIS — D61.818 PANCYTOPENIA (HCC): Primary | ICD-10-CM

## 2017-12-27 DIAGNOSIS — R79.89 ABNORMAL LFTS: ICD-10-CM

## 2017-12-27 DIAGNOSIS — D64.9 ANEMIA, UNSPECIFIED TYPE: ICD-10-CM

## 2017-12-27 DIAGNOSIS — M89.8X5 LYTIC BONE LESION OF HIP: ICD-10-CM

## 2017-12-27 NOTE — PROGRESS NOTES
Hematology progress note    REASON FOR VISIT: Pancytopenia    HISTORY OF PRESENT ILLNESS: Mr. Lizette Will is a 56 y.o. male who presents for follow up of pancytopenia and abnormal LFTs    On 8/23/17 Patient presented to the Emergency Room with diarrhea x 2 weeks, without blood, nausea, fevers or sick contacts. He also reported a weight loss of 30 lb in 1 month with increasing fatigue. He was noted to be pancytopenic. He was found to be C diff positive and received Flagyl with improvement in symptoms. He was noted to have pancytopenia. He has pancytopenia going back to 2014 with most recent CBC on 8/25/17 showing a WBC count of 2600, ANC 1600, Hb 10.7 g/dl, , Platelets 55Y. Of note, the thrombocytopenia is new since 8/22/17. Work up on 8/23/17 showed a normal B12 of 491, iron studies did not suggest a deficiency. Haptoglobin normal at 94, LFTs were abnormal, lipase was elevated. BM biopsy on 8/24/17 showed Mildly hypocellular marrow with trilineage hematopoiesis, no evidence of MDS, Myeloma or Lymphoma, FISH negative. Gammopathy evaluation, CT scan and bone scan negative other than a possible Rt pelvic lytic lesion and small ascites. Repeat CBC on 9/12/17 with WBC 4.5K, Hb 8.9 g/dl, , Plts 204K. ALT and AST were still elevated on 9/12/17. He is to see hepatology this week. Has been doing really well. Has completely abstained from alcohol. No Bleeding . Has no fevers, chills, NS, pain, lumps, falls. No rashes. Appetite is great and weight is stable and so is weight. EGD and Colonoscopy with Dr. Celina Dean were normal     History reviewed. No pertinent past medical history.     History reviewed. No pertinent surgical history.       Allergies   Allergen Reactions    Aspirin Rash       Current Outpatient Prescriptions   Medication Sig Dispense Refill    B.infantis-B.ani-B.long-B.bifi (PROBIOTIC 4X) 10-15 mg TbEC Take  by mouth.       therapeutic multivitamin (THERA) tablet Take 1 Tab by mouth daily.      metroNIDAZOLE (FLAGYL) 500 mg tablet TK 1 T PO TID  0    SUPREP BOWEL PREP KIT 17.5-3.13-1.6 gram solr oral solution MIX AND DRINK UTD BEFORE COLONOSCOPY  0       Social History     Social History    Marital status:      Spouse name: N/A    Number of children: N/A    Years of education: N/A     Social History Main Topics    Smoking status: Current Every Day Smoker     Packs/day: 0.10    Smokeless tobacco: Never Used    Alcohol use Yes      Comment: every other day    Drug use: No    Sexual activity: Not Asked     Other Topics Concern    None     Social History Narrative       FH reviewed in HPI    ROS  A  review of systems was obtained and is negative except as listed in HPI. ECOG PS is 0      Physical Examination:   Visit Vitals    /79    Pulse 70    Temp 98.7 °F (37.1 °C)    Resp 20    Ht 5' 6.5\" (1.689 m)    Wt 142 lb (64.4 kg)    SpO2 96%    BMI 22.58 kg/m2     General appearance - alert, well appearing, and in no distress  Mental status - oriented to person, place, and time  Mouth - mucous membranes moist, pharynx normal without lesions  Neck - supple, no significant adenopathy  Lymphatics - no palpable lymphadenopathy, no hepatosplenomegaly  Chest - clear to auscultation, no wheezes, rales or rhonchi, symmetric air entry  Heart - normal rate, regular rhythm, normal S1, S2, no murmurs, rubs, clicks or gallops  Musculoskeletal - no joint tenderness, deformity or swelling  Extremities - peripheral pulses normal, no pedal edema, no clubbing or cyanosis  Skin - normal coloration and turgor, no rashes, no suspicious skin lesions noted    LABS  Lab Results   Component Value Date/Time    WBC 4.1 11/17/2017 01:08 PM    HGB 12.1 11/17/2017 01:08 PM    HCT 36.9 11/17/2017 01:08 PM    PLATELET 387 36/33/0436 01:08 PM    MCV 99 11/17/2017 01:08 PM    ABS.  NEUTROPHILS 2.1 11/17/2017 01:08 PM     Lab Results   Component Value Date/Time    Sodium 142 11/17/2017 01:08 PM Potassium 4.4 11/17/2017 01:08 PM    Chloride 103 11/17/2017 01:08 PM    CO2 25 11/17/2017 01:08 PM    Glucose 75 11/17/2017 01:08 PM    BUN 16 11/17/2017 01:08 PM    Creatinine 1.16 11/17/2017 01:08 PM    GFR est AA 87 11/17/2017 01:08 PM    GFR est non-AA 75 11/17/2017 01:08 PM    Calcium 9.1 11/17/2017 01:08 PM     Lab Results   Component Value Date/Time    AST (SGOT) 30 11/17/2017 01:08 PM    Alk. phosphatase 82 11/17/2017 01:08 PM    Protein, total 7.0 11/17/2017 01:08 PM    Albumin 4.1 11/17/2017 01:08 PM    Globulin 3.3 08/25/2017 04:04 AM    A-G Ratio 1.4 11/17/2017 01:08 PM     Lab Results   Component Value Date/Time    Reticulocyte count 1.2 08/24/2017 05:55 AM    Iron % saturation 30 08/23/2017 04:19 AM    TIBC 175 08/23/2017 04:19 AM    Ferritin 1531 08/23/2017 04:19 AM    Vitamin B12 491 08/23/2017 04:19 AM    Folate 24.7 08/23/2017 04:19 AM    Haptoglobin 99 08/24/2017 05:55 AM     11/17/2017 01:08 PM    TSH 3.67 08/25/2017 04:15 AM    M-spike Not Observed 08/23/2017 02:47 PM    Lipase 186 09/12/2017 12:00 AM    Hep C  virus Ab Interp. NONREACTIVE 08/24/2017 05:55 AM     Lab Results   Component Value Date/Time    INR 1.4 08/24/2017 05:55 AM    aPTT 34.3 08/24/2017 05:55 AM    Fibrinogen 197 08/24/2017 05:55 AM     HIV non reactive    Normal SPEP , LAUREN, Light chain ratio    PATHOLOGY  BM biopsy 8/24/17  Bone marrow:   Mildly hypocellular marrow with trilineage hematopoiesis. See comment. Peripheral blood:   Pancytopenia. See CBC data. Comment   The bone marrow is slightly hypocellular for age to reveal trilineage hematopoiesis with adequate maturation. Occasional atypical/dysplastic erythroid elements are seen, but they comprise <10% of the erythroid cell line. No significant dysplasia is identified in the myeloid and megakaryocytic lineages. Approximately 5% polytypic plasma cells are identified. No atypical lymphocytic infiltrates are observed.  Clinical history indicates pancytopenia with lytic bone lesions of the pelvis, without M spike by SPEP and normal free light chain ratio. Overall morphologic findings are insufficient for a diagnosis of myelodysplastic syndrome. Correlation with clinical and cytogenetics/molecular findings is advised to rule out a low-grade myelodysplastic syndrome with minimal morphologic evidence of dysplasia. There is no evidence of plasma cell myeloma or lymphoma . FISH and Karyotype are normal    IMAGING  CT CAP 8/23/17  MPRESSION  IMPRESSION:  1. Abdominal and pelvic ascites. 2. Lytic bone lesions of the pelvis, suspicious for possible multiple myeloma.     Bone scan 8/23/17  IMPRESSION: Very vague increased tracer activity medial right iliac wing  corresponding to lytic lesion seen on the CT examination. Other lesions are not  well demonstrated by bone scan.       Records reviewed from Dr. Jus Steve  Mr. Tremaine Delacruz is a 56 y.o. male with pancytopenia, C diff diarrhea in Aug 2017 , abnormal LFTs, elevated lipase, ascites and a solitary pelvic bone lytic lesion. Most abnormalities have resolved apart from macrocytic anemia and mildly elevated transaminases    Most all of his abnormalities have resolved as he has abstained from alcohol apart from mild thrombocytopenia. PLAN    Anemia  Macrocytic  Continues to improve  No b12 def, no hemolysis or iron def, no evidence of paraproteinemia, BM unremarkable apart from some erythroid dysplasia but not consistent with MDS    I expect continued improvement as I suspect that his hematologic abnormalities had resulted from alcohol abuse    Abnormal LFTs  On 8/25/17 noted to have a Bb of 1.6, alb 3.1, AST 74  CT with no hepatosplenomegaly, did have mild ascites  Most abnormalities have resolved off alcohol.   Has a follow up with Dr. Sam Sports this week    Lytic lesion    Solitary, negative bone scan (mild uptake only), do not believe this is metastatic    No further hematology follow up required at this time    Tejal Recinos MD    Mr. Leyla Lopez has a reminder for a \"due or due soon\" health maintenance. I have asked that he contact his primary care provider for follow-up on this health maintenance.

## 2017-12-29 ENCOUNTER — OFFICE VISIT (OUTPATIENT)
Dept: HEMATOLOGY | Age: 47
End: 2017-12-29

## 2017-12-29 VITALS
WEIGHT: 136.8 LBS | HEIGHT: 66 IN | SYSTOLIC BLOOD PRESSURE: 102 MMHG | OXYGEN SATURATION: 98 % | HEART RATE: 68 BPM | TEMPERATURE: 97.9 F | DIASTOLIC BLOOD PRESSURE: 71 MMHG | BODY MASS INDEX: 21.98 KG/M2

## 2017-12-29 DIAGNOSIS — K74.60 CIRRHOSIS OF LIVER WITHOUT ASCITES, UNSPECIFIED HEPATIC CIRRHOSIS TYPE (HCC): ICD-10-CM

## 2017-12-29 DIAGNOSIS — F10.10 ALCOHOL ABUSE: Primary | ICD-10-CM

## 2017-12-29 NOTE — PROGRESS NOTES
Chief Complaint   Patient presents with   [address] [facility] Patient     Elevated liver enzymes     Visit Vitals    /71 (BP 1 Location: Left arm, BP Patient Position: Sitting)    Pulse 68    Temp 97.9 °F (36.6 °C) (Tympanic)    Ht 5' 6\" (1.676 m)    Wt 136 lb 12.8 oz (62.1 kg)    SpO2 98%    BMI 22.08 kg/m2     PHQ over the last two weeks 12/29/2017   Little interest or pleasure in doing things Not at all   Feeling down, depressed or hopeless Not at all   Total Score PHQ 2 0

## 2017-12-29 NOTE — PROGRESS NOTES
[address] MD, [address], [facility], [place]       Jarocho Pierre, MIGUEL Suggs, [facility]   TAVON Mascorro NP        at [address], [address] [address]     [phone]     FAX: [phone]    at [address], [address] [facility], [address]     [phone]     FAX: [phone]       Patient Care Team:  None as PCP - Shashi Marroquin MD (Gastroenterology)      Problem List  Date Reviewed: 12/27/2017          Codes Class Noted    Elevated liver enzymes ICD-10-CM: R74.8  ICD-9-CM: 790.5  12/31/2017        Anemia ICD-10-CM: D64.9  ICD-9-CM: 285.9  10/11/2017        Lytic bone lesion of hip ICD-10-CM: M89.8X5  ICD-9-CM: 733.99  9/16/2017        Clostridium difficile diarrhea ICD-10-CM: A04.72  ICD-9-CM: 008.45  8/24/2017        Pancytopenia [facility]) ICD-10-CM: F15.324  ICD-9-CM: 284.19  8/24/2017              The physicians listed above have asked me to see Valeriy Rdz in consultation regarding management of cirrhosis that is likely secondary to alcohol. All medical records sent by the referring physicians were reviewed including imaging studies and pathology. Ferritin and iron panel was normal.     The patient is a 56 y.o. Black male who was first found to have chronic liver disease and cirrhosis in 2017 when she was hospitalized for sepsis. An assessment of liver fibrosis with biopsy or elastography has not been performed. The patient has not developed any major complications of cirrhosis to date. The patient has not developed ascites. The patient has not developed edema. The patient has not developed hepatic encephalopathy. The patient does not have esophageal or gastric varices.        The most recent laboratory studies indicate the liver transaminases are normal, ALP is normal, tests of hepatic synthetic and metabolic function are normal, total bilirubin is normal, albumin is normal and the platelet count is normal.    The patient has no symptoms which could be attributed to the liver disorder. The patient completes all daily activities without any functional limitations. The patient has not experienced fatigue, pain in the right side over the liver, yellowing of the eyes or skin, itching, problems concentrating, swelling of the abdomen or swelling of the lower extremities. ALLERGIES  Allergies   Allergen Reactions    Aspirin Rash       MEDICATIONS  Current Outpatient Prescriptions   Medication Sig    B.infantis-B.ani-B.long-B.bifi (PROBIOTIC 4X) 10-15 mg TbEC Take  by mouth.  therapeutic multivitamin (THERA) tablet Take 1 Tab by mouth daily.  metroNIDAZOLE (FLAGYL) 500 mg tablet TK 1 T PO TID    SUPREP BOWEL PREP KIT 17.5-3.13-1.6 gram solr oral solution MIX AND DRINK UTD BEFORE COLONOSCOPY     No current facility-administered medications for this visit. SYSTEM REVIEW NOT RELATED TO LIVER DISEASE OR REVIEWED ABOVE:  Constitution systems: Negative for fever, chills, weight gain, weight loss. Eyes: Negative for visual changes. ENT: Negative for sore throat, painful swallowing. Respiratory: Negative for cough, hemoptysis, SOB. Cardiology: Negative for chest pain, palpitations. GI:  Negative for constipation or diarrhea. : Negative for urinary frequency, dysuria, hematuria, nocturia. Skin: Negative for rash. Hematology: Negative for easy bruising, blood clots. Musculo-skeletal: Negative for back pain, muscle pain, weakness. Neurologic: Negative for headaches, dizziness, vertigo, memory problems not related to HE. Psychology: Negative for anxiety, depression. FAMILY HISTORY:  The father  of prostate cancer. The mother is alive and healthy. There is no family history of liver disease.       SOCIAL HISTORY:  The patient is . The patient has 2 children. The patient has never used tobacco products. The patient has previously consumed alcohol in excess. The patient has been abstinent from alcohol since 8/2017. The patient currently works full time for a construction company. PHYSICAL EXAMINATION:  Visit Vitals    /71 (BP 1 Location: Left arm, BP Patient Position: Sitting)    Pulse 68    Temp 97.9 °F (36.6 °C) (Tympanic)    Ht 5' 6\" (1.676 m)    Wt 136 lb 12.8 oz (62.1 kg)    SpO2 98%    BMI 22.08 kg/m2     General: No acute distress. Eyes: Sclera anicteric. ENT: No oral lesions. Thyroid normal.  Nodes: No adenopathy. Skin: No spider angiomata. No jaundice. No palmar erythema. Respiratory: Lungs clear to auscultation. Cardiovascular: Regular heart rate. No murmurs. No JVD. Abdomen: Soft non-tender. Liver size normal to percussion/palpation. Spleen not palpable. No obvious ascites. Extremities: No edema. No muscle wasting. No gross arthritic changes. Neurologic: Alert and oriented. Cranial nerves grossly intact. No asterixis.     LABORATORY STUDIES:  Liver [place] of [address] Units 11/17/2017   WBC 3.4 - 10.8 x10E3/uL 4.1   ANC 1.4 - 7.0 x10E3/uL 2.1   HGB 12.6 - 17.7 g/dL 12.1 (L)    - 379 x10E3/uL 137 (L)   INR 0.8 - 1.2    AST 0 - 40 IU/L 30   ALT 0 - 44 IU/L 23   Alk Phos 39 - 117 IU/L 82   Bili, Total 0.0 - 1.2 mg/dL 0.7   Bili, Direct 0.00 - 0.40 mg/dL    Albumin 3.5 - 5.5 g/dL 4.1   BUN 6 - 24 mg/dL 16   Creat 0.76 - 1.27 mg/dL 1.16   Na 134 - 144 mmol/L 142   K 3.5 - 5.2 mmol/L 4.4   Cl 96 - 106 mmol/L 103   CO2 18 - 29 mmol/L 25   Glucose 65 - 99 mg/dL 75   Magnesium 1.6 - 2.4 mg/dL      SEROLOGIES:  Serologies Latest Ref Rng & Units 12/29/2017 8/24/2017   Hep A Ab, Total Negative Negative    Hep B Surface Ag Index  0.23   Hep B Surface Ag Interp NEG    NEGATIVE   Hep B Core Ab, Total Negative Negative    Hep B Surface AB QL  Non Reactive    Hep C Ab 0.0 - 0.9 s/co ratio <0.1    Hep C Ab NR    NONREACTIVE   Ferritin 30 - 400 ng/mL 833 (H)    Iron % Saturation 15 - 55 % 22    ANSLEY Ab, Direct Negative Negative    Ceruloplasmin 16.0 - 31.0 mg/dL 20.2      Serologies Latest Ref Rng & Units 8/23/2017   Ferritin 30 - 400 ng/mL 1531 (H)   Iron % Saturation 15 - 55 % 30     LIVER HISTOLOGY:  Not available or performed    ENDOSCOPIC PROCEDURES:  Not available or performed    RADIOLOGY:  8/2017. CT scan abdomen with IV contrast.  Normal appearing liver. No liver mass lesions. Normal spleen. Moderate ascites. OTHER TESTING:  Not available or performed    ASSESSMENT AND PLAN:  Liver disease of unclear severity. The patient is thought to have cirrhosis because she had ascites during the previous hospitalization and has persistent thrombocytopenia. However, imaging of the liver does not suggest cirrhosis and all laboratory abnormalities are now all normal.      The etiology of the liver disease is most likely secondary to alcohol. She could have had alcoholic hepatitis with ascites during the previous hospitalization which has since resolved without developing cirrhosis. The need to perform an assessment of liver fibrosis was discussed with the patient. The Fibroscan can assess liver fibrosis and determine if a patient has advanced fibrosis or cirrhosis without the need for liver biopsy. The Fibroscan is currently available at liver [place]. This will be performed at the next office visit. Serologic testing for causes of chronic liver disease were ordered. All were negative. There is an elevation in ferritin with normal iron saturation. It is unlikely that the patient has hemochromatosis or is a carrier for an HFE gene. Will order HFE genetic testing. The elevation in ferritin is secondary to alcohol use and will gradually decline with abstinence.     Liver transaminases are normal.  Alkaline phosphatase is normal.  Liver function is normal. Total bilirubin is normal.  Serum albumin is normal.  The platelet count is depressed. Will perform laboratory testing to monitor liver function and degree of liver injury. This will include BMP, hepatic panel, CBC with platelet count and INR. The patient has normal liver function. The CTP is 5. Child class A. The MELD score is 8. The patient does not require liver transplant evaluation at this time. IF she remains abstinent from alcohol she will likely never require a liver transplant. Ascites has resolved without the need for developed. Lower extremity edema has not developed. Last upper endoscopy to assess for varices was performed in 10/2017. No varices were noted. If she is found to have cirrhosis by Fibroscan she should have a repeat EGD in 10/2019. Hepatic encephalopathy has not developed to date. There is no need for treatment with lactulose and/or Xifaxan at this time. There is no need to restrict dietary protein. The patient was directed to continue all current medications at the current dosages. There are no contraindications for the patient to take any medications that are necessary for treatment of other medical issues. The patient was advised to remain abstinent of all alcohol. Thrombocytopenia secondary to cirrhosis. There is no evidence of overt bleeding. Anemia of unclear etiology. The iron panel is not suggestive of FE deficiency. THis could also be from chronic bone marrow suppression from alcohol and should recover with time. The risk of osteoporosis is increased in patients with cirrhosis. DEXA bone density to assess for osteoporosis has not been performed. Vaccination for viral hepatitis A and B is recommended since the patient has no serologic evidence of previous exposure or vaccination with immunity. [facility] [address] screening has recently been performed and does not suggest [address]   The next liver imaging study will be performed in 2/2018. All of the above issues were discussed with the patient. All questions were answered. The patient expressed a clear understanding of the above. [address] [address] in 4 weeks for FibroScan, to review all data and determine the treatment plan.     Moris Jolley MD  Liver [place] [address], [facility].  [phone]

## 2017-12-30 LAB
ALBUMIN SERPL-MCNC: 4.4 G/DL (ref 3.5–5.5)
ALP SERPL-CCNC: 82 IU/L (ref 39–117)
ALT SERPL-CCNC: 25 IU/L (ref 0–44)
ANA SER QL: NEGATIVE
AST SERPL-CCNC: 32 IU/L (ref 0–40)
BILIRUB DIRECT SERPL-MCNC: 0.23 MG/DL (ref 0–0.4)
BILIRUB SERPL-MCNC: 0.8 MG/DL (ref 0–1.2)
BUN SERPL-MCNC: 20 MG/DL (ref 6–24)
BUN/CREAT SERPL: 16 (ref 9–20)
CALCIUM SERPL-MCNC: 9.4 MG/DL (ref 8.7–10.2)
CERULOPLASMIN SERPL-MCNC: 20.2 MG/DL (ref 16–31)
CHLORIDE SERPL-SCNC: 104 MMOL/L (ref 96–106)
CO2 SERPL-SCNC: 27 MMOL/L (ref 18–29)
CREAT SERPL-MCNC: 1.29 MG/DL (ref 0.76–1.27)
FERRITIN SERPL-MCNC: 833 NG/ML (ref 30–400)
GLUCOSE SERPL-MCNC: 77 MG/DL (ref 65–99)
HAV AB SER QL IA: NEGATIVE
HBV CORE AB SERPL QL IA: NEGATIVE
HBV SURFACE AB SER QL: NON REACTIVE
HCV AB S/CO SERPL IA: <0.1 S/CO RATIO (ref 0–0.9)
HCV AB SERPL QL IA: NORMAL
INR PPP: 1.1 (ref 0.8–1.2)
IRON SATN MFR SERPL: 22 % (ref 15–55)
IRON SERPL-MCNC: 62 UG/DL (ref 38–169)
LIPASE SERPL-CCNC: 82 U/L (ref 13–78)
POTASSIUM SERPL-SCNC: 4.1 MMOL/L (ref 3.5–5.2)
PROTHROMBIN TIME: 11.8 SEC (ref 9.1–12)
SODIUM SERPL-SCNC: 144 MMOL/L (ref 134–144)
TIBC SERPL-MCNC: 287 UG/DL (ref 250–450)
UIBC SERPL-MCNC: 225 UG/DL (ref 111–343)

## 2017-12-31 PROBLEM — E83.39 HYPOPHOSPHATEMIA: Status: RESOLVED | Noted: 2017-08-24 | Resolved: 2017-12-31

## 2017-12-31 PROBLEM — R74.8 ELEVATED LIPASE: Status: RESOLVED | Noted: 2017-08-24 | Resolved: 2017-12-31

## 2017-12-31 PROBLEM — E83.42 HYPOMAGNESEMIA: Status: RESOLVED | Noted: 2017-08-24 | Resolved: 2017-12-31

## 2017-12-31 PROBLEM — D69.6 THROMBOCYTOPENIA (HCC): Status: ACTIVE | Noted: 2017-08-24

## 2017-12-31 PROBLEM — A41.9 SEPSIS (HCC): Status: RESOLVED | Noted: 2017-08-24 | Resolved: 2017-12-31

## 2017-12-31 PROBLEM — E87.6 HYPOKALEMIA: Status: RESOLVED | Noted: 2017-08-24 | Resolved: 2017-12-31

## 2017-12-31 PROBLEM — R74.8 ELEVATED LIVER ENZYMES: Status: ACTIVE | Noted: 2017-12-31

## 2017-12-31 PROBLEM — R79.89 ABNORMAL LFTS: Status: RESOLVED | Noted: 2017-09-12 | Resolved: 2017-12-31

## 2018-01-01 LAB
ACTIN IGG SERPL-ACNC: 19 UNITS (ref 0–19)
MITOCHONDRIA M2 IGG SER-ACNC: 31 UNITS (ref 0–20)

## 2018-01-02 LAB
AFP L3 MFR SERPL: NORMAL % (ref 0–9.9)
AFP SERPL-MCNC: 2.5 NG/ML (ref 0–8)
ETHANOL BLD GC-MCNC: NEGATIVE %

## 2018-01-03 LAB
C-ANCA TITR SER IF: NORMAL TITER
MYELOPEROXIDASE AB SER IA-ACNC: <9 U/ML (ref 0–9)
P-ANCA ATYPICAL TITR SER IF: NORMAL TITER
P-ANCA TITR SER IF: NORMAL TITER
PROTEINASE3 AB SER IA-ACNC: <3.5 U/ML (ref 0–3.5)

## 2018-01-04 LAB
LAB DIRECTOR NAME PROVIDER: NORMAL
SERPINA1 GENE MUT ANL BLD/T: NORMAL
SERPINA1 GENE MUT TESTED BLD/T: NORMAL

## 2018-01-04 NOTE — PROGRESS NOTES
Called pt and informed of negative work up. Lipase still elevated but almost normal. LFTs normal. Encouraged to keep up sobriety.

## 2018-01-31 ENCOUNTER — OFFICE VISIT (OUTPATIENT)
Dept: HEMATOLOGY | Age: 48
End: 2018-01-31

## 2018-01-31 VITALS
DIASTOLIC BLOOD PRESSURE: 88 MMHG | SYSTOLIC BLOOD PRESSURE: 129 MMHG | BODY MASS INDEX: 22.37 KG/M2 | WEIGHT: 138.6 LBS | OXYGEN SATURATION: 100 % | HEART RATE: 77 BPM | TEMPERATURE: 98 F

## 2018-01-31 DIAGNOSIS — R74.8 ELEVATED LIVER ENZYMES: Primary | ICD-10-CM

## 2018-01-31 DIAGNOSIS — K70.30 ALCOHOLIC CIRRHOSIS OF LIVER WITHOUT ASCITES (HCC): ICD-10-CM

## 2018-01-31 NOTE — PROGRESS NOTES
[address] MD, Lilli Bean, [address], [place]       TAVON Mcmanus PA-C Aloysius Frisk, [facility]   TAVON Mobley NP        at [address], [address] [address]     [phone]     FAX: [phone]    at [address], [id number] [place] [address], [address]     [phone]     FAX: [phone]     Patient Care Team:  None as PCP - General  Paulie Connell MD (Gastroenterology)    Problem List  Date Reviewed: 12/31/2017          Codes Class Noted    Alcoholic cirrhosis of liver without ascites ([facility] ICD-10-CM: K70.30  ICD-9-CM: 571.2  1/31/2018        Elevated liver enzymes ICD-10-CM: R74.8  ICD-9-CM: 790.5  12/31/2017        Anemia ICD-10-CM: D64.9  ICD-9-CM: 285.9  10/11/2017        Lytic bone lesion of hip ICD-10-CM: M89.8X5  ICD-9-CM: 733.99  9/16/2017        Clostridium difficile diarrhea ICD-10-CM: A04.72  ICD-9-CM: 008.45  8/24/2017        Thrombocytopenia ([facility] ICD-10-CM: D69.6  ICD-9-CM: 287.5  8/24/2017            Neema Marquis [facility] for management of 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 The active problem list, all pertinent past medical history, medications, liver histology, endoscopic studies,   radiologic findings and laboratory findings related to the liver disorder were reviewed with the patient. The patient is a 53 y.o. Black male who was first found to have chronic liver disease and cirrhosis in 2017 when she was hospitalized for sepsis. An assessment of liver fibrosis with elastography will be performed this visit. The patient has not developed any major complications of cirrhosis to date. The patient does not have esophageal or gastric varices.        The most recent laboratory studies indicate the liver transaminases are normal, ALP is normal, tests of hepatic synthetic and metabolic function are normal, total bilirubin is normal, albumin is normal and the platelet count is normal.    The patient has no symptoms which could be attributed to the liver disorder. The patient completes all daily activities without any functional limitations. The patient has not experienced fatigue, pain in the right side over the liver, yellowing of the eyes or skin, itching, problems concentrating, swelling of the abdomen or swelling of the lower extremities. ALLERGIES  Allergies   Allergen Reactions    Aspirin Rash     MEDICATIONS  Current Outpatient Prescriptions   Medication Sig    B.infantis-B.ani-B.long-B.bifi (PROBIOTIC 4X) 10-15 mg TbEC Take  by mouth.  therapeutic multivitamin (THERA) tablet Take 1 Tab by mouth daily.  metroNIDAZOLE (FLAGYL) 500 mg tablet TK 1 T PO TID    SUPREP BOWEL PREP KIT 17.5-3.13-1.6 gram solr oral solution MIX AND DRINK UTD BEFORE COLONOSCOPY     No current facility-administered medications for this visit. SYSTEM REVIEW NOT RELATED TO LIVER DISEASE OR REVIEWED ABOVE:  Constitution systems: Negative for fever, chills, weight gain, weight loss. Eyes: Negative for visual changes. ENT: Negative for sore throat, painful swallowing. Respiratory: Negative for cough, hemoptysis, SOB. Cardiology: Negative for chest pain, palpitations. GI:  Negative for constipation or diarrhea. : Negative for urinary frequency, dysuria, hematuria, nocturia. Skin: Negative for rash. Hematology: Negative for easy bruising, blood clots. Musculo-skeletal: Negative for back pain, muscle pain, weakness. Neurologic: Negative for headaches, dizziness, vertigo, memory problems not related to HE. Psychology: Negative for anxiety, depression. FAMILY HISTORY:  The father  of prostate cancer. The mother is alive and healthy. There is no family history of liver disease. SOCIAL HISTORY:  The patient is . The patient has 2 children. The patient has never used tobacco products. The patient has previously consumed alcohol in excess. The patient has been abstinent from alcohol since 8/2017. The patient currently works full time for a construction company. PHYSICAL EXAMINATION:  Visit Vitals    /88 (BP 1 Location: Right arm, BP Patient Position: Sitting)    Pulse 77    Temp 98 °F (36.7 °C) (Tympanic)    Wt 138 lb 9.6 oz (62.9 kg)    SpO2 100%    BMI 22.37 kg/m2     General: No acute distress. Eyes: Sclera anicteric. ENT: No oral lesions. Nodes: No adenopathy. Skin: No spider angiomata. No jaundice. No palmar erythema. Respiratory: Lungs clear to auscultation. Cardiovascular: Regular heart rate. No murmurs. No JVD. Abdomen: Soft non-tender. Liver size normal to percussion/palpation. Spleen not palpable. No obvious ascites. Extremities: No edema. No muscle wasting. No gross arthritic changes. Neurologic: Alert and oriented. Cranial nerves grossly intact. No asterixis.     LABORATORY STUDIES:  Liver [place] of [address] [place] [address] Units 11/17/2017   WBC 3.4 - 10.8 x10E3/uL 4.1   ANC 1.4 - 7.0 x10E3/uL 2.1   HGB 12.6 - 17.7 g/dL 12.1 (L)    - 379 x10E3/uL 137 (L)   INR 0.8 - 1.2    AST 0 - 40 IU/L 30   ALT 0 - 44 IU/L 23   Alk Phos 39 - 117 IU/L 82   Bili, Total 0.0 - 1.2 mg/dL 0.7   Bili, Direct 0.00 - 0.40 mg/dL    Albumin 3.5 - 5.5 g/dL 4.1   BUN 6 - 24 mg/dL 16   Creat 0.76 - 1.27 mg/dL 1.16   Na 134 - 144 mmol/L 142   K 3.5 - 5.2 mmol/L 4.4   Cl 96 - 106 mmol/L 103   CO2 18 - 29 mmol/L 25   Glucose 65 - 99 mg/dL 75   Magnesium 1.6 - 2.4 mg/dL      SEROLOGIES:  Serologies Latest Ref Rng & Units 12/29/2017 8/24/2017   Hep A Ab, Total Negative Negative    Hep B Surface Ag Index  0.23   Hep B Surface Ag Interp NEG    NEGATIVE   Hep B Core Ab, Total Negative Negative    Hep B Surface AB QL  Non Reactive    Hep C Ab 0.0 - 0.9 s/co ratio <0.1    Hep C Ab NR NONREACTIVE   Ferritin 30 - 400 ng/mL 833 (H)    Iron % Saturation 15 - 55 % 22    ANSLEY Ab, Direct Negative Negative    Ceruloplasmin 16.0 - 31.0 mg/dL 20.2      Serologies Latest Ref Rng & Units 8/23/2017   Ferritin 30 - 400 ng/mL 1531 (H)   Iron % Saturation 15 - 55 % 30     LIVER HISTOLOGY:  1/2018. FibroScan performed at [address]. EkPa was 22.8. IQR/med 16%. The results suggested a fibrosis level of F4. CAP is 242, not consistent with fatty liver disease. ENDOSCOPIC PROCEDURES:  Reports of an EGD without varices. Need to obtain report. It is not in the system. 10/2017: Colonoscopy with Dr. Karla Rea. Normal.     RADIOLOGY:  8/2017. CT scan abdomen with IV contrast.  Normal appearing liver. No liver mass lesions. Normal spleen. Moderate ascites. OTHER TESTING:  Not available or performed    ASSESSMENT AND PLAN:  Liver disease of unclear severity. The patient is thought to have cirrhosis because he had ascites during the previous hospitalization and has persistent thrombocytopenia. However, imaging of the liver does not suggest cirrhosis and all laboratory abnormalities are now all normal.      The etiology of the liver disease is most likely secondary to alcohol. Serologic testing for causes of chronic liver disease were ordered. All were negative. There is an elevation in ferritin with normal iron saturation. It is unlikely that the patient has hemochromatosis or is a carrier for an HFE gene. The elevation in ferritin is secondary to alcohol use and will gradually decline with abstinence. Will recheck today and if remains elevated, consider HFE testing in the future. Liver transaminases are normal.  Alkaline phosphatase is normal.  Liver function is normal.  Total bilirubin is normal.  Serum albumin is normal.  The platelet count is depressed. Will perform laboratory testing to monitor liver function and degree of liver injury.  This will include BMP, hepatic panel, CBC with platelet count and INR. The patient has normal liver function. The CTP is 5. Child class A. The MELD score is 8. The patient does not require liver transplant evaluation at this time. If he remains abstinent from alcohol, he will likely never require a liver transplant. Ascites has resolved without the need for diuretics. Lower extremity edema has not developed. Last upper endoscopy to assess for varices was evidently performed in 10/2017. Need to obtain report to document no varices. If none found, next screening EGD will be in 10/2019. Hepatic encephalopathy has not developed to date. There is no need for treatment with lactulose and/or Xifaxan at this time. There is no need to restrict dietary protein. The patient was directed to continue all current medications at the current dosages. There are no contraindications for the patient to take any medications that are necessary for treatment of other medical issues. The patient was advised to continue to remain abstinent of all alcohol. Thrombocytopenia secondary to cirrhosis. There is no evidence of overt bleeding. Anemia of unclear etiology. The iron panel is not suggestive of FE deficiency. This could also be from chronic bone marrow suppression from alcohol and should recover with time. The risk of osteoporosis is increased in patients with cirrhosis. DEXA bone density to assess for osteoporosis has not been performed. Vaccination for viral hepatitis A and B is recommended since the patient has no serologic evidence of previous exposure or vaccination with immunity. [address] screening has recently been performed and does not suggest [address] The next liver imaging study will be performed in 2/2018. This was ordered today. AFP was ordered as well. All of the above issues were discussed with the patient and his wife. All questions were answered.   The patient expressed a clear understanding of the above. [facility] in 4 weeks to review all data and determine the treatment plan.     Alayna Back, [facility]  Liver [place] of [address] [facility] , [address] [address]  [address].  [phone]

## 2018-01-31 NOTE — PROGRESS NOTES
1. Have you been to the ER, urgent care clinic since your last visit? Hospitalized since your last visit? No    2. Have you seen or consulted any other health care providers outside of the [address] [place] [address] since your last visit? Include any pap smears or colon screening.  No   Chief Complaint   Patient presents with    Follow-up     fibroscan      Visit Vitals    /88 (BP 1 Location: Right arm, BP Patient Position: Sitting)    Pulse 77    Temp 98 °F (36.7 °C) (Tympanic)    Wt 138 lb 9.6 oz (62.9 kg)    SpO2 100%    BMI 22.37 kg/m2     PHQ over the last two weeks 1/31/2018   Little interest or pleasure in doing things Not at all   Feeling down, depressed or hopeless Not at all   Total Score PHQ 2 0     Learning Assessment 1/31/2018   PRIMARY LEARNER Patient   PRIMARY LANGUAGE ENGLISH   LEARNER PREFERENCE PRIMARY LISTENING   ANSWERED BY patient   RELATIONSHIP SELF

## 2018-01-31 NOTE — MR AVS SNAPSHOT
2700 [facility] Tate [id number] 1400 [address] [place] [address] 
[phone] Patient: Elio Rainey MRN: [id number] RDJ:[id number] Visit Information Date & Time Provider Department Dept. Phone Encounter #  
 1/31/2018  2:45 PM Lalo Zurita, 3687 [facility] 219 [id number] Follow-up Instructions Return in about 4 weeks (around 2/28/2018). Your Appointments 2/22/2018  3:45 PM  
Follow Up with Lalo Zurita, TAVON Rivera 75 (3651 [facility]) Appt Note: Follow up 200 [facility] [id number] [facility] [address]  
59 [facility] [place] [address] Upcoming Health Maintenance Date Due Pneumococcal 19-64 Highest Risk (1 of 3 - PCV13) 1/4/1990 DTaP/Tdap/Td series (1 - Tdap) 1/4/1992 Influenza Age 5 to Adult 8/1/2017 Allergies as of 1/31/2018  Review Complete On: 1/31/2018 By: Antelmo Baltazar Severity Noted Reaction Type Reactions Aspirin  10/28/2011    Rash Current Immunizations  Never Reviewed No immunizations on file. Not reviewed this visit You Were Diagnosed With   
  
 Codes Comments Elevated liver enzymes    -  Primary ICD-10-CM: R74.8 ICD-9-CM: 790.5 Alcoholic cirrhosis of liver without ascites (HCC)     ICD-10-CM: K70.30 ICD-9-CM: 571.2 Vitals BP Pulse Temp Weight(growth percentile) SpO2 BMI  
 129/88 (BP 1 Location: Right arm, BP Patient Position: Sitting) 77 98 °F (36.7 °C) (Tympanic) 138 lb 9.6 oz (62.9 kg) 100% 22.37 kg/m2 Smoking Status Former Smoker BMI and BSA Data Body Mass Index Body Surface Area  
 22.37 kg/m 2 1.71 m 2 Preferred Pharmacy Pharmacy Name Phone Angela Oden [facility] Glenn Rodriguez  [place] [phone] Your Updated Medication List  
  
   
 This list is accurate as of: 1/31/18  3:10 PM.  Always use your most recent med list.  
  
  
  
  
 metroNIDAZOLE 500 mg tablet Commonly known as:  FLAGYL TK 1 T PO TID PROBIOTIC 4X 10-15 mg Tbec Generic drug:  B.infantis-B.ani-B.long-B.bifi Take  by mouth. SUPREP BOWEL PREP KIT 17.5-3.13-1.6 gram Solr oral solution Generic drug:  sodium-potassium-mag sulfate MIX AND DRINK UTD BEFORE COLONOSCOPY THERA tablet Generic drug:  therapeutic multivitamin Take 1 Tab by mouth daily. We Performed the Following AFP WITH AFP-L3% [[id number] Custom] CBC W/O DIFF [58618 CPT(R)] FERRITIN [41559 CPT(R)] HEPATIC FUNCTION PANEL (6) [[id number] Custom] METABOLIC PANEL, BASIC [96469 CPT(R)] PROTHROMBIN TIME + INR [08062 CPT(R)] Follow-up Instructions Return in about 4 weeks (around 2/28/2018). To-Do List   
 02/15/2018 Imaging:  US ABD COMP \[facility]\"" & HEALTH SERVICES! Dear Ailyn Thorpe III: 
Thank you for requesting a Orate account. Our records indicate that you already have an active Orate account. You can access your account anytime at [email or web address] Did you know that you can access your hospital and ER discharge instructions at any time in Orate? You can also review all of your test results from your hospital stay or ER visit. Additional Information If you have questions, please visit the Frequently Asked Questions section of the Orate website at [email or web address] Remember, Orate is NOT to be used for urgent needs. For medical emergencies, dial 911. Now available from your iPhone and Android! Please provide this summary of care documentation to your next provider. Your primary care clinician is listed as NONE. If you have any questions after today's visit, please call [phone].

## 2018-02-01 LAB
ALBUMIN SERPL-MCNC: 4.5 G/DL (ref 3.5–5.5)
ALP SERPL-CCNC: 89 IU/L (ref 39–117)
ALT SERPL-CCNC: 21 IU/L (ref 0–44)
AST SERPL-CCNC: 31 IU/L (ref 0–40)
BILIRUB DIRECT SERPL-MCNC: 0.25 MG/DL (ref 0–0.4)
BILIRUB SERPL-MCNC: 0.8 MG/DL (ref 0–1.2)
BUN SERPL-MCNC: 15 MG/DL (ref 6–24)
BUN/CREAT SERPL: 13 (ref 9–20)
CALCIUM SERPL-MCNC: 9.3 MG/DL (ref 8.7–10.2)
CHLORIDE SERPL-SCNC: 101 MMOL/L (ref 96–106)
CO2 SERPL-SCNC: 26 MMOL/L (ref 18–29)
CREAT SERPL-MCNC: 1.17 MG/DL (ref 0.76–1.27)
ERYTHROCYTE [DISTWIDTH] IN BLOOD BY AUTOMATED COUNT: 13 % (ref 12.3–15.4)
FERRITIN SERPL-MCNC: 781 NG/ML (ref 30–400)
GFR SERPLBLD CREATININE-BSD FMLA CKD-EPI: 74 ML/MIN/1.73
GFR SERPLBLD CREATININE-BSD FMLA CKD-EPI: 85 ML/MIN/1.73
GLUCOSE SERPL-MCNC: 81 MG/DL (ref 65–99)
HCT VFR BLD AUTO: 37.7 % (ref 37.5–51)
HGB BLD-MCNC: 12.9 G/DL (ref 13–17.7)
INR PPP: 1.1 (ref 0.8–1.2)
MCH RBC QN AUTO: 32.2 PG (ref 26.6–33)
MCHC RBC AUTO-ENTMCNC: 34.2 G/DL (ref 31.5–35.7)
MCV RBC AUTO: 94 FL (ref 79–97)
PLATELET # BLD AUTO: 145 X10E3/UL (ref 150–379)
POTASSIUM SERPL-SCNC: 4.5 MMOL/L (ref 3.5–5.2)
PROTHROMBIN TIME: 12 SEC (ref 9.1–12)
RBC # BLD AUTO: 4.01 X10E6/UL (ref 4.14–5.8)
SODIUM SERPL-SCNC: 142 MMOL/L (ref 134–144)
WBC # BLD AUTO: 4.8 X10E3/UL (ref 3.4–10.8)

## 2018-02-14 ENCOUNTER — HOSPITAL ENCOUNTER (OUTPATIENT)
Dept: ULTRASOUND IMAGING | Age: 48
Discharge: HOME | End: 2018-02-14
Attending: NURSE PRACTITIONER
Payer: COMMERCIAL

## 2018-02-14 DIAGNOSIS — K70.30 ALCOHOLIC CIRRHOSIS OF LIVER WITHOUT ASCITES (HCC): ICD-10-CM

## 2018-02-14 PROCEDURE — 76700 US EXAM ABDOM COMPLETE: CPT

## 2018-02-22 ENCOUNTER — OFFICE VISIT (OUTPATIENT)
Dept: HEMATOLOGY | Age: 48
End: 2018-02-22

## 2018-02-22 VITALS
SYSTOLIC BLOOD PRESSURE: 109 MMHG | OXYGEN SATURATION: 100 % | DIASTOLIC BLOOD PRESSURE: 76 MMHG | WEIGHT: 142 LBS | BODY MASS INDEX: 22.92 KG/M2 | HEART RATE: 78 BPM | TEMPERATURE: 97.5 F

## 2018-02-22 DIAGNOSIS — K70.30 ALCOHOLIC CIRRHOSIS OF LIVER WITHOUT ASCITES (HCC): Primary | ICD-10-CM

## 2018-02-22 NOTE — PROGRESS NOTES
1. Have you been to the ER, urgent care clinic since your last visit? Hospitalized since your last visit? No    2. Have you seen or consulted any other health care providers outside of the [facility] since your last visit? Include any pap smears or colon screening.  No   Chief Complaint   Patient presents with    Follow-up     Visit Vitals    /76 (BP 1 Location: Left arm, BP Patient Position: Sitting)    Pulse 78    Temp 97.5 °F (36.4 °C) (Tympanic)    Wt 142 lb (64.4 kg)    SpO2 100%    BMI 22.92 kg/m2     PHQ over the last two weeks 2/22/2018   Little interest or pleasure in doing things Not at all   Feeling down, depressed or hopeless Not at all   Total Score PHQ 2 0     Learning Assessment 1/31/2018   PRIMARY LEARNER Patient   PRIMARY LANGUAGE ENGLISH   LEARNER PREFERENCE PRIMARY LISTENING   ANSWERED BY patient   RELATIONSHIP SELF

## 2018-02-22 NOTE — PROGRESS NOTES
[address] Rebound MD Saw, [address], [facility], [place]       TAVON Day PA-C Silva Andrew, [facility]-BC   TAVON Means NP        at [address], [address] [address]     [phone]     FAX: [phone]    at [address], [address]     [phone]     FAX: [phone]     Patient Care Team:  None as PCP - General  Deedra Kehr, MD (Gastroenterology)    Problem List  Date Reviewed: 2/22/2018          Codes Class Noted    Alcoholic cirrhosis of liver without ascites ([facility].) ICD-10-CM: K70.30  ICD-9-CM: 571.2  1/31/2018        Elevated liver enzymes ICD-10-CM: R74.8  ICD-9-CM: 790.5  12/31/2017        Anemia ICD-10-CM: D64.9  ICD-9-CM: 285.9  10/11/2017        Lytic bone lesion of hip ICD-10-CM: M89.8X5  ICD-9-CM: 733.99  9/16/2017        Clostridium difficile diarrhea ICD-10-CM: A04.72  ICD-9-CM: 008.45  8/24/2017        Thrombocytopenia ([facility] 75.) ICD-10-CM: D69.6  ICD-9-CM: 287.5  8/24/2017            Emerson Low III \"Rajinder\" [facility] for management of 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 The active problem list, all pertinent past medical history, medications, liver histology, endoscopic studies,   radiologic findings and laboratory findings related to the liver disorder were reviewed with the patient. The patient is a 53 y.o. Black male who was first found to have chronic liver disease and cirrhosis in 2017 when he was hospitalized for sepsis. An assessment of liver fibrosis with elastography demonstrated cirrhosis. The patient has not developed any major complications of cirrhosis to date. The patient does not have esophageal or gastric varices. Report from EGD 10/2017 by Mehreen Becker. No documentation of varices on report.      The most recent laboratory studies indicate the liver transaminases are normal, ALP is normal, tests of hepatic synthetic and metabolic function are normal, total bilirubin is normal, albumin is normal and the platelet count is normal.    The patient has no symptoms which could be attributed to the liver disorder. The patient completes all daily activities without any functional limitations. The patient has not experienced fatigue, pain in the right side over the liver, yellowing of the eyes or skin, itching, problems concentrating, swelling of the abdomen or swelling of the lower extremities. ALLERGIES  Allergies   Allergen Reactions    Aspirin Rash     MEDICATIONS  Current Outpatient Prescriptions   Medication Sig    B.infantis-B.ani-B.long-B.bifi (PROBIOTIC 4X) 10-15 mg TbEC Take  by mouth.  therapeutic multivitamin (THERA) tablet Take 1 Tab by mouth daily. No current facility-administered medications for this visit. SYSTEM REVIEW NOT RELATED TO LIVER DISEASE OR REVIEWED ABOVE:  Constitution systems: Negative for fever, chills, weight gain, weight loss. Eyes: Negative for visual changes. ENT: Negative for sore throat, painful swallowing. Respiratory: Negative for cough, hemoptysis, SOB. Cardiology: Negative for chest pain, palpitations. GI:  Negative for constipation or diarrhea. : Negative for urinary frequency, dysuria, hematuria, nocturia. Skin: Negative for rash. Hematology: Negative for easy bruising, blood clots. Musculo-skeletal: Negative for back pain, muscle pain, weakness. Neurologic: Negative for headaches, dizziness, vertigo, memory problems not related to HE. Psychology: Negative for anxiety, depression. FAMILY HISTORY:  The father  of prostate cancer. The mother is alive and healthy. There is no family history of liver disease. SOCIAL HISTORY:  The patient is . The patient has 2 children.     The patient has never used tobacco products. The patient has previously consumed alcohol in excess. The patient has been abstinent from alcohol since 8/2017. The patient currently works full time for a construction company. PHYSICAL EXAMINATION:  Visit Vitals    /76 (BP 1 Location: Left arm, BP Patient Position: Sitting)    Pulse 78    Temp 97.5 °F (36.4 °C) (Tympanic)    Wt 142 lb (64.4 kg)    SpO2 100%    BMI 22.92 kg/m2     General: No acute distress. Eyes: Sclera anicteric. ENT: No oral lesions. Nodes: No adenopathy. Skin: No spider angiomata. No jaundice. No palmar erythema. Respiratory: Lungs clear to auscultation. Cardiovascular: Regular heart rate. No murmurs. No JVD. Abdomen: Soft non-tender. Liver size normal to percussion/palpation. Spleen not palpable. No obvious ascites. Extremities: No edema. No muscle wasting. No gross arthritic changes. Neurologic: Alert and oriented. Cranial nerves grossly intact. No asterixis.     LABORATORY STUDIES:  [address] Units 1/31/2018 12/29/2017   WBC 3.4 - 10.8 x10E3/uL 4.8    ANC 1.4 - 7.0 x10E3/uL     HGB 13.0 - 17.7 g/dL 12.9 (L)     - 379 x10E3/uL 145 (L)    INR 0.8 - 1.2 1.1 1.1   AST 0 - 40 IU/L 31 32   ALT 0 - 44 IU/L 21 25   Alk Phos 39 - 117 IU/L 89 82   Bili, Total 0.0 - 1.2 mg/dL 0.8 0.8   Bili, Direct 0.00 - 0.40 mg/dL 0.25 0.23   Albumin 3.5 - 5.5 g/dL 4.5 4.4   BUN 6 - 24 mg/dL 15 20   Creat 0.76 - 1.27 mg/dL 1.17 1.29 (H)   Na 134 - 144 mmol/L 142 144   K 3.5 - 5.2 mmol/L 4.5 4.1   Cl 96 - 106 mmol/L 101 104   CO2 18 - 29 mmol/L 26 27   Glucose 65 - 99 mg/dL 81 77   Magnesium 1.6 - 2.4 mg/dL       Liver [facility] Latest Ref Rng & Units 11/17/2017   WBC 3.4 - 10.8 x10E3/uL 4.1   ANC 1.4 - 7.0 x10E3/uL 2.1   HGB 13.0 - 17.7 g/dL 12.1 (L)    - 379 x10E3/uL 137 (L)   INR 0.8 - 1.2    AST 0 - 40 IU/L 30   ALT 0 - 44 IU/L 23   Alk Phos 39 - 117 IU/L 82   Bili, Total 0.0 - 1.2 mg/dL 0.7   Bili, Direct 0.00 - 0.40 mg/dL    Albumin 3.5 - 5.5 g/dL 4.1   BUN 6 - 24 mg/dL 16   Creat 0.76 - 1.27 mg/dL 1.16   Na 134 - 144 mmol/L 142   K 3.5 - 5.2 mmol/L 4.4   Cl 96 - 106 mmol/L 103   CO2 18 - 29 mmol/L 25   Glucose 65 - 99 mg/dL 75   Magnesium 1.6 - 2.4 mg/dL      SEROLOGIES:  Serologies Latest Ref Rng & Units 12/29/2017 8/24/2017   Hep A Ab, Total Negative Negative    Hep B Surface Ag Index  0.23   Hep B Surface Ag Interp NEG    NEGATIVE   Hep B Core Ab, Total Negative Negative    Hep B Surface AB QL  Non Reactive    Hep C Ab 0.0 - 0.9 s/co ratio <0.1    Hep C Ab NR    NONREACTIVE   Ferritin 30 - 400 ng/mL 833 (H)    Iron % Saturation 15 - 55 % 22    ANSLEY Ab, Direct Negative Negative    Ceruloplasmin 16.0 - 31.0 mg/dL 20.2      Serologies Latest Ref Rng & Units 8/23/2017   Ferritin 30 - 400 ng/mL 1531 (H)   Iron % Saturation 15 - 55 % 30     LIVER HISTOLOGY:  1/2018. FibroScan performed at [address] EkPa was 22.8. IQR/med 16%. The results suggested a fibrosis level of F4. CAP is 242, not consistent with fatty liver disease. ENDOSCOPIC PROCEDURES:  10/2017. Reports of an EGD without varices. The patient brought in discharge paperwork which includes physician findings. No mention of varices. 10/2017: Colonoscopy with Dr. Tito James. Normal.     RADIOLOGY:  8/2017. CT scan abdomen with IV contrast.  Normal appearing liver. No liver mass lesions. Normal spleen. Moderate ascites. OTHER TESTING:  Not available or performed    ASSESSMENT AND PLAN:  Liver disease with cirrhosis. The etiology of the liver disease is most likely secondary to alcohol. Serologic testing for causes of chronic liver disease were ordered. All were negative. There is an elevation in ferritin with normal iron saturation. It is unlikely the patient has hemochromatosis or is a carrier for an HFE gene. The elevation in ferritin is secondary to alcohol use and is gradually declining with abstinence. Will recheck today and if elevated over previous values will consider HFE testing in the future. Liver transaminases are normal.  Alkaline phosphatase is normal.  Liver function is normal.  Total bilirubin is normal.  Serum albumin is normal.  The platelet count is depressed. Will perform laboratory testing to monitor liver function and degree of liver injury. This will include BMP, hepatic panel, CBC with platelet count and INR. The patient has normal liver function. The CTP is 5. Child class A. The MELD score is 8. The patient does not require liver transplant evaluation at this time. If he remains abstinent from alcohol, he will likely never require a liver transplant. Ascites has resolved without the need for diuretics. Lower extremity edema has not developed. Last upper endoscopy to assess for varices was performed in 10/2017. Next screening EGD will be in 10/2019. Hepatic encephalopathy has not developed to date. There is no need for treatment with lactulose and/or Xifaxan at this time. There is no need to restrict dietary protein. The patient was directed to continue all current medications at the current dosages. There are no contraindications for the patient to take any medications that are necessary for treatment of other medical issues. The patient was advised to continue to remain abstinent of all alcohol. Thrombocytopenia secondary to cirrhosis. There is no evidence of overt bleeding. Anemia of unclear etiology. The iron panel is not suggestive of FE deficiency. This could also be from chronic bone marrow suppression from alcohol and is recovering with time. The risk of osteoporosis is increased in patients with cirrhosis. DEXA bone density to assess for osteoporosis has not been performed.       Vaccination for viral hepatitis A and B is recommended since the patient has no serologic evidence of previous exposure or vaccination with immunity. [facility]. screening has recently been performed and does not suggest [facility].. The next liver imaging study will be performed in 8/2018. AFP is due next in June 2018. I will order them closer to the dates. All of the above issues were discussed with the patient. All questions were answered. The patient expressed a clear understanding of the above. [facility] 87 in 4 weeks to review all data and determine the treatment plan. Then may stretch out to every 2 months since will have achieved over 6 months sobriety.     Charley Mendez, CAROLINE-BC  Liver [facility] [address] [place] [address] [address] [facility], [address] [address]  22.  [phone]

## 2018-02-22 NOTE — PROGRESS NOTES
Abuse Screening Questionnaire 2/22/2018   Do you ever feel afraid of your partner? N   Are you in a relationship with someone who physically or mentally threatens you? N   Is it safe for you to go home?  Darron Jenkins

## 2018-02-22 NOTE — MR AVS SNAPSHOT
1111 [facility] [id number] [facility] 
[phone] Patient: Earl Mendez 
MRN: [id number]:3/[id number] Visit Information Date & Time Provider Department Dept. Phone Encounter #  
 2/22/2018  3:45 PM Moriah Stringer, 3687 [facility] 219 [id number] Your Appointments 3/22/2018  2:30 PM  
PROCEDURE with MD Nicole Zendejas 75 [address] [place] [address] Road) Appt Note: EGD  
 200 [facility] [id number] Steve Gonzalez 13  
[phone]  
  
   
 200 [facility] 505 [facility] [address]  
  
    
 3/26/2018 12:15 PM  
Follow Up with TAVON Sanchez 75 ([address]) Appt Note: Follow up 200 [facility] [id number] [facility] [address]  
59 [facility] [address] Upcoming Health Maintenance Date Due Pneumococcal 19-64 Highest Risk (1 of 3 - PCV13) 1/4/1990 DTaP/Tdap/Td series (1 - Tdap) 1/4/1992 Influenza Age 5 to Adult 8/1/2017 Allergies as of 2/22/2018  Review Complete On: 2/22/2018 By: Roby Combs Severity Noted Reaction Type Reactions Aspirin  10/28/2011    Rash Current Immunizations  Never Reviewed No immunizations on file. Not reviewed this visit You Were Diagnosed With   
  
 Codes Comments Alcoholic cirrhosis of liver without ascites ([facility]    -  Primary ICD-10-CM: K70.30 ICD-9-CM: 571.2 Vitals BP Pulse Temp Weight(growth percentile) SpO2 BMI  
 109/76 (BP 1 Location: Left arm, BP Patient Position: Sitting) 78 97.5 °F (36.4 °C) (Tympanic) 142 lb (64.4 kg) 100% 22.92 kg/m2 Smoking Status Former Smoker BMI and BSA Data Body Mass Index Body Surface Area  
 22.92 kg/m 2 1.73 m 2 Preferred Pharmacy Pharmacy Name Phone  32[address] [facility],[address], [address] [facility] [place] AT [address] Pope [phone] Your Updated Medication List  
  
   
This list is accurate as of 2/22/18  3:55 PM.  Always use your most recent med list.  
  
  
  
  
 metroNIDAZOLE 500 mg tablet Commonly known as:  FLAGYL TK 1 T PO TID PROBIOTIC 4X 10-15 mg Tbec Generic drug:  B.infantis-B.ani-B.long-B.bifi Take  by mouth. SUPREP BOWEL PREP KIT 17.5-3.13-1.6 gram Solr oral solution Generic drug:  sodium-potassium-mag sulfate MIX AND DRINK UTD BEFORE COLONOSCOPY THERA tablet Generic drug:  therapeutic multivitamin Take 1 Tab by mouth daily. We Performed the Following CBC W/O DIFF [37687 CPT(R)] FERRITIN [54927 CPT(R)] HEPATIC FUNCTION PANEL (6) [[id number] Custom] METABOLIC PANEL, BASIC [88898 CPT(R)] PROTHROMBIN TIME + INR [19698 CPT(R)] Introducing [facility] SERVICES! Dear Magnus Acuña III: 
Thank you for requesting a Dubizzle account. Our records indicate that you already have an active Dubizzle account. You can access your account anytime at [email or web address] Did you know that you can access your hospital and ER discharge instructions at any time in Dubizzle? You can also review all of your test results from your hospital stay or ER visit. Additional Information If you have questions, please visit the Frequently Asked Questions section of the Dubizzle website at [email or web address] Remember, Dubizzle is NOT to be used for urgent needs. For medical emergencies, dial 911. Now available from your iPhone and Android! Please provide this summary of care documentation to your next provider. Your primary care clinician is listed as NONE. If you have any questions after today's visit, please call [phone].

## 2018-02-23 LAB
ALBUMIN SERPL-MCNC: 4.3 G/DL (ref 3.5–5.5)
ALP SERPL-CCNC: 93 IU/L (ref 39–117)
ALT SERPL-CCNC: 20 IU/L (ref 0–44)
AST SERPL-CCNC: 31 IU/L (ref 0–40)
BILIRUB DIRECT SERPL-MCNC: 0.27 MG/DL (ref 0–0.4)
BILIRUB SERPL-MCNC: 0.8 MG/DL (ref 0–1.2)
BUN SERPL-MCNC: 14 MG/DL (ref 6–24)
BUN/CREAT SERPL: 11 (ref 9–20)
CALCIUM SERPL-MCNC: 9.1 MG/DL (ref 8.7–10.2)
CHLORIDE SERPL-SCNC: 100 MMOL/L (ref 96–106)
CO2 SERPL-SCNC: 25 MMOL/L (ref 18–29)
CREAT SERPL-MCNC: 1.22 MG/DL (ref 0.76–1.27)
ERYTHROCYTE [DISTWIDTH] IN BLOOD BY AUTOMATED COUNT: 13.1 % (ref 12.3–15.4)
FERRITIN SERPL-MCNC: 659 NG/ML (ref 30–400)
GFR SERPLBLD CREATININE-BSD FMLA CKD-EPI: 70 ML/MIN/{1.73_M2}
GFR SERPLBLD CREATININE-BSD FMLA CKD-EPI: 81 ML/MIN/{1.73_M2}
GLUCOSE SERPL-MCNC: 79 MG/DL (ref 65–99)
HCT VFR BLD AUTO: 37.6 % (ref 37.5–51)
HGB BLD-MCNC: 13.1 G/DL (ref 13–17.7)
INR PPP: 1.1 (ref 0.8–1.2)
MCH RBC QN AUTO: 33.2 PG (ref 26.6–33)
MCHC RBC AUTO-ENTMCNC: 34.8 G/DL (ref 31.5–35.7)
MCV RBC AUTO: 95 FL (ref 79–97)
PLATELET # BLD AUTO: 148 X10E3/UL (ref 150–379)
POTASSIUM SERPL-SCNC: 4 MMOL/L (ref 3.5–5.2)
PROTHROMBIN TIME: 11.5 SEC (ref 9.1–12)
RBC # BLD AUTO: 3.94 X10E6/UL (ref 4.14–5.8)
SODIUM SERPL-SCNC: 140 MMOL/L (ref 134–144)
WBC # BLD AUTO: 4.8 X10E3/UL (ref 3.4–10.8)

## 2018-02-23 NOTE — PROGRESS NOTES
Improved liver function with sobriety. Ferritin continues to go down. LFTs normal. Platelets stable.

## 2018-03-08 ENCOUNTER — DOCUMENTATION ONLY (OUTPATIENT)
Dept: HEMATOLOGY | Age: 48
End: 2018-03-08

## 2018-03-26 ENCOUNTER — OFFICE VISIT (OUTPATIENT)
Dept: HEMATOLOGY | Age: 48
End: 2018-03-26

## 2018-03-26 VITALS
HEART RATE: 87 BPM | BODY MASS INDEX: 22.63 KG/M2 | HEIGHT: 66 IN | DIASTOLIC BLOOD PRESSURE: 76 MMHG | WEIGHT: 140.8 LBS | SYSTOLIC BLOOD PRESSURE: 106 MMHG | TEMPERATURE: 97.9 F | OXYGEN SATURATION: 99 %

## 2018-03-26 DIAGNOSIS — K70.30 ALCOHOLIC CIRRHOSIS OF LIVER WITHOUT ASCITES (HCC): Primary | ICD-10-CM

## 2018-03-26 NOTE — PROGRESS NOTES
[address] MD, [address], [place], [place]       TAVON Winchester PA-C Jennifer Schiff, [facility]   TAVON Burkett NP        at [address], [address] [facility], [address]     [phone]     FAX: [phone]    at [address], [address]     [phone]     FAX: [phone]     Patient Care Team:  None as PCP - General  Karlee Bland MD (Gastroenterology)    Problem List  Date Reviewed: 2/22/2018          Codes Class Noted    Alcoholic cirrhosis of liver without ascites ([facility] ICD-10-CM: K70.30  ICD-9-CM: 571.2  1/31/2018        Elevated liver enzymes ICD-10-CM: R74.8  ICD-9-CM: 790.5  12/31/2017        Anemia ICD-10-CM: D64.9  ICD-9-CM: 285.9  10/11/2017        Lytic bone lesion of hip ICD-10-CM: M89.8X5  ICD-9-CM: 733.99  9/16/2017        Clostridium difficile diarrhea ICD-10-CM: A04.72  ICD-9-CM: 008.45  8/24/2017        Thrombocytopenia ([facility].) ICD-10-CM: D69.6  ICD-9-CM: 287.5  8/24/2017            Margareth Zamarripa III \"Rajinder\" returns to the [address] for management of 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 The active problem list, all pertinent past medical history, medications, liver histology, endoscopic studies,   radiologic findings and laboratory findings related to the liver disorder were reviewed with the patient. The patient is a 53 y.o. Black male who was first found to have chronic liver disease and cirrhosis in 2017 when he was hospitalized for sepsis. An assessment of liver fibrosis with elastography demonstrated cirrhosis. The patient has not developed any major complications of cirrhosis to date. The patient does not have esophageal or gastric varices. Report from EGD 10/2017 by Chavez Drought. No documentation of varices on report.      The most recent laboratory studies indicate the liver transaminases are normal, ALP is normal, tests of hepatic synthetic and metabolic function are normal, total bilirubin is normal, albumin is normal and the platelet count is normal.    The patient has no symptoms which could be attributed to the liver disorder. He notes no complaints or changes today. The patient completes all daily activities without any functional limitations. The patient has not experienced fatigue, pain in the right side over the liver, yellowing of the eyes or skin, itching, problems concentrating, swelling of the abdomen or swelling of the lower extremities. ALLERGIES  Allergies   Allergen Reactions    Aspirin Rash     MEDICATIONS  Current Outpatient Prescriptions   Medication Sig    B.infantis-B.ani-B.long-B.bifi (PROBIOTIC 4X) 10-15 mg TbEC Take  by mouth.  therapeutic multivitamin (THERA) tablet Take 1 Tab by mouth daily. No current facility-administered medications for this visit. SYSTEM REVIEW NOT RELATED TO LIVER DISEASE OR REVIEWED ABOVE:  Constitution systems: Negative for fever, chills, weight gain, weight loss. Eyes: Negative for visual changes. ENT: Negative for sore throat, painful swallowing. Respiratory: Negative for cough, hemoptysis, SOB. Cardiology: Negative for chest pain, palpitations. GI:  Negative for constipation or diarrhea. : Negative for urinary frequency, dysuria, hematuria, nocturia. Skin: Negative for rash. Hematology: Negative for easy bruising, blood clots. Musculo-skeletal: Negative for back pain, muscle pain, weakness. Neurologic: Negative for headaches, dizziness, vertigo, memory problems not related to HE. Psychology: Negative for anxiety, depression. FAMILY HISTORY:  The father  of prostate cancer. The mother is alive and healthy. There is no family history of liver disease. SOCIAL HISTORY:  The patient is . The patient has 2 children. The patient has never used tobacco products. The patient has previously consumed alcohol in excess. The patient has been abstinent from alcohol since 8/2017. The patient currently works full time for a construction company. PHYSICAL EXAMINATION:  Visit Vitals    /76 (BP 1 Location: Left arm, BP Patient Position: Sitting)    Pulse 87    Temp 97.9 °F (36.6 °C) (Tympanic)    Ht 5' 6\" (1.676 m)    Wt 140 lb 12.8 oz (63.9 kg)    SpO2 99%    BMI 22.73 kg/m2     General: No acute distress. Eyes: Sclera anicteric. ENT: No oral lesions. Nodes: No adenopathy. Skin: No spider angiomata. No jaundice. No palmar erythema. Respiratory: Lungs clear to auscultation. Cardiovascular: Regular heart rate. No murmurs. No JVD. Abdomen: Soft non-tender. Liver size normal to percussion/palpation. Spleen not palpable. No obvious ascites. Extremities: No edema. No muscle wasting. No gross arthritic changes. Neurologic: Alert and oriented. Cranial nerves grossly intact. No asterixis.     LABORATORY STUDIES:  Liver [place] of [address] Units 2/22/2018 1/31/2018   WBC 3.4 - 10.8 x10E3/uL 4.8 4.8   ANC 1.4 - 7.0 x10E3/uL     HGB 13.0 - 17.7 g/dL 13.1 12.9 (L)    - 379 x10E3/uL 148 (L) 145 (L)   INR 0.8 - 1.2 1.1 1.1   AST 0 - 40 IU/L 31 31   ALT 0 - 44 IU/L 20 21   Alk Phos 39 - 117 IU/L 93 89   Bili, Total 0.0 - 1.2 mg/dL 0.8 0.8   Bili, Direct 0.00 - 0.40 mg/dL 0.27 0.25   Albumin 3.5 - 5.5 g/dL 4.3 4.5   BUN 6 - 24 mg/dL 14 15   Creat 0.76 - 1.27 mg/dL 1.22 1.17   Na 134 - 144 mmol/L 140 142   K 3.5 - 5.2 mmol/L 4.0 4.5   Cl 96 - 106 mmol/L 100 101   CO2 18 - 29 mmol/L 25 26   Glucose 65 - 99 mg/dL 79 81   Magnesium 1.6 - 2.4 mg/dL       Liver [place] [facility] Latest Ref Rng & Units 12/29/2017   WBC 3.4 - 10.8 x10E3/uL    ANC 1.4 - 7.0 x10E3/uL    HGB 13.0 - 17.7 g/dL     - 379 x10E3/uL    INR 0.8 - 1.2 1.1   AST 0 - 40 IU/L 32   ALT 0 - 44 IU/L 25   Alk Phos 39 - 117 IU/L 82   Bili, Total 0.0 - 1.2 mg/dL 0.8   Bili, Direct 0.00 - 0.40 mg/dL 0.23   Albumin 3.5 - 5.5 g/dL 4.4   BUN 6 - 24 mg/dL 20   Creat 0.76 - 1.27 mg/dL 1.29 (H)   Na 134 - 144 mmol/L 144   K 3.5 - 5.2 mmol/L 4.1   Cl 96 - 106 mmol/L 104   CO2 18 - 29 mmol/L 27   Glucose 65 - 99 mg/dL 77   Magnesium 1.6 - 2.4 mg/dL      SEROLOGIES:  Serologies Latest Ref Rng & Units 2/22/2018 1/31/2018   Hep A Ab, Total Negative     Hep B Surface Ag Index     Hep B Surface Ag Interp NEG       Hep B Core Ab, Total Negative     Hep B Surface AB QL      Hep C Ab 0.0 - 0.9 s/co ratio     Hep C Ab NR       Ferritin 30 - 400 ng/mL 659 (H) 781 (H)   Iron % Saturation 15 - 55 %     ANSLEY Ab, Direct Negative     C-ANCA Neg:<1:20 titer     P-ANCA Neg:<1:20 titer     ANCA Neg:<1:20 titer     ASMCA 0 - 19 Units     M2 Ab 0.0 - 20.0 Units     Ceruloplasmin 16.0 - 31.0 mg/dL       Serologies Latest Ref Rng & Units 12/29/2017 8/24/2017   Hep A Ab, Total Negative Negative    Hep B Surface Ag Index  0.23   Hep B Surface Ag Interp NEG    NEGATIVE   Hep B Core Ab, Total Negative Negative    Hep B Surface AB QL  Non Reactive    Hep C Ab 0.0 - 0.9 s/co ratio <0.1    Hep C Ab NR    NONREACTIVE   Ferritin 30 - 400 ng/mL 833 (H)    Iron % Saturation 15 - 55 % 22    ANSLEY Ab, Direct Negative Negative    C-ANCA Neg:<1:20 titer <1:20    P-ANCA Neg:<1:20 titer <1:20    ANCA Neg:<1:20 titer <1:20    ASMCA 0 - 19 Units 19    M2 Ab 0.0 - 20.0 Units 31.0 (H)    Ceruloplasmin 16.0 - 31.0 mg/dL 20.2        LIVER HISTOLOGY:  1/2018. FibroScan performed at [address] EkPa was 22.8. IQR/med 16%. The results suggested a fibrosis level of F4. CAP is 242, not consistent with fatty liver disease. ENDOSCOPIC PROCEDURES:  10/2017. Reports of an EGD without varices. The patient brought in discharge paperwork which includes physician findings. No mention of varices. 10/2017: Colonoscopy with Dr. Marya Head. Normal.     RADIOLOGY:  8/2017.   CT scan abdomen with IV contrast.  Normal appearing liver. No liver mass lesions. Normal spleen. Moderate ascites. OTHER TESTING:  Not available or performed    ASSESSMENT AND PLAN:  Liver disease with cirrhosis. The etiology of the liver disease is most likely secondary to alcohol. Serologic testing for causes of chronic liver disease were ordered. All were negative. There is an elevation in ferritin with normal iron saturation. It is unlikely the patient has hemochromatosis or is a carrier for an HFE gene. The elevation in ferritin is secondary to alcohol use and is gradually declining with abstinence. It is continuing to decline. Liver transaminases are normal.  Alkaline phosphatase is normal.  Liver function is normal.  Total bilirubin is normal.  Serum albumin is normal.  The platelet count is depressed. Will perform laboratory testing to monitor liver function and degree of liver injury. This will include BMP, hepatic panel and CBC with platelet count. The patient has normal liver function. The CTP is 5. Child class A. The MELD score is 8. The patient does not require liver transplant evaluation at this time. If he remains abstinent from alcohol, he will likely never require a liver transplant. Ascites has resolved without the need for diuretics. Lower extremity edema has not developed. Last upper endoscopy to assess for varices was performed in 10/2017. Next screening EGD will be in 10/2019. Hepatic encephalopathy has not developed to date. There is no need for treatment with lactulose and/or Xifaxin at this time. There is no need to restrict dietary protein. The patient was directed to continue all current medications at the current dosages. There are no contraindications for the patient to take any medications that are necessary for treatment of other medical issues.       The patient was advised to continue to remain abstinent of all alcohol. Thrombocytopenia secondary to cirrhosis. There is no evidence of overt bleeding. Anemia of unclear etiology. The iron panel is not suggestive of FE deficiency. This could also be from chronic bone marrow suppression from alcohol and is recovering with time. The risk of osteoporosis is increased in patients with cirrhosis. DEXA bone density to assess for osteoporosis has not been performed. Vaccination for viral hepatitis A and B is recommended since the patient has no serologic evidence of previous exposure or vaccination with immunity. [facility] screening has recently been performed and does not suggest [facility] The next liver imaging study will be performed in 8/2018. AFP is due next in June 2018. I will order them closer to the dates. All of the above issues were discussed with the patient. All questions were answered. The patient expressed a clear understanding of the above. [facility] 87 in 4 weeks to review all data and determine the treatment plan. Then may stretch out to every 2 months since will have achieved over 6 months sobriety.     CAROLINE Pichardo-BC  Liver [place] of [address] [place] [address] 22.  [phone]

## 2018-03-26 NOTE — MR AVS SNAPSHOT
27[address] [facility] Tate [id number] [address] [place] [address] 
[phone] Patient: Justino Cummings 
MRN: [id number]:8/5/6835 Visit Information Date & Time Provider Department Dept. Phone Encounter #  
 3/26/2018 12:15 PM Amina Jensen, 3687 [facility] janelle [facility] 219 [id number] Follow-up Instructions Return in about 2 months (around 5/26/2018) for jerome. Your Appointments 5/23/2018  2:30 PM  
Follow Up with Amina Jensen, NP Nicole 75 ([facility]) Appt Note: Follow up [address] [facility] [phone] [facility] [address]  
[address] [facility] [place] [address] Upcoming Health Maintenance Date Due Pneumococcal 19-64 Highest Risk (1 of 3 - PCV13) 1/4/1990 DTaP/Tdap/Td series (1 - Tdap) 1/4/1992 Influenza Age 5 to Adult 8/1/2017 Allergies as of 3/26/2018  Review Complete On: 3/26/2018 By: Cinda James LPN Severity Noted Reaction Type Reactions Aspirin  10/28/2011    Rash Current Immunizations  Never Reviewed No immunizations on file. Not reviewed this visit You Were Diagnosed With   
  
 Codes Comments Alcoholic cirrhosis of liver without ascites ([facility]    -  Primary ICD-10-CM: K70.30 ICD-9-CM: 571.2 Vitals BP Pulse Temp Height(growth percentile) 106/76 (BP 1 Location: Left arm, BP Patient Position: Sitting) 87 97.9 °F (36.6 °C) (Tympanic) 5' 6\" (1.676 m) Weight(growth percentile) SpO2 BMI Smoking Status 140 lb 12.8 oz (63.9 kg) 99% 22.73 kg/m2 Former Smoker Vitals History BMI and BSA Data Body Mass Index Body Surface Area  
 22.73 kg/m 2 1.72 m 2 Preferred Pharmacy Pharmacy Name Phone Angela [address]ales  [place] [phone] Your Updated Medication List  
  
   
 This list is accurate as of 3/26/18 12:28 PM.  Always use your most recent med list.  
  
  
  
  
 PROBIOTIC 4X 10-15 mg Tbec Generic drug:  B.infantis-B.ani-B.long-B.bifi Take  by mouth. THERA tablet Generic drug:  therapeutic multivitamin Take 1 Tab by mouth daily. We Performed the Following CBC W/O DIFF [21702 CPT(R)] HEPATIC FUNCTION PANEL (6) [[id number] Custom] METABOLIC PANEL, BASIC [95664 CPT(R)] Follow-up Instructions Return in about 2 months (around 5/26/2018) for jerome. Introducing [facility] & HEALTH SERVICES! Dear Herman Hunt III: 
Thank you for requesting a [employer] account. Our records indicate that you already have an active [employer] account. You can access your account anytime at [email or web address] Did you know that you can access your hospital and ER discharge instructions at any time in [employer]? You can also review all of your test results from your hospital stay or ER visit. Additional Information If you have questions, please visit the Frequently Asked Questions section of the [employer] website at [email or web address] Remember, [employer] is NOT to be used for urgent needs. For medical emergencies, dial 911. Now available from your iPhone and Android! Please provide this summary of care documentation to your next provider. Your primary care clinician is listed as NONE. If you have any questions after today's visit, please call [phone].

## 2018-03-26 NOTE — PROGRESS NOTES
Chief Complaint   Patient presents with    Follow-up     Visit Vitals    /76 (BP 1 Location: Left arm, BP Patient Position: Sitting)    Pulse 87    Temp 97.9 °F (36.6 °C) (Tympanic)    Ht 5' 6\" (1.676 m)    Wt 140 lb 12.8 oz (63.9 kg)    SpO2 99%    BMI 22.73 kg/m2     PHQ over the last two weeks 3/26/2018   Little interest or pleasure in doing things Not at all   Feeling down, depressed or hopeless Not at all   Total Score PHQ 2 0

## 2018-03-27 LAB
ALBUMIN SERPL-MCNC: 4.3 G/DL (ref 3.5–5.5)
ALP SERPL-CCNC: 99 IU/L (ref 39–117)
ALT SERPL-CCNC: 21 IU/L (ref 0–44)
AST SERPL-CCNC: 27 IU/L (ref 0–40)
BILIRUB DIRECT SERPL-MCNC: 0.25 MG/DL (ref 0–0.4)
BILIRUB SERPL-MCNC: 0.9 MG/DL (ref 0–1.2)
BUN SERPL-MCNC: 15 MG/DL (ref 6–24)
BUN/CREAT SERPL: 11 (ref 9–20)
CALCIUM SERPL-MCNC: 9.3 MG/DL (ref 8.7–10.2)
CHLORIDE SERPL-SCNC: 101 MMOL/L (ref 96–106)
CO2 SERPL-SCNC: 26 MMOL/L (ref 18–29)
CREAT SERPL-MCNC: 1.31 MG/DL (ref 0.76–1.27)
ERYTHROCYTE [DISTWIDTH] IN BLOOD BY AUTOMATED COUNT: 13.1 % (ref 12.3–15.4)
GFR SERPLBLD CREATININE-BSD FMLA CKD-EPI: 64 ML/MIN/1.73
GFR SERPLBLD CREATININE-BSD FMLA CKD-EPI: 74 ML/MIN/1.73
GLUCOSE SERPL-MCNC: 78 MG/DL (ref 65–99)
HCT VFR BLD AUTO: 38.8 % (ref 37.5–51)
HGB BLD-MCNC: 13 G/DL (ref 13–17.7)
MCH RBC QN AUTO: 32.3 PG (ref 26.6–33)
MCHC RBC AUTO-ENTMCNC: 33.5 G/DL (ref 31.5–35.7)
MCV RBC AUTO: 96 FL (ref 79–97)
PLATELET # BLD AUTO: 177 X10E3/UL (ref 150–379)
POTASSIUM SERPL-SCNC: 4.3 MMOL/L (ref 3.5–5.2)
RBC # BLD AUTO: 4.03 X10E6/UL (ref 4.14–5.8)
SODIUM SERPL-SCNC: 143 MMOL/L (ref 134–144)
WBC # BLD AUTO: 5.2 X10E3/UL (ref 3.4–10.8)

## 2018-05-23 ENCOUNTER — OFFICE VISIT (OUTPATIENT)
Dept: HEMATOLOGY | Age: 48
End: 2018-05-23

## 2018-05-23 VITALS
HEIGHT: 66 IN | OXYGEN SATURATION: 99 % | DIASTOLIC BLOOD PRESSURE: 69 MMHG | BODY MASS INDEX: 22.31 KG/M2 | HEART RATE: 78 BPM | TEMPERATURE: 98.4 F | SYSTOLIC BLOOD PRESSURE: 103 MMHG | WEIGHT: 138.8 LBS

## 2018-05-23 DIAGNOSIS — K70.30 ALCOHOLIC CIRRHOSIS OF LIVER WITHOUT ASCITES (HCC): Primary | ICD-10-CM

## 2018-05-23 NOTE — PROGRESS NOTES
Chief Complaint   Patient presents with    Follow-up     Visit Vitals    /69 (BP 1 Location: Left arm, BP Patient Position: Sitting)    Pulse 78    Temp 98.4 °F (36.9 °C) (Tympanic)    Ht 5' 6\" (1.676 m)    Wt 138 lb 12.8 oz (63 kg)    SpO2 99%    BMI 22.4 kg/m2     PHQ over the last two weeks 5/23/2018   Little interest or pleasure in doing things Not at all   Feeling down, depressed or hopeless Not at all   Total Score PHQ 2 0     1. Have you been to the ER, urgent care clinic since your last visit? Hospitalized since your last visit?no    2. Have you seen or consulted any other health care providers outside of the [address] [place] [address] since your last visit? Include any pap smears or colon screening.   no

## 2018-05-23 NOTE — MR AVS SNAPSHOT
2700 [facility] Tate [id number] [address] [place] [address] 
[phone] Patient: Vicente Willis 
MRN: [id number]:2/9/0358 Visit Information Date & Time Provider Department Dept. Phone Encounter #  
 5/23/2018  2:30 PM Lacey Hammer, [facility] 219 [id number] Follow-up Instructions Return in about 2 months (around 7/23/2018) for jerome. Follow-up and Disposition History Your Appointments 7/26/2018  1:30 PM  
Follow Up with TAVON Green 75 (3651 [facility]) Appt Note: Follow up [address] [facility] [id number] [facility] [address]  
59 [facility] [place] [address] Upcoming Health Maintenance Date Due Pneumococcal 19-64 Highest Risk (1 of 3 - PCV13) 1/4/1990 DTaP/Tdap/Td series (1 - Tdap) 1/4/1992 Influenza Age 5 to Adult 8/1/2018 Allergies as of 5/23/2018  Review Complete On: 5/23/2018 By: Artie Jon. Mack Hammer NP Severity Noted Reaction Type Reactions Aspirin  10/28/2011    Rash Current Immunizations  Never Reviewed No immunizations on file. Not reviewed this visit You Were Diagnosed With   
  
 Codes Comments Alcoholic cirrhosis of liver without ascites ([facility]    -  Primary ICD-10-CM: K70.30 ICD-9-CM: 571.2 Vitals BP Pulse Temp Height(growth percentile) 103/69 (BP 1 Location: Left arm, BP Patient Position: Sitting) 78 98.4 °F (36.9 °C) (Tympanic) 5' 6\" (1.676 m) Weight(growth percentile) SpO2 BMI Smoking Status 138 lb 12.8 oz (63 kg) 99% 22.4 kg/m2 Former Smoker Vitals History BMI and BSA Data Body Mass Index Body Surface Area  
 22.4 kg/m 2 1.71 m 2 Preferred Pharmacy Pharmacy Name Phone Angela [address] [employer] [address]  [place] [phone] Your Updated Medication List  
 This list is accurate as of 5/23/18  2:51 PM.  Always use your most recent med list.  
  
  
  
  
 PROBIOTIC 4X 10-15 mg Tbec Generic drug:  B.infantis-B.ani-B.long-B.bifi Take  by mouth. THERA tablet Generic drug:  therapeutic multivitamin Take 1 Tab by mouth daily. We Performed the Following AFP WITH AFP-L3% [[id number] Custom] CBC W/O DIFF [84770 CPT(R)] HEPATIC FUNCTION PANEL [16781 CPT(R)] METABOLIC PANEL, BASIC [49189 CPT(R)] PROTHROMBIN TIME + INR [28954 CPT(R)] Follow-up Instructions Return in about 2 months (around 7/23/2018) for jerome. Introducing [facility] & HEALTH SERVICES! Dear Luz Marina Alas III: 
Thank you for requesting a [employer] account. Our records indicate that you already have an active [employer] account. You can access your account anytime at [email or web address] Did you know that you can access your hospital and ER discharge instructions at any time in [employer]? You can also review all of your test results from your hospital stay or ER visit. Additional Information If you have questions, please visit the Frequently Asked Questions section of the [employer] website at [email or web address] Remember, [employer] is NOT to be used for urgent needs. For medical emergencies, dial 911. Now available from your iPhone and Android! Please provide this summary of care documentation to your next provider. Your primary care clinician is listed as NONE. If you have any questions after today's visit, please call [phone].

## 2018-05-23 NOTE — PROGRESS NOTES
[address] MD, [address], [place], [place]       TAVON Luz PA-C Valrie Krone, [facility]   TAVON Thomas NP        at [address], [address] [address]     [phone]     FAX: [phone]    at [address], [address]     [phone]     FAX: [phone]     Patient Care Team:  None as PCP - Arcenio Avelar MD (Gastroenterology)    Problem List  Date Reviewed: 3/26/2018          Codes Class Noted    Alcoholic cirrhosis of liver without ascites ([facility] 75.) ICD-10-CM: K70.30  ICD-9-CM: 571.2  1/31/2018        Elevated liver enzymes ICD-10-CM: R74.8  ICD-9-CM: 790.5  12/31/2017        Anemia ICD-10-CM: D64.9  ICD-9-CM: 285.9  10/11/2017        Lytic bone lesion of hip ICD-10-CM: M89.8X5  ICD-9-CM: 733.99  9/16/2017        Clostridium difficile diarrhea ICD-10-CM: A04.72  ICD-9-CM: 008.45  8/24/2017        Thrombocytopenia ([facility] 75.) ICD-10-CM: D69.6  ICD-9-CM: 287.5  8/24/2017            Goodrich Pi III \"Rajinder\" [facility] for management of 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 The active problem list, all pertinent past medical history, medications, liver histology, endoscopic studies,   radiologic findings and laboratory findings related to the liver disorder were reviewed with the patient. The patient is a 53 y.o. Black male who was first found to have chronic liver disease and cirrhosis in 2017 when he was hospitalized for sepsis. An assessment of liver fibrosis with elastography demonstrated cirrhosis. The patient has not developed any major complications of cirrhosis to date. The patient does not have esophageal or gastric varices. Report from EGD 10/2017 by Madan Teixeira. No documentation of varices on report.      The most recent laboratory studies indicate the liver transaminases are normal, ALP is normal, tests of hepatic synthetic and metabolic function are normal, total bilirubin is normal, albumin is normal and the platelet count is normal.    The patient has no symptoms which could be attributed to the liver disorder. He notes no complaints or changes today. The patient completes all daily activities without any functional limitations. The patient has not experienced fatigue, pain in the right side over the liver, yellowing of the eyes or skin, itching, problems concentrating, swelling of the abdomen or swelling of the lower extremities. ALLERGIES  Allergies   Allergen Reactions    Aspirin Rash     MEDICATIONS  Current Outpatient Prescriptions   Medication Sig    B.infantis-B.ani-B.long-B.bifi (PROBIOTIC 4X) 10-15 mg TbEC Take  by mouth.  therapeutic multivitamin (THERA) tablet Take 1 Tab by mouth daily. No current facility-administered medications for this visit. SYSTEM REVIEW NOT RELATED TO LIVER DISEASE OR REVIEWED ABOVE:  Constitution systems: Negative for fever, chills, weight gain, weight loss. Eyes: Negative for visual changes. ENT: Negative for sore throat, painful swallowing. Respiratory: Negative for cough, hemoptysis, SOB. Cardiology: Negative for chest pain, palpitations. GI:  Negative for constipation or diarrhea. : Negative for urinary frequency, dysuria, hematuria, nocturia. Skin: Negative for rash. Hematology: Negative for easy bruising, blood clots. Musculo-skeletal: Negative for back pain, muscle pain, weakness. Neurologic: Negative for headaches, dizziness, vertigo, memory problems not related to HE. Psychology: Negative for anxiety, depression. FAMILY HISTORY:  The father  of prostate cancer. The mother is alive and healthy. There is no family history of liver disease. SOCIAL HISTORY:  The patient is . The patient has 2 children. The patient has never used tobacco products. The patient has previously consumed alcohol in excess. The patient has been abstinent from alcohol since 8/2017. The patient currently works full time for a construction company. PHYSICAL EXAMINATION:  There were no vitals taken for this visit. General: No acute distress. Eyes: Sclera anicteric. ENT: No oral lesions. Nodes: No adenopathy. Skin: No spider angiomata. No jaundice. No palmar erythema. Respiratory: Lungs clear to auscultation. Cardiovascular: Regular heart rate. No murmurs. No JVD. Abdomen: Soft non-tender. Liver size normal to percussion/palpation. Spleen not palpable. No obvious ascites. Extremities: No edema. No muscle wasting. No gross arthritic changes. Neurologic: Alert and oriented. Cranial nerves grossly intact. No asterixis.     LABORATORY STUDIES:  [facility] of [address] [place] [address] St Units 3/26/2018 2/22/2018   WBC 3.4 - 10.8 x10E3/uL 5.2 4.8   ANC 1.4 - 7.0 x10E3/uL     HGB 13.0 - 17.7 g/dL 13.0 13.1    - 379 x10E3/uL 177 148 (L)   INR 0.8 - 1.2  1.1   AST 0 - 40 IU/L 27 31   ALT 0 - 44 IU/L 21 20   Alk Phos 39 - 117 IU/L 99 93   Bili, Total 0.0 - 1.2 mg/dL 0.9 0.8   Bili, Direct 0.00 - 0.40 mg/dL 0.25 0.27   Albumin 3.5 - 5.5 g/dL 4.3 4.3   BUN 6 - 24 mg/dL 15 14   Creat 0.76 - 1.27 mg/dL 1.31 (H) 1.22   Na 134 - 144 mmol/L 143 140   K 3.5 - 5.2 mmol/L 4.3 4.0   Cl 96 - 106 mmol/L 101 100   CO2 18 - 29 mmol/L 26 25   Glucose 65 - 99 mg/dL 78 79   Magnesium 1.6 - 2.4 mg/dL       Liver [place] [address] Ref Rng & Units 1/31/2018   WBC 3.4 - 10.8 x10E3/uL 4.8   ANC 1.4 - 7.0 x10E3/uL    HGB 13.0 - 17.7 g/dL 12.9 (L)    - 379 x10E3/uL 145 (L)   INR 0.8 - 1.2 1.1   AST 0 - 40 IU/L 31   ALT 0 - 44 IU/L 21   Alk Phos 39 - 117 IU/L 89   Bili, Total 0.0 - 1.2 mg/dL 0.8   Bili, Direct 0.00 - 0.40 mg/dL 0.25   Albumin 3.5 - 5.5 g/dL 4.5   BUN 6 - 24 mg/dL 15   Creat 0.76 - 1.27 mg/dL 1.17 Na 134 - 144 mmol/L 142   K 3.5 - 5.2 mmol/L 4.5   Cl 96 - 106 mmol/L 101   CO2 18 - 29 mmol/L 26   Glucose 65 - 99 mg/dL 81   Magnesium 1.6 - 2.4 mg/dL      SEROLOGIES:  Serologies Latest Ref Rng & Units 2/22/2018 1/31/2018   Hep A Ab, Total Negative     Hep B Surface Ag Index     Hep B Surface Ag Interp NEG       Hep B Core Ab, Total Negative     Hep B Surface AB QL      Hep C Ab 0.0 - 0.9 s/co ratio     Hep C Ab NR       Ferritin 30 - 400 ng/mL 659 (H) 781 (H)   Iron % Saturation 15 - 55 %     ANSLEY Ab, Direct Negative     C-ANCA Neg:<1:20 titer     P-ANCA Neg:<1:20 titer     ANCA Neg:<1:20 titer     ASMCA 0 - 19 Units     M2 Ab 0.0 - 20.0 Units     Ceruloplasmin 16.0 - 31.0 mg/dL       Serologies Latest Ref Rng & Units 12/29/2017 8/24/2017   Hep A Ab, Total Negative Negative    Hep B Surface Ag Index  0.23   Hep B Surface Ag Interp NEG    NEGATIVE   Hep B Core Ab, Total Negative Negative    Hep B Surface AB QL  Non Reactive    Hep C Ab 0.0 - 0.9 s/co ratio <0.1    Hep C Ab NR    NONREACTIVE   Ferritin 30 - 400 ng/mL 833 (H)    Iron % Saturation 15 - 55 % 22    ANSLEY Ab, Direct Negative Negative    C-ANCA Neg:<1:20 titer <1:20    P-ANCA Neg:<1:20 titer <1:20    ANCA Neg:<1:20 titer <1:20    ASMCA 0 - 19 Units 19    M2 Ab 0.0 - 20.0 Units 31.0 (H)    Ceruloplasmin 16.0 - 31.0 mg/dL 20.2      LIVER HISTOLOGY:  1/2018. FibroScan performed at [address]. EkPa was 22.8. IQR/med 16%. The results suggested a fibrosis level of F4. CAP is 242, not consistent with fatty liver disease. ENDOSCOPIC PROCEDURES:  10/2017. Reports of an EGD without varices. The patient brought in discharge paperwork which includes physician findings. No mention of varices. 10/2017: Colonoscopy with Dr. Sophia Santoro. Normal.     RADIOLOGY:  8/2017. CT scan abdomen with IV contrast.  Normal appearing liver. No liver mass lesions. Normal spleen. Moderate ascites.     OTHER TESTING:  Not available or performed    ASSESSMENT AND PLAN:  Liver disease with cirrhosis. The etiology of the liver disease is most likely secondary to alcohol. Serologic testing for causes of chronic liver disease were ordered. All were negative. There is an elevation in ferritin with normal iron saturation. It is unlikely the patient has hemochromatosis or is a carrier for an HFE gene. The elevation in ferritin is secondary to alcohol use and is gradually declining with abstinence. It is continuing to decline. Liver transaminases are normal.  Alkaline phosphatase is normal.  Liver function is normal.  Total bilirubin is normal.  Serum albumin is normal.  The platelet count is depressed. Will perform laboratory testing to monitor liver function and degree of liver injury. This will include BMP, hepatic panel and CBC with platelet count. The patient has normal liver function. The CTP is 5. Child class A. The MELD score is 10. The patient does not require liver transplant evaluation at this time. If he remains abstinent from alcohol, he will likely never require a liver transplant. Ascites has resolved without the need for diuretics. Lower extremity edema has not developed. Last upper endoscopy to assess for varices was performed in 10/2017. Next screening EGD will be in 10/2019. Hepatic encephalopathy has not developed to date. There is no need for treatment with lactulose and/or Xifaxan at this time. There is no need to restrict dietary protein. The patient was directed to continue all current medications at the current dosages. There are no contraindications for the patient to take any medications that are necessary for treatment of other medical issues. The patient was advised to continue to remain abstinent of all alcohol. Thrombocytopenia secondary to cirrhosis. There is no evidence of overt bleeding. Anemia has resolved.      The risk of osteoporosis is increased in patients with cirrhosis. DEXA bone density to assess for osteoporosis has not been performed. Vaccination for viral hepatitis A and B is recommended since the patient has no serologic evidence of previous exposure or vaccination with immunity. [facility] screening has recently been performed and does not suggest [facility] The next liver imaging study will be performed in 8/2018. AFP is due next in June 2018. This was ordered today. I will order imaging at next visit. All of the above issues were discussed with the patient. All questions were answered. The patient expressed a clear understanding of the above. [facility]. He is 6 months sober now.      Carol Dela Cruz, [facility]-BC  Liver [place] of [facility] [address] [place], [address] [address]  [address].  [phone]

## 2018-05-24 LAB
AFP L3 MFR SERPL: NORMAL % (ref 0–9.9)
AFP SERPL-MCNC: 1.3 NG/ML (ref 0–8)
ALBUMIN SERPL-MCNC: 4.4 G/DL (ref 3.5–5.5)
ALP SERPL-CCNC: 100 IU/L (ref 39–117)
ALT SERPL-CCNC: 23 IU/L (ref 0–44)
AST SERPL-CCNC: 34 IU/L (ref 0–40)
BILIRUB DIRECT SERPL-MCNC: 0.2 MG/DL (ref 0–0.4)
BILIRUB SERPL-MCNC: 0.8 MG/DL (ref 0–1.2)
BUN SERPL-MCNC: 18 MG/DL (ref 6–24)
BUN/CREAT SERPL: 14 (ref 9–20)
CALCIUM SERPL-MCNC: 8.9 MG/DL (ref 8.7–10.2)
CHLORIDE SERPL-SCNC: 107 MMOL/L (ref 96–106)
CO2 SERPL-SCNC: 27 MMOL/L (ref 18–29)
CREAT SERPL-MCNC: 1.33 MG/DL (ref 0.76–1.27)
ERYTHROCYTE [DISTWIDTH] IN BLOOD BY AUTOMATED COUNT: 12.6 % (ref 12.3–15.4)
GFR SERPLBLD CREATININE-BSD FMLA CKD-EPI: 63 ML/MIN/1.73
GFR SERPLBLD CREATININE-BSD FMLA CKD-EPI: 73 ML/MIN/1.73
GLUCOSE SERPL-MCNC: 70 MG/DL (ref 65–99)
HCT VFR BLD AUTO: 38.6 % (ref 37.5–51)
HGB BLD-MCNC: 12.7 G/DL (ref 13–17.7)
INR PPP: 1.1 (ref 0.8–1.2)
MCH RBC QN AUTO: 32.1 PG (ref 26.6–33)
MCHC RBC AUTO-ENTMCNC: 32.9 G/DL (ref 31.5–35.7)
MCV RBC AUTO: 98 FL (ref 79–97)
PLATELET # BLD AUTO: 153 X10E3/UL (ref 150–379)
POTASSIUM SERPL-SCNC: 4.3 MMOL/L (ref 3.5–5.2)
PROT SERPL-MCNC: 6.9 G/DL (ref 6–8.5)
PROTHROMBIN TIME: 11.9 SEC (ref 9.1–12)
RBC # BLD AUTO: 3.96 X10E6/UL (ref 4.14–5.8)
SODIUM SERPL-SCNC: 143 MMOL/L (ref 134–144)
WBC # BLD AUTO: 4.3 X10E3/UL (ref 3.4–10.8)

## 2018-07-26 ENCOUNTER — OFFICE VISIT (OUTPATIENT)
Dept: HEMATOLOGY | Age: 48
End: 2018-07-26

## 2018-07-26 VITALS
DIASTOLIC BLOOD PRESSURE: 75 MMHG | WEIGHT: 140 LBS | BODY MASS INDEX: 22.6 KG/M2 | TEMPERATURE: 97 F | HEART RATE: 75 BPM | SYSTOLIC BLOOD PRESSURE: 115 MMHG | OXYGEN SATURATION: 98 %

## 2018-07-26 DIAGNOSIS — K70.30 ALCOHOLIC CIRRHOSIS OF LIVER WITHOUT ASCITES (HCC): Primary | ICD-10-CM

## 2018-07-26 NOTE — PROGRESS NOTES
[address] MD Saw, [address], [place], [place]       Margit Searing, NP Hazle Seip, PA-C Lori Parrot, [facility]-BC   TAVNO Lobo NP        at [address], [address] [address]     [phone]     FAX: [phone]    at [address], [address]     [phone]     FAX: [phone]     Patient Care Team:  None as PCP - Maribel Carrillo MD (Gastroenterology)    Problem List  Date Reviewed: 5/23/2018          Codes Class Noted    Alcoholic cirrhosis of liver without ascites ([facility] ICD-10-CM: K70.30  ICD-9-CM: 571.2  1/31/2018        Elevated liver enzymes ICD-10-CM: R74.8  ICD-9-CM: 790.5  12/31/2017        Anemia ICD-10-CM: D64.9  ICD-9-CM: 285.9  10/11/2017        Lytic bone lesion of hip ICD-10-CM: M89.8X5  ICD-9-CM: 733.99  9/16/2017        Clostridium difficile diarrhea ICD-10-CM: A04.72  ICD-9-CM: 008.45  8/24/2017        Thrombocytopenia ([facility] 75.) ICD-10-CM: D69.6  ICD-9-CM: 287.5  8/24/2017            Paulina Card III \"Rajinder\" [facility] for management of 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 The active problem list, all pertinent past medical history, medications, liver histology, endoscopic studies,   radiologic findings and laboratory findings related to the liver disorder were reviewed with the patient. The patient is a 53 y.o. Black male who was first found to have chronic liver disease and cirrhosis in 2017 when he was hospitalized for sepsis. An assessment of liver fibrosis with elastography demonstrated cirrhosis. The patient has not developed any major complications of cirrhosis to date. The patient does not have esophageal or gastric varices. Report from EGD 10/2017 by Domínguez Bustard. No documentation of varices on report.      The most recent laboratory studies indicate the liver transaminases are normal, ALP is normal, tests of hepatic synthetic and metabolic function are normal, total bilirubin is normal, albumin is normal and the platelet count is normal.    The patient has no symptoms which could be attributed to the liver disorder. He notes no complaints or changes today. The patient completes all daily activities without any functional limitations. The patient has not experienced fatigue, pain in the right side over the liver, yellowing of the eyes or skin, itching, problems concentrating, swelling of the abdomen or swelling of the lower extremities. He is doing and feeling great. ALLERGIES  Allergies   Allergen Reactions    Aspirin Rash     MEDICATIONS  Current Outpatient Prescriptions   Medication Sig    B.infantis-B.ani-B.long-B.bifi (PROBIOTIC 4X) 10-15 mg TbEC Take  by mouth.  therapeutic multivitamin (THERA) tablet Take 1 Tab by mouth daily. No current facility-administered medications for this visit. SYSTEM REVIEW NOT RELATED TO LIVER DISEASE OR REVIEWED ABOVE:  Constitution systems: Negative for fever, chills, weight gain, weight loss. Eyes: Negative for visual changes. ENT: Negative for sore throat, painful swallowing. Respiratory: Negative for cough, hemoptysis, SOB. Cardiology: Negative for chest pain, palpitations. GI:  Negative for constipation or diarrhea. : Negative for urinary frequency, dysuria, hematuria, nocturia. Skin: Negative for rash. Hematology: Negative for easy bruising, blood clots. Musculo-skeletal: Negative for back pain, muscle pain, weakness. Neurologic: Negative for headaches, dizziness, vertigo, memory problems not related to HE. Psychology: Negative for anxiety, depression. FAMILY HISTORY:  The father  of prostate cancer. The mother is alive and healthy. There is no family history of liver disease.       SOCIAL HISTORY:  The patient is . The patient has 2 children. The patient has never used tobacco products. The patient has previously consumed alcohol in excess. The patient has been abstinent from alcohol since 8/2017. The patient currently works full time for a construction company. PHYSICAL EXAMINATION:  Visit Vitals    /75 (BP 1 Location: Left arm, BP Patient Position: Sitting)    Pulse 75    Temp 97 °F (36.1 °C)    Wt 140 lb (63.5 kg)    SpO2 98%    BMI 22.6 kg/m2     General: No acute distress. Eyes: Sclera anicteric. ENT: No oral lesions. Nodes: No adenopathy. Skin: No spider angiomata. No jaundice. No palmar erythema. Respiratory: Lungs clear to auscultation. Cardiovascular: Regular heart rate. No murmurs. No JVD. Abdomen: Soft non-tender. Liver size normal to percussion/palpation. Spleen not palpable. No obvious ascites. Extremities: No edema. No muscle wasting. No gross arthritic changes. Neurologic: Alert and oriented. Cranial nerves grossly intact. No asterixis.     LABORATORY STUDIES:  [address] Units 3/26/2018 2/22/2018   WBC 3.4 - 10.8 x10E3/uL 5.2 4.8   ANC 1.4 - 7.0 x10E3/uL     HGB 13.0 - 17.7 g/dL 13.0 13.1    - 379 x10E3/uL 177 148 (L)   INR 0.8 - 1.2  1.1   AST 0 - 40 IU/L 27 31   ALT 0 - 44 IU/L 21 20   Alk Phos 39 - 117 IU/L 99 93   Bili, Total 0.0 - 1.2 mg/dL 0.9 0.8   Bili, Direct 0.00 - 0.40 mg/dL 0.25 0.27   Albumin 3.5 - 5.5 g/dL 4.3 4.3   BUN 6 - 24 mg/dL 15 14   Creat 0.76 - 1.27 mg/dL 1.31 (H) 1.22   Na 134 - 144 mmol/L 143 140   K 3.5 - 5.2 mmol/L 4.3 4.0   Cl 96 - 106 mmol/L 101 100   CO2 18 - 29 mmol/L 26 25   Glucose 65 - 99 mg/dL 78 79   Magnesium 1.6 - 2.4 mg/dL       Liver [facility] Latest Ref Rng & Units 1/31/2018   WBC 3.4 - 10.8 x10E3/uL 4.8   ANC 1.4 - 7.0 x10E3/uL    HGB 13.0 - 17.7 g/dL 12.9 (L)    - 379 x10E3/uL 145 (L)   INR 0.8 - 1.2 1.1   AST 0 - 40 IU/L 31   ALT 0 - 44 IU/L 21   Alk Phos 39 - 117 IU/L 89   Bili, Total 0.0 - 1.2 mg/dL 0.8   Bili, Direct 0.00 - 0.40 mg/dL 0.25   Albumin 3.5 - 5.5 g/dL 4.5   BUN 6 - 24 mg/dL 15   Creat 0.76 - 1.27 mg/dL 1.17   Na 134 - 144 mmol/L 142   K 3.5 - 5.2 mmol/L 4.5   Cl 96 - 106 mmol/L 101   CO2 18 - 29 mmol/L 26   Glucose 65 - 99 mg/dL 81   Magnesium 1.6 - 2.4 mg/dL      SEROLOGIES:  Serologies Latest Ref Rng & Units 2/22/2018 1/31/2018   Hep A Ab, Total Negative     Hep B Surface Ag Index     Hep B Surface Ag Interp NEG       Hep B Core Ab, Total Negative     Hep B Surface AB QL      Hep C Ab 0.0 - 0.9 s/co ratio     Hep C Ab NR       Ferritin 30 - 400 ng/mL 659 (H) 781 (H)   Iron % Saturation 15 - 55 %     ANSLEY Ab, Direct Negative     C-ANCA Neg:<1:20 titer     P-ANCA Neg:<1:20 titer     ANCA Neg:<1:20 titer     ASMCA 0 - 19 Units     M2 Ab 0.0 - 20.0 Units     Ceruloplasmin 16.0 - 31.0 mg/dL       Serologies Latest Ref Rng & Units 12/29/2017 8/24/2017   Hep A Ab, Total Negative Negative    Hep B Surface Ag Index  0.23   Hep B Surface Ag Interp NEG    NEGATIVE   Hep B Core Ab, Total Negative Negative    Hep B Surface AB QL  Non Reactive    Hep C Ab 0.0 - 0.9 s/co ratio <0.1    Hep C Ab NR    NONREACTIVE   Ferritin 30 - 400 ng/mL 833 (H)    Iron % Saturation 15 - 55 % 22    ANSLEY Ab, Direct Negative Negative    C-ANCA Neg:<1:20 titer <1:20    P-ANCA Neg:<1:20 titer <1:20    ANCA Neg:<1:20 titer <1:20    ASMCA 0 - 19 Units 19    M2 Ab 0.0 - 20.0 Units 31.0 (H)    Ceruloplasmin 16.0 - 31.0 mg/dL 20.2      LIVER HISTOLOGY:  1/2018. [facility]. EkPa was 22.8. IQR/med 16%. The results suggested a fibrosis level of F4. CAP is 242, not consistent with fatty liver disease. ENDOSCOPIC PROCEDURES:  10/2017. Reports of an EGD without varices. The patient brought in discharge paperwork which includes physician findings. No mention of varices. 10/2017: Colonoscopy with Dr. Ellie Negrete. Normal.     RADIOLOGY:  8/2017.   CT scan abdomen with IV contrast.  Normal appearing liver. No liver mass lesions. Normal spleen. Moderate ascites. OTHER TESTING:  Not available or performed    ASSESSMENT AND PLAN:  Liver disease with cirrhosis. The etiology of the liver disease is most likely secondary to alcohol. Serologic testing for causes of chronic liver disease were ordered. All were negative. There is an elevation in ferritin with normal iron saturation. It is unlikely the patient has hemochromatosis or is a carrier for an HFE gene. The elevation in ferritin is secondary to alcohol use and is gradually declining with abstinence. It is continuing to decline. Liver transaminases are normal.  Alkaline phosphatase is normal.  Liver function is normal.  Total bilirubin is normal.  Serum albumin is normal.  The platelet count is depressed. Will perform laboratory testing to monitor liver function and degree of liver injury. This will include BMP, hepatic panel and CBC with platelet count. The patient has normal liver function. The CTP is 5. Child class A. The MELD score is 10. The patient does not require liver transplant evaluation at this time. If he remains abstinent from alcohol, he will likely never require a liver transplant. Ascites has resolved without the need for diuretics. Lower extremity edema has not developed. Last upper endoscopy to assess for varices was performed in 10/2017. Next screening EGD will be in 10/2019. Hepatic encephalopathy has not developed to date. There is no need for treatment with lactulose and/or Xifaxan at this time. There is no need to restrict dietary protein. The patient was directed to continue all current medications at the current dosages. There are no contraindications for the patient to take any medications that are necessary for treatment of other medical issues.       The patient was advised to continue to remain abstinent of all alcohol. Thrombocytopenia secondary to cirrhosis. There is no evidence of overt bleeding. Anemia has resolved. The risk of osteoporosis is increased in patients with cirrhosis. DEXA bone density to assess for osteoporosis has not been performed. Vaccination for viral hepatitis A and B is recommended since the patient has no serologic evidence of previous exposure or vaccination with immunity. [address] screening has recently been performed and does not suggest [address] The next liver imaging study will be performed in 8/2018. AFP is due next in June 2018. This was ordered today. Ultrasound was ordered today. Next due for both in 12/2018. All of the above issues were discussed with the patient. All questions were answered. The patient expressed a clear understanding of the above. [address] in 2 months. He is 6 months sober now.      Monica Arellano, CAROLINE-BC  Liver [place] of [facility] [address] [facility], [address] [address]  [place] [address]  [address].  [phone]

## 2018-07-26 NOTE — MR AVS SNAPSHOT
[id number] [place] [address] 
[phone] Patient: Renzo Mary 
MRN: [id number] Visit Information Date & Time Provider Department Dept. Phone Encounter #  
 7/26/2018  1:30 PM Davida Castleman L. [place] Flax, [address] luis [facility] 219 [id number] Your Appointments 11/26/2018 11:00 AM  
Follow Up with Davida Castleman L. [place] Flax, TAVON Rivera 75 ([facility] Appt Note: Follow up [address] [facility] [phone] [facility] [address]  
59 [facility] [place] [address] Upcoming Health Maintenance Date Due Pneumococcal 19-64 Highest Risk (1 of 3 - PCV13) 1/4/1990 DTaP/Tdap/Td series (1 - Tdap) 1/4/1992 Influenza Age 5 to Adult 8/1/2018 Allergies as of 7/26/2018  Review Complete On: 7/26/2018 By: Jennifer Johnson Severity Noted Reaction Type Reactions Aspirin  10/28/2011    Rash Current Immunizations  Never Reviewed No immunizations on file. Not reviewed this visit You Were Diagnosed With   
  
 Codes Comments Alcoholic cirrhosis of liver without ascites ([facility] 75.)    -  Primary ICD-10-CM: K70.30 ICD-9-CM: 571.2 Vitals BP Pulse Temp Weight(growth percentile) SpO2 BMI  
 115/75 (BP 1 Location: Left arm, BP Patient Position: Sitting) 75 97 °F (36.1 °C) 140 lb (63.5 kg) 98% 22.6 kg/m2 Smoking Status Former Smoker BMI and BSA Data Body Mass Index Body Surface Area  
 22.6 kg/m 2 1.72 m 2 Preferred Pharmacy Pharmacy Name Phone Angela [address] Fareed Lisseth  [place] [phone] Your Updated Medication List  
  
   
This list is accurate as of 7/26/18  1:43 PM.  Always use your most recent med list.  
  
  
  
  
 PROBIOTIC 4X 10-15 mg Tbec Generic drug:  B.infantis-B.ani-B.long-B.bifi Take  by mouth. THERA tablet Generic drug:  therapeutic multivitamin Take 1 Tab by mouth daily. We Performed the Following CBC W/O DIFF [54350 CPT(R)] HEPATIC FUNCTION PANEL [99878 CPT(R)] METABOLIC PANEL, BASIC [34295 CPT(R)] PROTHROMBIN TIME + INR [09188 CPT(R)] To-Do List   
 07/31/2018 Imaging:  US ABD COMP Introducing [facility]! Dear Willie Kimbrough III: 
Thank you for requesting a [facility] account. Our records indicate that you already have an active [facility] account. You can access your account anytime at [email or web address] Did you know that you can access your hospital and ER discharge instructions at any time in [facility]? You can also review all of your test results from your hospital stay or ER visit. Additional Information If you have questions, please visit the Frequently Asked Questions section of the [facility] website at [email or web address] Remember, [facility] is NOT to be used for urgent needs. For medical emergencies, dial 911. Now available from your iPhone and Android! Please provide this summary of care documentation to your next provider. Your primary care clinician is listed as NONE. If you have any questions after today's visit, please call [phone].

## 2018-07-27 LAB
ALBUMIN SERPL-MCNC: 4.5 G/DL (ref 3.5–5.5)
ALP SERPL-CCNC: 92 IU/L (ref 39–117)
ALT SERPL-CCNC: 25 IU/L (ref 0–44)
AST SERPL-CCNC: 29 IU/L (ref 0–40)
BILIRUB DIRECT SERPL-MCNC: 0.24 MG/DL (ref 0–0.4)
BILIRUB SERPL-MCNC: 0.9 MG/DL (ref 0–1.2)
BUN SERPL-MCNC: 15 MG/DL (ref 6–24)
BUN/CREAT SERPL: 12 (ref 9–20)
CALCIUM SERPL-MCNC: 9.3 MG/DL (ref 8.7–10.2)
CHLORIDE SERPL-SCNC: 103 MMOL/L (ref 96–106)
CO2 SERPL-SCNC: 26 MMOL/L (ref 20–29)
CREAT SERPL-MCNC: 1.28 MG/DL (ref 0.76–1.27)
ERYTHROCYTE [DISTWIDTH] IN BLOOD BY AUTOMATED COUNT: 12.8 % (ref 12.3–15.4)
GLUCOSE SERPL-MCNC: 92 MG/DL (ref 65–99)
HCT VFR BLD AUTO: 38.6 % (ref 37.5–51)
HGB BLD-MCNC: 13 G/DL (ref 13–17.7)
INR PPP: 1.1 (ref 0.8–1.2)
MCH RBC QN AUTO: 33.4 PG (ref 26.6–33)
MCHC RBC AUTO-ENTMCNC: 33.7 G/DL (ref 31.5–35.7)
MCV RBC AUTO: 99 FL (ref 79–97)
PLATELET # BLD AUTO: 148 X10E3/UL (ref 150–379)
POTASSIUM SERPL-SCNC: 4.1 MMOL/L (ref 3.5–5.2)
PROT SERPL-MCNC: 7 G/DL (ref 6–8.5)
PROTHROMBIN TIME: 11.7 SEC (ref 9.1–12)
RBC # BLD AUTO: 3.89 X10E6/UL (ref 4.14–5.8)
SODIUM SERPL-SCNC: 143 MMOL/L (ref 134–144)
WBC # BLD AUTO: 3.9 X10E3/UL (ref 3.4–10.8)

## 2018-11-19 ENCOUNTER — HOSPITAL ENCOUNTER (OUTPATIENT)
Dept: ULTRASOUND IMAGING | Age: 48
Discharge: HOME | End: 2018-11-19
Attending: NURSE PRACTITIONER
Payer: COMMERCIAL

## 2018-11-19 DIAGNOSIS — K70.30 ALCOHOLIC CIRRHOSIS OF LIVER WITHOUT ASCITES (HCC): ICD-10-CM

## 2018-11-19 PROCEDURE — 76700 US EXAM ABDOM COMPLETE: CPT

## 2018-11-26 ENCOUNTER — OFFICE VISIT (OUTPATIENT)
Dept: HEMATOLOGY | Age: 48
End: 2018-11-26

## 2018-11-26 VITALS
HEIGHT: 66 IN | DIASTOLIC BLOOD PRESSURE: 77 MMHG | TEMPERATURE: 97.1 F | BODY MASS INDEX: 23.3 KG/M2 | WEIGHT: 145 LBS | OXYGEN SATURATION: 100 % | SYSTOLIC BLOOD PRESSURE: 118 MMHG | HEART RATE: 61 BPM

## 2018-11-26 DIAGNOSIS — K70.30 ALCOHOLIC CIRRHOSIS OF LIVER WITHOUT ASCITES (HCC): Primary | ICD-10-CM

## 2018-11-26 NOTE — PROGRESS NOTES
1. Have you been to the ER, urgent care clinic since your last visit? Hospitalized since your last visit? No    2. Have you seen or consulted any other health care providers outside of the [address] [place] [address] since your last visit? Include any pap smears or colon screening. No     Chief Complaint   Patient presents with    Follow-up     Visit Vitals  /77 (BP 1 Location: Left arm, BP Patient Position: Sitting)   Pulse 61   Temp 97.1 °F (36.2 °C) (Tympanic)   Ht 5' 6\" (1.676 m)   Wt 145 lb (65.8 kg)   SpO2 100%   BMI 23.40 kg/m²     PHQ over the last two weeks 11/26/2018   Little interest or pleasure in doing things Not at all   Feeling down, depressed, irritable, or hopeless Not at all   Total Score PHQ 2 0     Learning Assessment 11/26/2018   PRIMARY LEARNER Patient   BARRIERS PRIMARY LEARNER NONE   CO-LEARNER CAREGIVER No   PRIMARY LANGUAGE ENGLISH   LEARNER PREFERENCE PRIMARY DEMONSTRATION   ANSWERED BY patient   RELATIONSHIP SELF     Abuse Screening Questionnaire 11/26/2018   Do you ever feel afraid of your partner? N   Are you in a relationship with someone who physically or mentally threatens you? N   Is it safe for you to go home?  Arnold Koenig

## 2018-11-26 NOTE — PROGRESS NOTES
[address] MD Saw, [address], [place], [place]       Jakob Núñez, TAVON Bahena, MIGUEL Raymond, [facility]   Kenton Bridges, TAVON Mejia, TAVON        at [address], [address] [address]     [phone]     FAX: [phone]    at [address], [address] [place] [address], [address]     [phone]     FAX: [phone]     Patient Care Team:  None as PCP - General  Samantha Hernandez MD (Gastroenterology)    Problem List  Date Reviewed: 7/26/2018          Codes Class Noted    Alcoholic cirrhosis of liver without ascites ([facility] 75.) ICD-10-CM: K70.30  ICD-9-CM: 571.2  1/31/2018        Elevated liver enzymes ICD-10-CM: R74.8  ICD-9-CM: 790.5  12/31/2017        Anemia ICD-10-CM: D64.9  ICD-9-CM: 285.9  10/11/2017        Lytic bone lesion of hip ICD-10-CM: M89.8X5  ICD-9-CM: 733.99  9/16/2017        Clostridium difficile diarrhea ICD-10-CM: A04.72  ICD-9-CM: 008.45  8/24/2017        Thrombocytopenia ([facility] 75.) ICD-10-CM: D69.6  ICD-9-CM: 287.5  8/24/2017            Brand Zulay III \"Rajinder\" returns to the [address] for management of 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 The active problem list, all pertinent past medical history, medications, liver histology, endoscopic studies,   radiologic findings and laboratory findings related to the liver disorder were reviewed with the patient. The patient is a 53 y.o. Black male who was first found to have chronic liver disease and cirrhosis in 2017 when he was hospitalized for sepsis. An assessment of liver fibrosis with elastography demonstrated cirrhosis. The patient has not developed any major complications of cirrhosis to date. The patient does not have esophageal or gastric varices. Report from EGD 10/2017 by Chantell Palomares. No documentation of varices on report.      The most recent laboratory studies indicate the liver transaminases are normal, ALP is normal, tests of hepatic synthetic and metabolic function are normal, total bilirubin is normal, albumin is normal and the platelet count is normal.    The patient has no symptoms which could be attributed to the liver disorder. He notes no complaints or changes today. The patient completes all daily activities without any functional limitations. The patient has not experienced fatigue, pain in the right side over the liver, yellowing of the eyes or skin, itching, problems concentrating, swelling of the abdomen or swelling of the lower extremities. He is doing and feeling great. Wife is here with him this visit. ALLERGIES  Allergies   Allergen Reactions    Aspirin Rash     MEDICATIONS  Current Outpatient Medications   Medication Sig    B.infantis-B.ani-B.long-B.bifi (PROBIOTIC 4X) 10-15 mg TbEC Take  by mouth.  therapeutic multivitamin (THERA) tablet Take 1 Tab by mouth daily. No current facility-administered medications for this visit. SYSTEM REVIEW NOT RELATED TO LIVER DISEASE OR REVIEWED ABOVE:  Constitution systems: Negative for fever, chills, weight gain, weight loss. Eyes: Negative for visual changes. ENT: Negative for sore throat, painful swallowing. Respiratory: Negative for cough, hemoptysis, SOB. Cardiology: Negative for chest pain, palpitations. GI:  Negative for constipation or diarrhea. : Negative for urinary frequency, dysuria, hematuria, nocturia. Skin: Negative for rash. Hematology: Negative for easy bruising, blood clots. Musculo-skeletal: Negative for back pain, muscle pain, weakness. Neurologic: Negative for headaches, dizziness, vertigo, memory problems not related to HE. Psychology: Negative for anxiety, depression. FAMILY HISTORY:  The father  of prostate cancer. The mother is alive and healthy. There is no family history of liver disease.       SOCIAL HISTORY:  The patient is . The patient has 2 children. The patient has never used tobacco products. The patient has previously consumed alcohol in excess. The patient has been abstinent from alcohol since 8/2017. The patient currently works full time for a construction company. PHYSICAL EXAMINATION:  Visit Vitals  /77 (BP 1 Location: Left arm, BP Patient Position: Sitting)   Pulse 61   Temp 97.1 °F (36.2 °C) (Tympanic)   Ht 5' 6\" (1.676 m)   Wt 145 lb (65.8 kg)   SpO2 100%   BMI 23.40 kg/m²     General: No acute distress. Eyes: Sclera anicteric. ENT: No oral lesions. Nodes: No adenopathy. Skin: No spider angiomata. No jaundice. No palmar erythema. Respiratory: Lungs clear to auscultation. Cardiovascular: Regular heart rate. No murmurs. No JVD. Abdomen: Soft non-tender. Liver size normal to percussion/palpation. Spleen not palpable. No obvious ascites. Extremities: No edema. No muscle wasting. No gross arthritic changes. Neurologic: Alert and oriented. Cranial nerves grossly intact. No asterixis.     LABORATORY STUDIES:  Liver [place] of [address] Units 3/26/2018 2/22/2018   WBC 3.4 - 10.8 x10E3/uL 5.2 4.8   ANC 1.4 - 7.0 x10E3/uL     HGB 13.0 - 17.7 g/dL 13.0 13.1    - 379 x10E3/uL 177 148 (L)   INR 0.8 - 1.2  1.1   AST 0 - 40 IU/L 27 31   ALT 0 - 44 IU/L 21 20   Alk Phos 39 - 117 IU/L 99 93   Bili, Total 0.0 - 1.2 mg/dL 0.9 0.8   Bili, Direct 0.00 - 0.40 mg/dL 0.25 0.27   Albumin 3.5 - 5.5 g/dL 4.3 4.3   BUN 6 - 24 mg/dL 15 14   Creat 0.76 - 1.27 mg/dL 1.31 (H) 1.22   Na 134 - 144 mmol/L 143 140   K 3.5 - 5.2 mmol/L 4.3 4.0   Cl 96 - 106 mmol/L 101 100   CO2 18 - 29 mmol/L 26 25   Glucose 65 - 99 mg/dL 78 79   Magnesium 1.6 - 2.4 mg/dL       Liver [facility] Latest Ref Rng & Units 1/31/2018   WBC 3.4 - 10.8 x10E3/uL 4.8   ANC 1.4 - 7.0 x10E3/uL    HGB 13.0 - 17.7 g/dL 12.9 (L)    - 379 x10E3/uL 145 (L)   INR 0.8 - 1.2 1.1 AST 0 - 40 IU/L 31   ALT 0 - 44 IU/L 21   Alk Phos 39 - 117 IU/L 89   Bili, Total 0.0 - 1.2 mg/dL 0.8   Bili, Direct 0.00 - 0.40 mg/dL 0.25   Albumin 3.5 - 5.5 g/dL 4.5   BUN 6 - 24 mg/dL 15   Creat 0.76 - 1.27 mg/dL 1.17   Na 134 - 144 mmol/L 142   K 3.5 - 5.2 mmol/L 4.5   Cl 96 - 106 mmol/L 101   CO2 18 - 29 mmol/L 26   Glucose 65 - 99 mg/dL 81   Magnesium 1.6 - 2.4 mg/dL      SEROLOGIES:  Serologies Latest Ref Rng & Units 2/22/2018 1/31/2018   Hep A Ab, Total Negative     Hep B Surface Ag Index     Hep B Surface Ag Interp NEG       Hep B Core Ab, Total Negative     Hep B Surface AB QL      Hep C Ab 0.0 - 0.9 s/co ratio     Hep C Ab NR       Ferritin 30 - 400 ng/mL 659 (H) 781 (H)   Iron % Saturation 15 - 55 %     ANSLEY Ab, Direct Negative     C-ANCA Neg:<1:20 titer     P-ANCA Neg:<1:20 titer     ANCA Neg:<1:20 titer     ASMCA 0 - 19 Units     M2 Ab 0.0 - 20.0 Units     Ceruloplasmin 16.0 - 31.0 mg/dL       Serologies Latest Ref Rng & Units 12/29/2017 8/24/2017   Hep A Ab, Total Negative Negative    Hep B Surface Ag Index  0.23   Hep B Surface Ag Interp NEG    NEGATIVE   Hep B Core Ab, Total Negative Negative    Hep B Surface AB QL  Non Reactive    Hep C Ab 0.0 - 0.9 s/co ratio <0.1    Hep C Ab NR    NONREACTIVE   Ferritin 30 - 400 ng/mL 833 (H)    Iron % Saturation 15 - 55 % 22    ANSLEY Ab, Direct Negative Negative    C-ANCA Neg:<1:20 titer <1:20    P-ANCA Neg:<1:20 titer <1:20    ANCA Neg:<1:20 titer <1:20    ASMCA 0 - 19 Units 19    M2 Ab 0.0 - 20.0 Units 31.0 (H)    Ceruloplasmin 16.0 - 31.0 mg/dL 20.2      LIVER HISTOLOGY:  1/2018. [facility]. EkPa was 22.8. IQR/med 16%. The results suggested a fibrosis level of F4. CAP is 242, not consistent with fatty liver disease. ENDOSCOPIC PROCEDURES:  10/2017. Reports of an EGD without varices. The patient brought in discharge paperwork which includes physician findings. No mention of varices.       10/2017: Colonoscopy with Dr. Fermín Chao. Normal.     RADIOLOGY:  8/2017. CT scan abdomen with IV contrast. Normal appearing liver. No liver mass lesions. Normal spleen. Moderate ascites. OTHER TESTING:  Not available or performed    ASSESSMENT AND PLAN:  Liver disease with cirrhosis. The etiology of the liver disease is most likely secondary to alcohol. Serologic testing for causes of chronic liver disease were ordered. All were negative. There is an elevation in ferritin with normal iron saturation. It is unlikely the patient has hemochromatosis or is a carrier for an HFE gene. The elevation in ferritin is secondary to alcohol use and is gradually declining with abstinence. It is continuing to decline. Liver transaminases are normal.  Alkaline phosphatase is normal.  Liver function is normal.  Total bilirubin is normal.  Serum albumin is normal.  The platelet count is depressed. Will perform laboratory testing to monitor liver function and degree of liver injury. This will include BMP, hepatic panel and CBC with platelet count. The patient has normal liver function. The CTP is 5. Child class A. The MELD score is 10. The patient does not require liver transplant evaluation at this time. If he remains abstinent from alcohol, he will likely never require a liver transplant. Ascites has resolved without the need for diuretics. Lower extremity edema has not developed. Last upper endoscopy to assess for varices was performed in 10/2017. Next screening EGD will be in 10/2019. Hepatic encephalopathy has not developed to date. There is no need for treatment with lactulose and/or Xifaxan at this time. There is no need to restrict dietary protein. The patient was directed to continue all current medications at the current dosages. There are no contraindications for the patient to take any medications that are necessary for treatment of other medical issues.       The patient was advised to continue to remain abstinent of all alcohol. Thrombocytopenia secondary to cirrhosis. There is no evidence of overt bleeding. Anemia has resolved. The risk of osteoporosis is increased in patients with cirrhosis. DEXA bone density to assess for osteoporosis has not been performed. Vaccination for viral hepatitis A and B is recommended since the patient has no serologic evidence of previous exposure or vaccination with immunity. [facility] [address] screening has recently been performed and does not suggest [address] Next due for both in 12/2018. There were ordered today. All of the above issues were discussed with the patient. All questions were answered. The patient expressed a clear understanding of the above. [facility] 87 in 4 months. He is 9 months sober now.      Clifford Rios, CAROLINE-BC  Liver [place] of [facility] [address] [place] [address], [address] [facility]  [address] [facility].  [phone]

## 2018-11-27 LAB
ALBUMIN SERPL-MCNC: 4.7 G/DL (ref 3.5–5.5)
ALP SERPL-CCNC: 98 IU/L (ref 39–117)
ALT SERPL-CCNC: 32 IU/L (ref 0–44)
AST SERPL-CCNC: 30 IU/L (ref 0–40)
BILIRUB DIRECT SERPL-MCNC: 0.18 MG/DL (ref 0–0.4)
BILIRUB SERPL-MCNC: 0.8 MG/DL (ref 0–1.2)
BUN SERPL-MCNC: 11 MG/DL (ref 6–24)
BUN/CREAT SERPL: 9 (ref 9–20)
CALCIUM SERPL-MCNC: 9.3 MG/DL (ref 8.7–10.2)
CHLORIDE SERPL-SCNC: 102 MMOL/L (ref 96–106)
CO2 SERPL-SCNC: 28 MMOL/L (ref 20–29)
CREAT SERPL-MCNC: 1.28 MG/DL (ref 0.76–1.27)
ERYTHROCYTE [DISTWIDTH] IN BLOOD BY AUTOMATED COUNT: 12.6 % (ref 12.3–15.4)
GLUCOSE SERPL-MCNC: 87 MG/DL (ref 65–99)
HCT VFR BLD AUTO: 41.8 % (ref 37.5–51)
HGB BLD-MCNC: 14.2 G/DL (ref 13–17.7)
MCH RBC QN AUTO: 32.6 PG (ref 26.6–33)
MCHC RBC AUTO-ENTMCNC: 34 G/DL (ref 31.5–35.7)
MCV RBC AUTO: 96 FL (ref 79–97)
PLATELET # BLD AUTO: 156 X10E3/UL (ref 150–379)
POTASSIUM SERPL-SCNC: 4.7 MMOL/L (ref 3.5–5.2)
PROT SERPL-MCNC: 7.4 G/DL (ref 6–8.5)
RBC # BLD AUTO: 4.36 X10E6/UL (ref 4.14–5.8)
SODIUM SERPL-SCNC: 141 MMOL/L (ref 134–144)
WBC # BLD AUTO: 4.1 X10E3/UL (ref 3.4–10.8)

## 2018-11-28 LAB
AFP L3 MFR SERPL: NORMAL % (ref 0–9.9)
AFP SERPL-MCNC: 1.3 NG/ML (ref 0–8)

## 2019-03-25 ENCOUNTER — OFFICE VISIT (OUTPATIENT)
Dept: HEMATOLOGY | Age: 49
End: 2019-03-25

## 2019-03-25 VITALS
WEIGHT: 142.4 LBS | DIASTOLIC BLOOD PRESSURE: 83 MMHG | OXYGEN SATURATION: 100 % | SYSTOLIC BLOOD PRESSURE: 125 MMHG | TEMPERATURE: 97.6 F | BODY MASS INDEX: 22.98 KG/M2 | HEART RATE: 68 BPM

## 2019-03-25 DIAGNOSIS — K70.30 ALCOHOLIC CIRRHOSIS OF LIVER WITHOUT ASCITES (HCC): Primary | ICD-10-CM

## 2019-03-25 NOTE — PROGRESS NOTES
1. Have you been to the ER, urgent care clinic since your last visit? Hospitalized since your last visit? No    2. Have you seen or consulted any other health care providers outside of the [address] [place] [address] since your last visit? Include any pap smears or colon screening. No   Chief Complaint   Patient presents with    Follow-up     Visit Vitals  /83 (BP 1 Location: Right arm, BP Patient Position: Sitting)   Pulse 68   Temp 97.6 °F (36.4 °C) (Tympanic)   Wt 142 lb 6.4 oz (64.6 kg)   SpO2 100%   BMI 22.98 kg/m²     3 most recent PHQ Screens 3/25/2019   Little interest or pleasure in doing things Not at all   Feeling down, depressed, irritable, or hopeless Not at all   Total Score PHQ 2 0     Learning Assessment 3/25/2019   PRIMARY LEARNER Patient   BARRIERS PRIMARY LEARNER NONE   CO-LEARNER CAREGIVER No   PRIMARY LANGUAGE ENGLISH   LEARNER PREFERENCE PRIMARY LISTENING   ANSWERED BY patient   RELATIONSHIP SELF     Abuse Screening Questionnaire 3/25/2019   Do you ever feel afraid of your partner? N   Are you in a relationship with someone who physically or mentally threatens you? N   Is it safe for you to go home? Y     Fall Risk Assessment, last 12 mths 3/25/2019   Able to walk? Yes   Fall in past 12 months?  No

## 2019-03-25 NOTE — PROGRESS NOTES
[address], [address], [place], [place]       TAVON Francisco PA-C Alveda Spikes, [facility]   TAVON Shanks NP        at [address], [address] [address]     [phone]     FAX: [phone]    at [address], [address]     [phone]     FAX: [phone]     Patient Care Team:  None as PCP - General  Cassy Serrato MD (Gastroenterology)    Problem List  Date Reviewed: 11/28/2018          Codes Class Noted    Alcoholic cirrhosis of liver without ascites ([facility] [address] ICD-10-CM: K70.30  ICD-9-CM: 571.2  1/31/2018        Elevated liver enzymes ICD-10-CM: R74.8  ICD-9-CM: 790.5  12/31/2017        Anemia ICD-10-CM: D64.9  ICD-9-CM: 285.9  10/11/2017        Lytic bone lesion of hip ICD-10-CM: M89.8X5  ICD-9-CM: 733.99  9/16/2017        Clostridium difficile diarrhea ICD-10-CM: A04.72  ICD-9-CM: 008.45  8/24/2017        Thrombocytopenia ([facility] ICD-10-CM: D69.6  ICD-9-CM: 287.5  8/24/2017            Nanette Cadet III \"Rajinder\" returns to the [address] for management of 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 The active problem list, all pertinent past medical history, medications, liver histology, endoscopic studies, radiologic findings and laboratory findings related to the liver disorder were reviewed with the patient. The patient is a 51 y.o. Black male who was first found to have chronic liver disease and cirrhosis in 2017 when he was hospitalized for sepsis. An assessment of liver fibrosis with elastography demonstrated cirrhosis. The patient has not developed any major complications of cirrhosis to date. The patient does not have esophageal or gastric varices. Report from EGD 10/2017 by Earney Robe. No documentation of varices on report.      The most recent laboratory studies indicate the liver transaminases are normal, ALP is normal, tests of hepatic synthetic and metabolic function are normal, total bilirubin is normal, albumin is normal and the platelet count is normal.    The patient has no symptoms which could be attributed to the liver disorder. He notes no complaints or changes today. The patient completes all daily activities without any functional limitations. The patient has not experienced fatigue, pain in the right side over the liver, yellowing of the eyes or skin, itching, problems concentrating, swelling of the abdomen or swelling of the lower extremities. He is doing and feeling great. Wife is here with him this visit. He does state (after prompting from wife), he has left shoulder and back pain after lifting things he shouldn't be lifting. ALLERGIES  Allergies   Allergen Reactions    Aspirin Rash     MEDICATIONS  Current Outpatient Medications   Medication Sig    B.infantis-B.ani-B.long-B.bifi (PROBIOTIC 4X) 10-15 mg TbEC Take  by mouth.  therapeutic multivitamin (THERA) tablet Take 1 Tab by mouth daily. No current facility-administered medications for this visit. SYSTEM REVIEW NOT RELATED TO LIVER DISEASE OR REVIEWED ABOVE:  Constitution systems: Negative for fever, chills, weight gain, weight loss. Eyes: Negative for visual changes. ENT: Negative for sore throat, painful swallowing. Respiratory: Negative for cough, hemoptysis, SOB. Cardiology: Negative for chest pain, palpitations. GI:  Negative for constipation or diarrhea. : Negative for urinary frequency, dysuria, hematuria, nocturia. Skin: Negative for rash. Hematology: Negative for easy bruising, blood clots. Musculo-skeletal: Negative for back pain, muscle pain, weakness. Neurologic: Negative for headaches, dizziness, vertigo, memory problems not related to HE. Psychology: Negative for anxiety, depression.      FAMILY HISTORY:  The father  of prostate cancer. The mother is alive and healthy. There is no family history of liver disease. SOCIAL HISTORY:  The patient is . The patient has 2 children. The patient has never used tobacco products. The patient has previously consumed alcohol in excess. The patient has been abstinent from alcohol since 2017. The patient currently works full time for a construction company. PHYSICAL EXAMINATION:  Visit Vitals  /83 (BP 1 Location: Right arm, BP Patient Position: Sitting)   Pulse 68   Temp 97.6 °F (36.4 °C) (Tympanic)   Wt 142 lb 6.4 oz (64.6 kg)   SpO2 100%   BMI 22.98 kg/m²     General: No acute distress. Eyes: Sclera anicteric. ENT: No oral lesions. Nodes: No adenopathy. Skin: No spider angiomata. No jaundice. No palmar erythema. Respiratory: Lungs clear to auscultation. Cardiovascular: Regular heart rate. No murmurs. No JVD. Abdomen: Soft non-tender. Liver size normal to percussion/palpation. Spleen not palpable. No obvious ascites. Extremities: No edema. No muscle wasting. No gross arthritic changes. Neurologic: Alert and oriented. Cranial nerves grossly intact. No asterixis.     LABORATORY STUDIES:  Liver [place] of [address] Units 2018   WBC 3.4 - 10.8 x10E3/uL 4.1 3.9   ANC 1.4 - 7.0 x10E3/uL     HGB 13.0 - 17.7 g/dL 14.2 13.0    - 379 x10E3/uL 156 148 (L)   INR 0.8 - 1.2  1.1   AST 0 - 40 IU/L 30 29   ALT 0 - 44 IU/L 32 25   Alk Phos 39 - 117 IU/L 98 92   Bili, Total 0.0 - 1.2 mg/dL 0.8 0.9   Bili, Direct 0.00 - 0.40 mg/dL 0.18 0.24   Albumin 3.5 - 5.5 g/dL 4.7 4.5   BUN 6 - 24 mg/dL 11 15   Creat 0.76 - 1.27 mg/dL 1.28 (H) 1.28 (H)   Na 134 - 144 mmol/L 141 143   K 3.5 - 5.2 mmol/L 4.7 4.1   Cl 96 - 106 mmol/L 102 103   CO2 20 - 29 mmol/L 28 26   Glucose 65 - 99 mg/dL 87 92   Magnesium 1.6 - 2.4 mg/dL       Liver [place] [facility] Latest Ref Rng & Units 2018   WBC 3.4 - 10.8 x10E3/uL 4.3   ANC 1.4 - 7.0 x10E3/uL    HGB 13.0 - 17.7 g/dL 12.7 (L)    - 379 x10E3/uL 153   INR 0.8 - 1.2 1.1   AST 0 - 40 IU/L 34   ALT 0 - 44 IU/L 23   Alk Phos 39 - 117 IU/L 100   Bili, Total 0.0 - 1.2 mg/dL 0.8   Bili, Direct 0.00 - 0.40 mg/dL 0.20   Albumin 3.5 - 5.5 g/dL 4.4   BUN 6 - 24 mg/dL 18   Creat 0.76 - 1.27 mg/dL 1.33 (H)   Na 134 - 144 mmol/L 143   K 3.5 - 5.2 mmol/L 4.3   Cl 96 - 106 mmol/L 107 (H)   CO2 20 - 29 mmol/L 27   Glucose 65 - 99 mg/dL 70   Magnesium 1.6 - 2.4 mg/dL      SEROLOGIES:  Serologies Latest Ref Rng & Units 2/22/2018 1/31/2018   Hep A Ab, Total Negative     Hep B Surface Ag Index     Hep B Surface Ag Interp NEG       Hep B Core Ab, Total Negative     Hep B Surface AB QL      Hep C Ab 0.0 - 0.9 s/co ratio     Hep C Ab NR       Ferritin 30 - 400 ng/mL 659 (H) 781 (H)   Iron % Saturation 15 - 55 %     ANSLEY Ab, Direct Negative     C-ANCA Neg:<1:20 titer     P-ANCA Neg:<1:20 titer     ANCA Neg:<1:20 titer     ASMCA 0 - 19 Units     M2 Ab 0.0 - 20.0 Units     Ceruloplasmin 16.0 - 31.0 mg/dL       Serologies Latest Ref Rng & Units 12/29/2017 8/24/2017   Hep A Ab, Total Negative Negative    Hep B Surface Ag Index  0.23   Hep B Surface Ag Interp NEG    NEGATIVE   Hep B Core Ab, Total Negative Negative    Hep B Surface AB QL  Non Reactive    Hep C Ab 0.0 - 0.9 s/co ratio <0.1    Hep C Ab NR    NONREACTIVE   Ferritin 30 - 400 ng/mL 833 (H)    Iron % Saturation 15 - 55 % 22    ANSLEY Ab, Direct Negative Negative    C-ANCA Neg:<1:20 titer <1:20    P-ANCA Neg:<1:20 titer <1:20    ANCA Neg:<1:20 titer <1:20    ASMCA 0 - 19 Units 19    M2 Ab 0.0 - 20.0 Units 31.0 (H)    Ceruloplasmin 16.0 - 31.0 mg/dL 20.2      LIVER HISTOLOGY:  1/2018. [facility]. EkPa was 22.8. IQR/med 16%. The results suggested a fibrosis level of F4. CAP is 242, not consistent with fatty liver disease. ENDOSCOPIC PROCEDURES:    10/2017. Reports of an EGD without varices.  The patient brought in discharge paperwork which includes physician findings. No mention of varices. Repeat 10/2019.       10/2017: Colonoscopy with Dr. Mary Balderas. Normal.     RADIOLOGY:  11/19/2018. Abdominal ultrasound. Does not show cirrhotic morphology. No liver masses or ductal dilatation. 3/14/2018. Abdominal ultrasound. Normal hepatic echotexture with no focal mass or lesion in the liver. 8/2017. CT scan abdomen with IV contrast. Normal appearing liver. No liver mass lesions. Normal spleen. Moderate ascites. OTHER TESTING:  Not available or performed    ASSESSMENT AND PLAN:  Liver disease with cirrhosis. The etiology of the liver disease is most likely secondary to alcohol. Serologic testing for causes of chronic liver disease were ordered. All were negative. There is an elevation in ferritin with normal iron saturation. It is unlikely the patient has hemochromatosis or is a carrier for an HFE gene. The elevation in ferritin is secondary to alcohol use and is gradually declining with abstinence. It is continuing to decline. Recheck this at next visit. Liver transaminases are normal. Alkaline phosphatase is normal. Liver function is normal. Total bilirubin is normal. Serum albumin is normal. The platelet count is depressed. Labs have been stable, so will not repeat them today. The patient has normal liver function. The CTP is 5. Child class A. The MELD score is 10. The patient does not require liver transplant evaluation at this time. If he remains abstinent from alcohol, he will likely never require a liver transplant. Ascites has resolved without the need for diuretics. Lower extremity edema has not developed. Last upper endoscopy to assess for varices was performed in 10/2017. Next screening EGD will be in 10/2019. Hepatic encephalopathy has not developed to date. There is no need for treatment with lactulose and/or Xifaxan at this time.  There is no need to restrict dietary protein. The patient was directed to continue all current medications at the current dosages. There are no contraindications for the patient to take any medications necessary for treatment of other medical issues. The patient was advised to continue to remain abstinent of all alcohol. Thrombocytopenia secondary to cirrhosis. There is no evidence of overt bleeding. Anemia has resolved. The risk of osteoporosis is increased in patients with cirrhosis. DEXA bone density to assess for osteoporosis has not been performed. Vaccination for viral hepatitis A and B is recommended since the patient has no serologic evidence of previous exposure or vaccination with immunity. [facility] screening has recently been performed and does not suggest [facility] Next due for both in 5/2019. These will be ordered at next visit. All of the above issues were discussed with the patient. All questions were answered. The patient expressed a clear understanding of the above. [facility] 87 in 3 months to repeat FibroScan and follow up.      Esthela Ash, [facility]  Liver [place] of [facility] [address] [place], [address] [address]  22. [phone]

## 2019-06-19 ENCOUNTER — OFFICE VISIT (OUTPATIENT)
Dept: HEMATOLOGY | Age: 49
End: 2019-06-19

## 2019-06-19 VITALS
DIASTOLIC BLOOD PRESSURE: 77 MMHG | HEIGHT: 66 IN | SYSTOLIC BLOOD PRESSURE: 117 MMHG | OXYGEN SATURATION: 99 % | TEMPERATURE: 97.7 F | BODY MASS INDEX: 23.88 KG/M2 | WEIGHT: 148.6 LBS | HEART RATE: 76 BPM

## 2019-06-19 DIAGNOSIS — K70.30 ALCOHOLIC CIRRHOSIS OF LIVER WITHOUT ASCITES (HCC): Primary | ICD-10-CM

## 2019-06-19 NOTE — PROGRESS NOTES
[address] [place] [address], MD, MD Izabela Zendejas PA-C Bennet Colace, ACN-BC     April S Burt, [facility]   TAVON Mcdowell NP [address] [facility] [address]    at [address], [address] [place] [address], [facility] 22.    [phone]    FAX: [address] [place] [address], [address]    [phone]    FAX: [phone]       Patient Care Team:  None as PCP - General  Tosha Masterson MD (Gastroenterology)  Maicol Cronin MD as Physician (Hematology and Oncology)      Problem List  Date Reviewed: 6/29/2019          Codes Class Noted    Cirrhosis ([facility] ICD-10-CM: K74.60  ICD-9-CM: 571.5  1/31/2018        Elevated liver enzymes ICD-10-CM: R74.8  ICD-9-CM: 790.5  12/31/2017        Anemia ICD-10-CM: D64.9  ICD-9-CM: 285.9  10/11/2017        Lytic bone lesion of hip ICD-10-CM: M89.8X5  ICD-9-CM: 733.99  9/16/2017        Clostridium difficile diarrhea ICD-10-CM: A04.72  ICD-9-CM: 008.45  8/24/2017        Thrombocytopenia ([facility] ICD-10-CM: D69.6  ICD-9-CM: 287.5  8/24/2017              Senthil Vargasmirian III \"Rajinder\" returns to the [address] for management of 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 The active problem list, all pertinent past medical history, medications, liver histology, endoscopic studies, radiologic findings and laboratory findings related to the liver disorder were reviewed with the patient. The patient is a 51 y.o. Black male who was first found to have chronic liver disease and cirrhosis due to alcohol in 2017 when he was hospitalized for sepsis. He has remained abstinent from alcohol since 8/2017.      Assessment of liver fibrosis was performed in the office today with Fibroscan. The result was 8.7 kPa which correlates with stage 2 septal fibrosis. The patient has no symptoms which can be attributed to the liver disorder. The patient has not experienced fatigue, pain in the right side over the liver,     The patient completes all daily activities without any functional limitations. ASSESSMENT AND PLAN:  Alcohol liver disease  The patient was thought to have cirrhosis   The diagnosis of cirrhosis was based upon elastography, imaging, laboratory studies, a history of consuming alcohol in excess. The histologic severity has not been defined. Elastography performed in 1/2918 suggested Cirrhosis. Elastography performed in 6/2019 suggested only stage 2 fibrosis. Liver transaminases are normal.  ALP is normal.  Liver function is normal.  The platelet count is normal.      Based upon laboratory studies Fibroscan, and imaging the patient does not appear to have advanced liver disease. In retrospect the patient probably had alcohol induced liver injury/hepatitis and hepatic decompensation due to sepsis in 2017. That increased liver stiffness and this has gradually resolved with abstinence over 2 years. Will repeat Fibroscan angain in 1 year. If the liver stiffness continues to declinee and approaches normal then no further follow-up would be required. Elevation in Ferritin  There is an elevation in ferritin with normal iron saturation. It is unlikely that the patient has hemochromatosis or is a carrier for an HFE gene. Will order HFE genetic testing. The elevation in ferritin reflects hepatic inflammation and is secondary to previous alcohol use. Positive serology for AMA  This suggests that there the may be an underlying autoimmune liver disease. Given that the liver enzymes are normal it is unlikely there is an autoimmune liver disorder.     Will continue to monitor to see if the liver enzymes remain normal, fluctuate or become persistently elevated. Treatment of other medical problems in patients with chronic liver disease  There are no contraindications for the patient to take most medications that are necessary for treatment of other medical issues. The patient has alcohol induced liver disease but has been abstinent from alcohol for greater than 6 months. Normal doses of acetaminophen, as recommended on the label of the bottle, can be utilized. Counseling for alcohol in patients with chronic liver disease  The patient was counseled regarding alcohol consumption and the effect of alcohol on chronic liver disease. The patient has not consumed alcohol since 8/2017. Vaccinations   Vaccination for viral hepatitis A and B is recommended since the patient has no serologic evidence of previous exposure or vaccination with immunity. Routine vaccinations against other bacterial and viral agents can be performed as indicated. Annual flu vaccination should be administered if indicated. ALLERGIES  Allergies   Allergen Reactions    Aspirin Rash     MEDICATIONS  Current Outpatient Medications   Medication Sig    B.infantis-B.ani-B.long-B.bifi (PROBIOTIC 4X) 10-15 mg TbEC Take  by mouth.  therapeutic multivitamin (THERA) tablet Take 1 Tab by mouth daily. No current facility-administered medications for this visit. SYSTEM REVIEW NOT RELATED TO LIVER DISEASE OR REVIEWED ABOVE:  Constitution systems: Negative for fever, chills, weight gain, weight loss. Eyes: Negative for visual changes. ENT: Negative for sore throat, painful swallowing. Respiratory: Negative for cough, hemoptysis, SOB. Cardiology: Negative for chest pain, palpitations. GI:  Negative for constipation or diarrhea. : Negative for urinary frequency, dysuria, hematuria, nocturia. Skin: Negative for rash. Hematology: Negative for easy bruising, blood clots. Musculo-skeletal: Negative for back pain, muscle pain, weakness.   Neurologic: Negative for headaches, dizziness, vertigo, memory problems not related to HE. Psychology: Negative for anxiety, depression. FAMILY HISTORY:  The father  of prostate cancer. The mother is alive and healthy. There is no family history of liver disease. SOCIAL HISTORY:  The patient is . The patient has 2 children. The patient has never used tobacco products. The patient has previously consumed alcohol in excess. The patient has been abstinent from alcohol since 2017. The patient currently works full time for a construction company. PHYSICAL EXAMINATION:  Visit Vitals  /77 (BP 1 Location: Left arm, BP Patient Position: Sitting)   Pulse 76   Temp 97.7 °F (36.5 °C) (Tympanic)   Ht 5' 6\" (1.676 m)   Wt 148 lb 9.6 oz (67.4 kg)   SpO2 99%   BMI 23.98 kg/m²     General: No acute distress. Eyes: Sclera anicteric. ENT: No oral lesions. Nodes: No adenopathy. Skin: No spider angiomata. No jaundice. No palmar erythema. Respiratory: Lungs clear to auscultation. Cardiovascular: Regular heart rate. No murmurs. No JVD. Abdomen: Soft non-tender. Liver size normal to percussion/palpation. Spleen not palpable. No obvious ascites. Extremities: No edema. No muscle wasting. No gross arthritic changes. Neurologic: Alert and oriented. Cranial nerves grossly intact. No asterixis.     LABORATORY STUDIES:  Liver [place] of [address] Units 2018   WBC 3.4 - 10.8 x10E3/uL 3.6 4.1   ANC 1.4 - 7.0 x10E3/uL 1.6    HGB 13.0 - 17.7 g/dL 13.9 14.2    - 450 x10E3/uL 166 156   INR 0.8 - 1.2     AST 0 - 40 IU/L 23 30   ALT 0 - 44 IU/L 17 32   Alk Phos 39 - 117 IU/L 75 98   Bili, Total 0.0 - 1.2 mg/dL 0.8 0.8   Bili, Direct 0.00 - 0.40 mg/dL 0.20 0.18   Albumin 3.5 - 5.5 g/dL 4.6 4.7   BUN 6 - 24 mg/dL 16 11   Creat 0.76 - 1.27 mg/dL 1.39 (H) 1.28 (H)   Na 134 - 144 mmol/L 144 141   K 3.5 - 5.2 mmol/L 4.1 4.7   Cl 96 - 106 mmol/L 105 102   CO2 20 - 29 mmol/L 26 28   Glucose 65 - 99 mg/dL 92 87     SEROLOGIES:  Serologies Latest Ref Rng & Units 12/29/2017 8/24/2017   Hep A Ab, Total Negative Negative    Hep B Surface Ag Index  0.23   Hep B Surface Ag Interp NEG    NEGATIVE   Hep B Core Ab, Total Negative Negative    Hep B Surface AB QL  Non Reactive    Hep C Ab 0.0 - 0.9 s/co ratio <0.1    Hep C Ab NR    NONREACTIVE   ANSLEY Ab, Direct Negative Negative    C-ANCA Neg:<1:20 titer <1:20    P-ANCA Neg:<1:20 titer <1:20    ANCA Neg:<1:20 titer <1:20    ASMCA 0 - 19 Units 19    M2 Ab 0.0 - 20.0 Units 31.0 (H)    Ceruloplasmin 16.0 - 31.0 mg/dL 20.2      Serologies Latest Ref Rng & Units 2/22/2018 1/31/2018 12/29/2017   Ferritin 30 - 400 ng/mL 659 (H) 781 (H) 833 (H)   Iron % Saturation 15 - 55 %   22     LIVER HISTOLOGY:  1/2018. FibroScan performed at [address]. EkPa was 22.8. IQR/med 16%. The results suggested a fibrosis level of F4. CAP is 242, not consistent with fatty liver disease. 6/2019. FibroScan performed at [address]. EkPa was 8.7. IQR/med 10%. . The results suggested a fibrosis level of F2-3. The CAP score suggests no evidence of fatty liver. ENDOSCOPIC PROCEDURES:  10/2017. Reports of an EGD without varices. The patient brought in discharge paperwork which includes physician findings. No mention of varices. 10/2017: Colonoscopy with Dr. Sapphire Muse. Normal.     RADIOLOGY:  8/2017. CT scan abdomen with IV contrast. Normal appearing liver. No liver mass lesions. Normal spleen. Moderate ascites. 3/14/2018. Abdominal ultrasound. Normal hepatic echotexture with no focal mass or lesion in the liver. 11/19/2018. Abdominal ultrasound. Normal appeairng liver. No liver masses. No ascites. 11/2018. Ultrasound of liver. Echogenic consistent with chronic liver disease. No liver mass lesions. No dilated bile ducts. No ascites.     OTHER TESTING:  Not available or performed    FOLLOW-UP:  All of the issues listed above in the Assessment and Plan were discussed with the patient. All questions were answered. The patient expressed a clear understanding of the above. [address] [place] [address] in 6 months for routine monitoring.     Alexander Sy MD  [address] 22.  [phone]  [address]

## 2019-06-19 NOTE — PROGRESS NOTES
Chief Complaint   Patient presents with    Follow-up     fibroscan     Visit Vitals  /77 (BP 1 Location: Left arm, BP Patient Position: Sitting)   Pulse 76   Temp 97.7 °F (36.5 °C) (Tympanic)   Ht 5' 6\" (1.676 m)   Wt 148 lb 9.6 oz (67.4 kg)   SpO2 99%   BMI 23.98 kg/m²     3 most recent PHQ Screens 6/19/2019   Little interest or pleasure in doing things Not at all   Feeling down, depressed, irritable, or hopeless Not at all   Total Score PHQ 2 0     Abuse Screening Questionnaire 3/25/2019   Do you ever feel afraid of your partner? N   Are you in a relationship with someone who physically or mentally threatens you? N   Is it safe for you to go home? Y     1. Have you been to the ER, urgent care clinic since your last visit? Hospitalized since your last visit? No    2. Have you seen or consulted any other health care providers outside of the [address] [place] [address] since your last visit? Include any pap smears or colon screening.  No

## 2019-06-20 LAB
ALBUMIN SERPL-MCNC: 4.6 G/DL (ref 3.5–5.5)
ALP SERPL-CCNC: 75 IU/L (ref 39–117)
ALT SERPL-CCNC: 17 IU/L (ref 0–44)
AST SERPL-CCNC: 23 IU/L (ref 0–40)
BASOPHILS # BLD AUTO: 0 X10E3/UL (ref 0–0.2)
BASOPHILS NFR BLD AUTO: 0 %
BILIRUB DIRECT SERPL-MCNC: 0.2 MG/DL (ref 0–0.4)
BILIRUB SERPL-MCNC: 0.8 MG/DL (ref 0–1.2)
BUN SERPL-MCNC: 16 MG/DL (ref 6–24)
BUN/CREAT SERPL: 12 (ref 9–20)
CALCIUM SERPL-MCNC: 9.4 MG/DL (ref 8.7–10.2)
CHLORIDE SERPL-SCNC: 105 MMOL/L (ref 96–106)
CO2 SERPL-SCNC: 26 MMOL/L (ref 20–29)
CREAT SERPL-MCNC: 1.39 MG/DL (ref 0.76–1.27)
EOSINOPHIL # BLD AUTO: 0.1 X10E3/UL (ref 0–0.4)
EOSINOPHIL NFR BLD AUTO: 3 %
ERYTHROCYTE [DISTWIDTH] IN BLOOD BY AUTOMATED COUNT: 12.3 % (ref 12.3–15.4)
GLUCOSE SERPL-MCNC: 92 MG/DL (ref 65–99)
HCT VFR BLD AUTO: 40.6 % (ref 37.5–51)
HGB BLD-MCNC: 13.9 G/DL (ref 13–17.7)
IMM GRANULOCYTES # BLD AUTO: 0 X10E3/UL (ref 0–0.1)
IMM GRANULOCYTES NFR BLD AUTO: 0 %
LYMPHOCYTES # BLD AUTO: 1.7 X10E3/UL (ref 0.7–3.1)
LYMPHOCYTES NFR BLD AUTO: 46 %
MCH RBC QN AUTO: 32.4 PG (ref 26.6–33)
MCHC RBC AUTO-ENTMCNC: 34.2 G/DL (ref 31.5–35.7)
MCV RBC AUTO: 95 FL (ref 79–97)
MONOCYTES # BLD AUTO: 0.2 X10E3/UL (ref 0.1–0.9)
MONOCYTES NFR BLD AUTO: 7 %
NEUTROPHILS # BLD AUTO: 1.6 X10E3/UL (ref 1.4–7)
NEUTROPHILS NFR BLD AUTO: 44 %
PLATELET # BLD AUTO: 166 X10E3/UL (ref 150–450)
POTASSIUM SERPL-SCNC: 4.1 MMOL/L (ref 3.5–5.2)
PROT SERPL-MCNC: 7.1 G/DL (ref 6–8.5)
RBC # BLD AUTO: 4.29 X10E6/UL (ref 4.14–5.8)
SODIUM SERPL-SCNC: 144 MMOL/L (ref 134–144)
WBC # BLD AUTO: 3.6 X10E3/UL (ref 3.4–10.8)

## 2019-06-29 PROBLEM — K74.60 CIRRHOSIS (HCC): Status: ACTIVE | Noted: 2018-01-31

## 2019-07-13 ENCOUNTER — HOSPITAL ENCOUNTER (OUTPATIENT)
Dept: ULTRASOUND IMAGING | Age: 49
Discharge: HOME | End: 2019-07-13
Attending: NURSE PRACTITIONER
Payer: SELF-PAY

## 2019-07-13 DIAGNOSIS — K70.30 ALCOHOLIC CIRRHOSIS OF LIVER WITHOUT ASCITES (HCC): ICD-10-CM

## 2019-07-13 PROCEDURE — 76700 US EXAM ABDOM COMPLETE: CPT

## 2019-12-20 ENCOUNTER — OFFICE VISIT (OUTPATIENT)
Dept: HEMATOLOGY | Age: 49
End: 2019-12-20

## 2019-12-20 VITALS
HEIGHT: 66 IN | DIASTOLIC BLOOD PRESSURE: 87 MMHG | OXYGEN SATURATION: 91 % | WEIGHT: 136.2 LBS | HEART RATE: 61 BPM | TEMPERATURE: 97.7 F | SYSTOLIC BLOOD PRESSURE: 133 MMHG | BODY MASS INDEX: 21.89 KG/M2

## 2019-12-20 DIAGNOSIS — K70.30 ALCOHOLIC CIRRHOSIS OF LIVER WITHOUT ASCITES (HCC): Primary | ICD-10-CM

## 2019-12-20 DIAGNOSIS — R79.89 ELEVATED SERUM CREATININE: ICD-10-CM

## 2019-12-20 PROBLEM — A04.72 CLOSTRIDIUM DIFFICILE DIARRHEA: Status: RESOLVED | Noted: 2017-08-24 | Resolved: 2019-12-20

## 2019-12-20 NOTE — PROGRESS NOTES
[address] [place] [address], MD, MD Ileana Hatfield, MIGUEL Anguiano, [facility]     Fabiola [facility]   TAVON Cedillo NP [address] [facility] Tom StrattonCaro 136    at [address], [address] [place] [address], [facility] 22.    [phone]    FAX: [address] [place] [address], [address]    [phone]    FAX: [phone]       Patient Care Team:  None as PCP - General  Em Castellanos MD (Gastroenterology)  Paulette Burroughs MD as Physician (Hematology and Oncology)      Problem List  Date Reviewed: 12/20/2019          Codes Class Noted    Elevated serum creatinine ICD-10-CM: R79.89  ICD-9-CM: 790.99  12/20/2019        Cirrhosis ([facility] ICD-10-CM: K74.60  ICD-9-CM: 571.5  1/31/2018        Elevated liver enzymes ICD-10-CM: R74.8  ICD-9-CM: 790.5  12/31/2017        Anemia ICD-10-CM: D64.9  ICD-9-CM: 285.9  10/11/2017        Lytic bone lesion of hip ICD-10-CM: M89.8X5  ICD-9-CM: 733.99  9/16/2017        Thrombocytopenia ([facility] ICD-10-CM: D69.6  ICD-9-CM: 287.5  8/24/2017              Alma Ache III \"Rajinder\" returns to the [address] for management of 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 The active problem list, all pertinent past medical history, medications, liver histology, endoscopic studies, radiologic findings and laboratory findings related to the liver disorder were reviewed with the patient. The patient is a 51 y.o. Black male who was first found to have chronic liver disease and cirrhosis due to alcohol in 2017 when he was hospitalized for sepsis. He has remained abstinent from alcohol since 8/2017.      Assessment of liver fibrosis was performed with Fibroscan 6/2019. The result was 8.7 kPa which correlates with stage 2 septal fibrosis. The patient has no symptoms which can be attributed to the liver disorder. The patient has not experienced fatigue, or pain in the right side over the liver. The patient completes all daily activities without any functional limitations. ASSESSMENT AND PLAN:  Alcohol liver disease  The patient was thought to have cirrhosis   The diagnosis of cirrhosis was based upon elastography, imaging, laboratory studies, a history of consuming alcohol in excess. Elastography performed in 1/2918 suggested Cirrhosis. Elastography performed in 6/2019 suggested only stage 2 fibrosis. Liver transaminases are normal.  ALP is normal.  Liver function is normal.  The platelet count is normal.      In retrospect the patient probably had alcohol induced liver injury/hepatitis and hepatic decompensation due to sepsis in 2017. That increased liver stiffness and this has gradually resolved with abstinence over 2 years. Will repeat Fibroscan angain in 1 year. If the liver stiffness continues to declinee and approaches normal then no further follow-up would be required. Elevated Serum Creatinine  Discussed mild elevation in Sr. Creat 1.39. Advised he increase water intake. We will monitor closely. Elevation in Ferritin  There is an elevation in ferritin with normal iron saturation. It is unlikely that the patient has hemochromatosis or is a carrier for an HFE gene. Will order HFE genetic testing. The elevation in ferritin reflects hepatic inflammation and is secondary to previous alcohol use. Positive serology for AMA  This suggests that there the may be an underlying autoimmune liver disease. Given that the liver enzymes are normal it is unlikely there is an autoimmune liver disorder.     Will continue to monitor to see if the liver enzymes remain normal, fluctuate or become persistently elevated. Treatment of other medical problems in patients with chronic liver disease  There are no contraindications for the patient to take most medications that are necessary for treatment of other medical issues. The patient has alcohol induced liver disease but has been abstinent from alcohol for greater than 6 months. Normal doses of acetaminophen, as recommended on the label of the bottle, can be utilized. Counseling for alcohol in patients with chronic liver disease  The patient was counseled regarding alcohol consumption and the effect of alcohol on chronic liver disease. The patient has not consumed alcohol since 8/2017. Vaccinations   Vaccination for viral hepatitis A and B is recommended since the patient has no serologic evidence of previous exposure or vaccination with immunity. Routine vaccinations against other bacterial and viral agents can be performed as indicated. Annual flu vaccination should be administered if indicated. ALLERGIES  Allergies   Allergen Reactions    Aspirin Rash     MEDICATIONS  Current Outpatient Medications   Medication Sig    B.infantis-B.ani-B.long-B.bifi (PROBIOTIC 4X) 10-15 mg TbEC Take  by mouth.  therapeutic multivitamin (THERA) tablet Take 1 Tab by mouth daily. No current facility-administered medications for this visit. SYSTEM REVIEW NOT RELATED TO LIVER DISEASE OR REVIEWED ABOVE:  Constitution systems: Negative for fever, chills, weight gain, weight loss. Eyes: Negative for visual changes. ENT: Negative for sore throat, painful swallowing. Respiratory: Negative for cough, hemoptysis, SOB. Cardiology: Negative for chest pain, palpitations. GI:  Negative for constipation or diarrhea. : Negative for urinary frequency, dysuria, hematuria, nocturia. Skin: Negative for rash. Hematology: Negative for easy bruising, blood clots. Musculo-skeletal: Negative for back pain, muscle pain, weakness.   Neurologic: Negative for headaches, dizziness, vertigo, memory problems not related to HE. Psychology: Negative for anxiety, depression. FAMILY HISTORY:  The father  of prostate cancer. The mother is alive and healthy. There is no family history of liver disease. SOCIAL HISTORY:  The patient is . The patient has 2 children. The patient has never used tobacco products. The patient has previously consumed alcohol in excess. The patient has been abstinent from alcohol since 2017. The patient currently works full time for a construction company. PHYSICAL EXAMINATION:  Visit Vitals  /87 (BP 1 Location: Left arm, BP Patient Position: Sitting)   Pulse 61   Temp 97.7 °F (36.5 °C) (Tympanic)   Ht 5' 6\" (1.676 m)   Wt 136 lb 3.2 oz (61.8 kg)   SpO2 91%   BMI 21.98 kg/m²     General: No acute distress. Eyes: Sclera anicteric. ENT: No oral lesions. Nodes: No adenopathy. Skin: No spider angiomata. No jaundice. No palmar erythema. Respiratory: Lungs clear to auscultation. Cardiovascular: Regular heart rate. No murmurs. No JVD. Abdomen: Soft non-tender. Liver size normal to percussion/palpation. Spleen not palpable. No obvious ascites. Extremities: No edema. No muscle wasting. No gross arthritic changes. Neurologic: Alert and oriented. Cranial nerves grossly intact. No asterixis.     LABORATORY STUDIES:  Liver [place] of [address] Units 2018   WBC 3.4 - 10.8 x10E3/uL 3.6 4.1   ANC 1.4 - 7.0 x10E3/uL 1.6    HGB 13.0 - 17.7 g/dL 13.9 14.2    - 450 x10E3/uL 166 156   INR 0.8 - 1.2     AST 0 - 40 IU/L 23 30   ALT 0 - 44 IU/L 17 32   Alk Phos 39 - 117 IU/L 75 98   Bili, Total 0.0 - 1.2 mg/dL 0.8 0.8   Bili, Direct 0.00 - 0.40 mg/dL 0.20 0.18   Albumin 3.5 - 5.5 g/dL 4.6 4.7   BUN 6 - 24 mg/dL 16 11   Creat 0.76 - 1.27 mg/dL 1.39 (H) 1.28 (H)   Na 134 - 144 mmol/L 144 141   K 3.5 - 5.2 mmol/L 4.1 4.7   Cl 96 - 106 mmol/L 105 102   CO2 20 - 29 mmol/L 26 28 Glucose 65 - 99 mg/dL 92 87     Repeat testing done today. Will follow up when available. SEROLOGIES:  Serologies Latest Ref Rng & Units 12/29/2017 8/24/2017   Hep A Ab, Total Negative Negative    Hep B Surface Ag Index  0.23   Hep B Surface Ag Interp NEG    NEGATIVE   Hep B Core Ab, Total Negative Negative    Hep B Surface AB QL  Non Reactive    Hep C Ab 0.0 - 0.9 s/co ratio <0.1    Hep C Ab NR    NONREACTIVE   ANSLEY Ab, Direct Negative Negative    C-ANCA Neg:<1:20 titer <1:20    P-ANCA Neg:<1:20 titer <1:20    ANCA Neg:<1:20 titer <1:20    ASMCA 0 - 19 Units 19    M2 Ab 0.0 - 20.0 Units 31.0 (H)    Ceruloplasmin 16.0 - 31.0 mg/dL 20.2      Serologies Latest Ref Rng & Units 2/22/2018 1/31/2018 12/29/2017   Ferritin 30 - 400 ng/mL 659 (H) 781 (H) 833 (H)   Iron % Saturation 15 - 55 %   22     LIVER HISTOLOGY:  1/2018. FibroScan performed at [address]. EkPa was 22.8. IQR/med 16%. The results suggested a fibrosis level of F4. CAP is 242, not consistent with fatty liver disease. 6/2019. FibroScan performed at [address]. EkPa was 8.7. IQR/med 10%. . The results suggested a fibrosis level of F2-3. The CAP score suggests no evidence of fatty liver. ENDOSCOPIC PROCEDURES:  10/2017. Reports of an EGD without varices. The patient brought in discharge paperwork which includes physician findings. No mention of varices. 10/2017: Colonoscopy with Dr. Asia Alaniz. Normal.     RADIOLOGY:  8/2017. CT scan abdomen with IV contrast. Normal appearing liver. No liver mass lesions. Normal spleen. Moderate ascites. 3/14/2018. Abdominal ultrasound. Normal hepatic echotexture with no focal mass or lesion in the liver. 11/19/2018. Abdominal ultrasound. Normal appeairng liver. No liver masses. No ascites. 11/2018. Ultrasound of liver. Echogenic consistent with chronic liver disease. No liver mass lesions. No dilated bile ducts. No ascites. 7/2019. Ultrasound of liver. Echogenic consistent with chronic liver disease. No liver mass or lesions. No dilated ducts. No ascites. OTHER TESTING:  Not available or performed    FOLLOW-UP:  All of the issues listed above in the Assessment and Plan were discussed with the patient. All questions were answered. The patient expressed a clear understanding of the above. [address] [address] in 6 months for routine monitoring. Imaging scheduled to be done in the next 1-2 months. April SSIN MaderaPCNP-BC  [facility] of [address] [facility] 77 [address] Rolando Pope, [address] [place] [address] 22.  [address] [facility]

## 2019-12-20 NOTE — PROGRESS NOTES
Josué Anderson is a 51 y.o. male  Chief Complaint   Patient presents with    Follow-up         Visit Vitals  /87 (BP 1 Location: Left arm, BP Patient Position: Sitting)   Pulse 61   Temp 97.7 °F (36.5 °C) (Tympanic)   Ht 5' 6\" (1.676 m)   Wt 136 lb 3.2 oz (61.8 kg)   SpO2 91%   BMI 21.98 kg/m²     3 most recent PHQ Screens 6/19/2019   Little interest or pleasure in doing things Not at all   Feeling down, depressed, irritable, or hopeless Not at all   Total Score PHQ 2 0     Learning Assessment 3/25/2019   PRIMARY LEARNER Patient   BARRIERS PRIMARY LEARNER NONE   CO-LEARNER CAREGIVER No   PRIMARY LANGUAGE ENGLISH   LEARNER PREFERENCE PRIMARY LISTENING   ANSWERED BY patient   RELATIONSHIP SELF     Abuse Screening Questionnaire 3/25/2019   Do you ever feel afraid of your partner? N   Are you in a relationship with someone who physically or mentally threatens you? N   Is it safe for you to go home? Y         1. Have you been to the ER, urgent care clinic since your last visit? Hospitalized since your last visit? No    2. Have you seen or consulted any other health care providers outside of the [address] [place] [address] since your last visit? Include any pap smears or colon screening.  No

## 2019-12-21 LAB
ALBUMIN SERPL-MCNC: 4.5 G/DL (ref 3.5–5.5)
ALP SERPL-CCNC: 74 IU/L (ref 39–117)
ALT SERPL-CCNC: 22 IU/L (ref 0–44)
AST SERPL-CCNC: 24 IU/L (ref 0–40)
BASOPHILS # BLD AUTO: 0 X10E3/UL (ref 0–0.2)
BASOPHILS NFR BLD AUTO: 0 %
BILIRUB DIRECT SERPL-MCNC: 0.25 MG/DL (ref 0–0.4)
BILIRUB SERPL-MCNC: 0.9 MG/DL (ref 0–1.2)
BUN SERPL-MCNC: 17 MG/DL (ref 6–24)
BUN/CREAT SERPL: 13 (ref 9–20)
CALCIUM SERPL-MCNC: 9.5 MG/DL (ref 8.7–10.2)
CHLORIDE SERPL-SCNC: 101 MMOL/L (ref 96–106)
CO2 SERPL-SCNC: 25 MMOL/L (ref 20–29)
CREAT SERPL-MCNC: 1.3 MG/DL (ref 0.76–1.27)
EOSINOPHIL # BLD AUTO: 0 X10E3/UL (ref 0–0.4)
EOSINOPHIL NFR BLD AUTO: 0 %
ERYTHROCYTE [DISTWIDTH] IN BLOOD BY AUTOMATED COUNT: 11.4 % (ref 12.3–15.4)
GLUCOSE SERPL-MCNC: 77 MG/DL (ref 65–99)
HCT VFR BLD AUTO: 39.9 % (ref 37.5–51)
HGB BLD-MCNC: 13.6 G/DL (ref 13–17.7)
IMM GRANULOCYTES # BLD AUTO: 0 X10E3/UL (ref 0–0.1)
IMM GRANULOCYTES NFR BLD AUTO: 0 %
INR PPP: 1.1 (ref 0.8–1.2)
LYMPHOCYTES # BLD AUTO: 1.2 X10E3/UL (ref 0.7–3.1)
LYMPHOCYTES NFR BLD AUTO: 37 %
MCH RBC QN AUTO: 32.6 PG (ref 26.6–33)
MCHC RBC AUTO-ENTMCNC: 34.1 G/DL (ref 31.5–35.7)
MCV RBC AUTO: 96 FL (ref 79–97)
MONOCYTES # BLD AUTO: 0.2 X10E3/UL (ref 0.1–0.9)
MONOCYTES NFR BLD AUTO: 6 %
NEUTROPHILS # BLD AUTO: 1.8 X10E3/UL (ref 1.4–7)
NEUTROPHILS NFR BLD AUTO: 57 %
PLATELET # BLD AUTO: 171 X10E3/UL (ref 150–450)
POTASSIUM SERPL-SCNC: 4.6 MMOL/L (ref 3.5–5.2)
PROT SERPL-MCNC: 6.9 G/DL (ref 6–8.5)
PROTHROMBIN TIME: 11.9 SEC (ref 9.1–12)
RBC # BLD AUTO: 4.17 X10E6/UL (ref 4.14–5.8)
SODIUM SERPL-SCNC: 140 MMOL/L (ref 134–144)
WBC # BLD AUTO: 3.2 X10E3/UL (ref 3.4–10.8)

## 2019-12-23 LAB
AFP L3 MFR SERPL: NORMAL % (ref 0–9.9)
AFP SERPL-MCNC: 1.2 NG/ML (ref 0–8)

## 2019-12-23 NOTE — PROGRESS NOTES
[employer] message sent to patient with lab results which are stable. Advised to increase water intake for creatinine. We will see him back in June.

## 2020-01-25 ENCOUNTER — HOSPITAL ENCOUNTER (OUTPATIENT)
Dept: ULTRASOUND IMAGING | Age: 50
Discharge: HOME | End: 2020-01-25
Attending: NURSE PRACTITIONER
Payer: COMMERCIAL

## 2020-01-25 DIAGNOSIS — K70.30 ALCOHOLIC CIRRHOSIS OF LIVER WITHOUT ASCITES (HCC): ICD-10-CM

## 2020-01-25 PROCEDURE — 76700 US EXAM ABDOM COMPLETE: CPT

## 2020-06-23 ENCOUNTER — OFFICE VISIT (OUTPATIENT)
Dept: HEMATOLOGY | Age: 50
End: 2020-06-23

## 2020-06-23 VITALS
TEMPERATURE: 97.3 F | OXYGEN SATURATION: 96 % | HEART RATE: 80 BPM | BODY MASS INDEX: 21.86 KG/M2 | HEIGHT: 66 IN | SYSTOLIC BLOOD PRESSURE: 98 MMHG | DIASTOLIC BLOOD PRESSURE: 64 MMHG | WEIGHT: 136 LBS

## 2020-06-23 DIAGNOSIS — K70.30 ALCOHOLIC CIRRHOSIS OF LIVER WITHOUT ASCITES (HCC): Primary | ICD-10-CM

## 2020-06-23 DIAGNOSIS — R79.89 ELEVATED SERUM CREATININE: ICD-10-CM

## 2020-06-23 PROBLEM — M89.8X5 LYTIC BONE LESION OF HIP: Status: RESOLVED | Noted: 2017-09-16 | Resolved: 2020-06-23

## 2020-06-23 PROBLEM — D64.9 ANEMIA: Status: RESOLVED | Noted: 2017-10-11 | Resolved: 2020-06-23

## 2020-06-23 PROBLEM — R74.8 ELEVATED LIVER ENZYMES: Status: RESOLVED | Noted: 2017-12-31 | Resolved: 2020-06-23

## 2020-06-23 NOTE — PROGRESS NOTES
Omar Campa is a 53 y.o. male  Chief Complaint   Patient presents with    Follow-up         Visit Vitals  BP 98/64 (BP 1 Location: Left arm, BP Patient Position: Sitting)   Pulse 80   Temp 97.3 °F (36.3 °C) (Tympanic)   Ht 5' 6\" (1.676 m)   Wt 136 lb (61.7 kg)   SpO2 96%   BMI 21.95 kg/m²     3 most recent PHQ Screens 6/19/2019   Little interest or pleasure in doing things Not at all   Feeling down, depressed, irritable, or hopeless Not at all   Total Score PHQ 2 0     Learning Assessment 3/25/2019   PRIMARY LEARNER Patient   BARRIERS PRIMARY LEARNER NONE   CO-LEARNER CAREGIVER No   PRIMARY LANGUAGE ENGLISH   LEARNER PREFERENCE PRIMARY LISTENING   ANSWERED BY patient   RELATIONSHIP SELF     Abuse Screening Questionnaire 3/25/2019   Do you ever feel afraid of your partner? N   Are you in a relationship with someone who physically or mentally threatens you? N   Is it safe for you to go home? Y       Fall Risk Assessment, last 12 mths 3/25/2019   Able to walk? Yes   Fall in past 12 months? No                 1. Have you been to the ER, urgent care clinic since your last visit? Hospitalized since your last visit? no    2. Have you seen or consulted any other health care providers outside of the [address] [place] [address] since your last visit? Include any pap smears or colon screening.  no

## 2020-06-23 NOTE — PROGRESS NOTES
[address] [facility], Anay SERRANO Jamesetta Mascot, MD Rex Meres, PA-C Janina Brasil, ACNP-BC     April S Burt, [facility]-BC   Rip Councilman, NP Cindra Kallman, NP [address] [facility] 136    at [address], [address] [facility], [facility] 22.    [phone]    FAX: [address] [place] [address], [address]    [phone]    FAX: [phone]       Patient Care Team:  None as PCP - General  Nigel Kumar MD (Gastroenterology)  Fredi Travis MD as Physician (Hematology and Oncology)      Problem List  Date Reviewed: 12/20/2019          Codes Class Noted    Elevated serum creatinine ICD-10-CM: R79.89  ICD-9-CM: 790.99  12/20/2019        [facility] ICD-10-CM: K74.60  ICD-9-CM: 571.5  1/31/2018        Thrombocytopenia ([facility] ICD-10-CM: D69.6  ICD-9-CM: 287.5  8/24/2017              Brendenharvey Mauricioon III \"Rajinder\" returns to the [address] for management of 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 The active problem list, all pertinent past medical history, medications, liver histology, endoscopic studies, radiologic findings and laboratory findings related to the liver disorder were reviewed with the patient. The patient is a 53 y.o. Black male who was first found to have chronic liver disease and cirrhosis due to alcohol in 2017 when he was hospitalized for sepsis. He has remained abstinent from alcohol since 8/2017. Assessment of liver fibrosis was performed with Fibroscan 6/2019. The result was 8.7 kPa which correlates with stage 2 septal fibrosis. Since the last office visit the patient has been doing well with no hospitalizations.  He has remained abstinent from alcohol since 2017.     The patient has no symptoms which can be attributed to the liver disorder. The patient has not experienced fatigue, or pain in the right side over the liver. The patient completes all daily activities without any functional limitations. ASSESSMENT AND PLAN:  Alcohol liver disease  The patient was thought to have cirrhosis   The diagnosis of cirrhosis was based upon elastography, imaging, laboratory studies, and a history of consuming alcohol in excess. Elastography performed in 1/2918 suggested Cirrhosis. Elastography performed in 6/2019 suggested only stage 2 fibrosis. Liver transaminases are normal.  ALP is normal.  Liver function is normal.  The platelet count is normal.      In retrospect the patient probably had alcohol induced liver injury/hepatitis and hepatic decompensation due to sepsis in 2017. That increased liver stiffness and this has gradually resolved with abstinence over 2 years. Will repeat Fibroscan yearly. If the liver stiffness continues to decline and approaches normal then no further follow-up would be required. Elevated Serum Creatinine  Discussed mild elevation in Sr. Creat 1.39. Advised he increase water intake. We will monitor closely. If this continues to be elevated will refer him to nephrology for further evaluation. Elevation in Ferritin  There is an elevation in ferritin with normal iron saturation in 2018. It is unlikely that the patient has hemochromatosis or is a carrier for an HFE gene. The elevation in ferritin reflects hepatic inflammation and is secondary to previous alcohol use. Will repeat today to ensure this has normalized. Positive serology for AMA  This suggests that there the may be an underlying autoimmune liver disease. Given that the liver enzymes are normal it is unlikely there is an autoimmune liver disorder.     Will continue to monitor to see if the liver enzymes remain normal, fluctuate or become persistently elevated. Treatment of other medical problems in patients with chronic liver disease  There are no contraindications for the patient to take most medications that are necessary for treatment of other medical issues. The patient has alcohol induced liver disease but has been abstinent from alcohol for greater than 6 months. Normal doses of acetaminophen, as recommended on the label of the bottle, can be utilized. Counseling for alcohol in patients with chronic liver disease  The patient was counseled regarding alcohol consumption and the effect of alcohol on chronic liver disease. The patient has not consumed alcohol since 8/2017. Vaccinations   Vaccination for viral hepatitis A and B is recommended since the patient has no serologic evidence of previous exposure or vaccination with immunity. Routine vaccinations against other bacterial and viral agents can be performed as indicated. Annual flu vaccination should be administered if indicated. ALLERGIES  Allergies   Allergen Reactions    Aspirin Rash     MEDICATIONS  Current Outpatient Medications   Medication Sig    B.infantis-B.ani-B.long-B.bifi (PROBIOTIC 4X) 10-15 mg TbEC Take  by mouth.  therapeutic multivitamin (THERA) tablet Take 1 Tab by mouth daily. No current facility-administered medications for this visit. SYSTEM REVIEW NOT RELATED TO LIVER DISEASE OR REVIEWED ABOVE:  Constitution systems: Negative for fever, chills, weight gain, weight loss. Eyes: Negative for visual changes. ENT: Negative for sore throat, painful swallowing. Respiratory: Negative for cough, hemoptysis, SOB. Cardiology: Negative for chest pain, palpitations. GI:  Negative for constipation or diarrhea. : Negative for urinary frequency, dysuria, hematuria, nocturia. Skin: Negative for rash. Hematology: Negative for easy bruising, blood clots. Musculo-skeletal: Negative for back pain, muscle pain, weakness.   Neurologic: Negative for headaches, dizziness, vertigo, memory problems not related to HE. Psychology: Negative for anxiety, depression. FAMILY HISTORY:  The father  of prostate cancer. The mother is alive and healthy. There is no family history of liver disease. SOCIAL HISTORY:  The patient is . The patient has 2 children. The patient has never used tobacco products. The patient has previously consumed alcohol in excess. The patient has been abstinent from alcohol since 2017. The patient currently works full time for a construction company. PHYSICAL EXAMINATION:  Visit Vitals  BP 98/64 (BP 1 Location: Left arm, BP Patient Position: Sitting)   Pulse 80   Temp 97.3 °F (36.3 °C) (Tympanic)   Ht 5' 6\" (1.676 m)   Wt 136 lb (61.7 kg)   SpO2 96%   BMI 21.95 kg/m²     General: No acute distress. Eyes: Sclera anicteric. ENT: No oral lesions. Nodes: No adenopathy. Skin: No spider angiomata. No jaundice. No palmar erythema. Respiratory: Lungs clear to auscultation. Cardiovascular: Regular heart rate. No murmurs. No JVD. Abdomen: Soft non-tender. Liver size normal to percussion/palpation. Spleen not palpable. No obvious ascites. Extremities: No edema. No muscle wasting. No gross arthritic changes. Neurologic: Alert and oriented. Cranial nerves grossly intact. No asterixis.     LABORATORY STUDIES:  Liver [place] of [address] [place] [address] 2019   WBC 3.4 - 10.8 x10E3/uL 3.2 (L) 3.6   ANC 1.4 - 7.0 x10E3/uL 1.8 1.6   HGB 13.0 - 17.7 g/dL 13.6 13.9    - 450 x10E3/uL 171 166   INR 0.8 - 1.2 1.1    AST 0 - 40 IU/L 24 23   ALT 0 - 44 IU/L 22 17   Alk Phos 39 - 117 IU/L 74 75   Bili, Total 0.0 - 1.2 mg/dL 0.9 0.8   Bili, Direct 0.00 - 0.40 mg/dL 0.25 0.20   Albumin 3.5 - 5.5 g/dL 4.5 4.6   BUN 6 - 24 mg/dL 17 16   Creat 0.76 - 1.27 mg/dL 1.30 (H) 1.39 (H)   Na 134 - 144 mmol/L 140 144   K 3.5 - 5.2 mmol/L 4.6 4.1   Cl 96 - 106 mmol/L 101 105   CO2 20 - 29 mmol/L 25 26   Glucose 65 - 99 mg/dL 77 92     Liver [place] of [address] Units 11/26/2018   WBC 3.4 - 10.8 x10E3/uL 4.1   ANC 1.4 - 7.0 x10E3/uL    HGB 13.0 - 17.7 g/dL 14.2    - 450 x10E3/uL 156   INR 0.8 - 1.2    AST 0 - 40 IU/L 30   ALT 0 - 44 IU/L 32   Alk Phos 39 - 117 IU/L 98   Bili, Total 0.0 - 1.2 mg/dL 0.8   Bili, Direct 0.00 - 0.40 mg/dL 0.18   Albumin 3.5 - 5.5 g/dL 4.7   BUN 6 - 24 mg/dL 11   Creat 0.76 - 1.27 mg/dL 1.28 (H)   Na 134 - 144 mmol/L 141   K 3.5 - 5.2 mmol/L 4.7   Cl 96 - 106 mmol/L 102   CO2 20 - 29 mmol/L 28   Glucose 65 - 99 mg/dL 87     Repeat testing done today. Will follow up when available. SEROLOGIES:  Serologies Latest Ref Rng & Units 12/29/2017 8/24/2017   Hep A Ab, Total Negative Negative    Hep B Surface Ag Index  0.23   Hep B Surface Ag Interp NEG    NEGATIVE   Hep B Core Ab, Total Negative Negative    Hep B Surface AB QL  Non Reactive    Hep C Ab 0.0 - 0.9 s/co ratio <0.1    Hep C Ab NR    NONREACTIVE   ANSLEY Ab, Direct Negative Negative    C-ANCA Neg:<1:20 titer <1:20    P-ANCA Neg:<1:20 titer <1:20    ANCA Neg:<1:20 titer <1:20    ASMCA 0 - 19 Units 19    M2 Ab 0.0 - 20.0 Units 31.0 (H)    Ceruloplasmin 16.0 - 31.0 mg/dL 20.2      Serologies Latest Ref Rng & Units 2/22/2018 1/31/2018 12/29/2017   Ferritin 30 - 400 ng/mL 659 (H) 781 (H) 833 (H)   Iron % Saturation 15 - 55 %   22     Repeat testing done today. Will follow up when available. LIVER HISTOLOGY:  1/2018. [facility]. EkPa was 22.8. IQR/med 16%. The results suggested a fibrosis level of F4. CAP is 242, not consistent with fatty liver disease. 6/2019. [facility]. EkPa was 8.7. IQR/med 10%. . The results suggested a fibrosis level of F2-3. The CAP score suggests no evidence of fatty liver. ENDOSCOPIC PROCEDURES:  10/2017. Reports of an EGD without varices.  The patient brought in discharge paperwork which includes physician findings. No mention of varices. 10/2017: Colonoscopy with Dr. Tanya Richter. Normal.     RADIOLOGY:  8/2017. CT scan abdomen with IV contrast. Normal appearing liver. No liver mass lesions. Normal spleen. Moderate ascites. 3/14/2018. Abdominal ultrasound. Normal hepatic echotexture with no focal mass or lesion in the liver. 11/19/2018. Abdominal ultrasound. Normal appeairng liver. No liver masses. No ascites. 11/2018. Ultrasound of liver. Echogenic consistent with chronic liver disease. No liver mass lesions. No dilated bile ducts. No ascites. 7/2019. Ultrasound of liver. Echogenic consistent with chronic liver disease. No liver mass or lesions. No dilated ducts. No ascites. 1/2020. Ultrasound of liver. Echogenic liver compatible with hepatic steatosis. No mass or lesion. No Splenomegaly. OTHER TESTING:  Not available or performed    FOLLOW-UP:  All of the issues listed above in the Assessment and Plan were discussed with the patient. All questions were answered. The patient expressed a clear understanding of the above. [address] [address] in 6 months for a Fibroscan. Imaging scheduled to be done in the next 1-2 months. April S.  CODIE [facility] of [address] [address] Rolando Pope, [address] [facility] 22.  201 [facility]

## 2020-06-24 LAB
ALBUMIN SERPL-MCNC: 4.8 G/DL (ref 4–5)
ALP SERPL-CCNC: 75 IU/L (ref 39–117)
ALT SERPL-CCNC: 22 IU/L (ref 0–44)
AST SERPL-CCNC: 23 IU/L (ref 0–40)
BASOPHILS # BLD AUTO: 0 X10E3/UL (ref 0–0.2)
BASOPHILS NFR BLD AUTO: 0 %
BILIRUB DIRECT SERPL-MCNC: 0.21 MG/DL (ref 0–0.4)
BILIRUB SERPL-MCNC: 0.8 MG/DL (ref 0–1.2)
BUN SERPL-MCNC: 11 MG/DL (ref 6–24)
BUN/CREAT SERPL: 8 (ref 9–20)
CALCIUM SERPL-MCNC: 9.7 MG/DL (ref 8.7–10.2)
CHLORIDE SERPL-SCNC: 101 MMOL/L (ref 96–106)
CO2 SERPL-SCNC: 28 MMOL/L (ref 20–29)
CREAT SERPL-MCNC: 1.39 MG/DL (ref 0.76–1.27)
EOSINOPHIL # BLD AUTO: 0.1 X10E3/UL (ref 0–0.4)
EOSINOPHIL NFR BLD AUTO: 1 %
ERYTHROCYTE [DISTWIDTH] IN BLOOD BY AUTOMATED COUNT: 11.6 % (ref 11.6–15.4)
FERRITIN SERPL-MCNC: 614 NG/ML (ref 30–400)
GLUCOSE SERPL-MCNC: 59 MG/DL (ref 65–99)
HCT VFR BLD AUTO: 42.7 % (ref 37.5–51)
HGB BLD-MCNC: 14.6 G/DL (ref 13–17.7)
IMM GRANULOCYTES # BLD AUTO: 0 X10E3/UL (ref 0–0.1)
IMM GRANULOCYTES NFR BLD AUTO: 0 %
INR PPP: 1.1 (ref 0.8–1.2)
IRON SATN MFR SERPL: 16 % (ref 15–55)
IRON SERPL-MCNC: 48 UG/DL (ref 38–169)
LYMPHOCYTES # BLD AUTO: 1.5 X10E3/UL (ref 0.7–3.1)
LYMPHOCYTES NFR BLD AUTO: 42 %
MCH RBC QN AUTO: 32.8 PG (ref 26.6–33)
MCHC RBC AUTO-ENTMCNC: 34.2 G/DL (ref 31.5–35.7)
MCV RBC AUTO: 96 FL (ref 79–97)
MONOCYTES # BLD AUTO: 0.3 X10E3/UL (ref 0.1–0.9)
MONOCYTES NFR BLD AUTO: 7 %
NEUTROPHILS # BLD AUTO: 1.7 X10E3/UL (ref 1.4–7)
NEUTROPHILS NFR BLD AUTO: 50 %
PLATELET # BLD AUTO: 204 X10E3/UL (ref 150–450)
POTASSIUM SERPL-SCNC: 4.5 MMOL/L (ref 3.5–5.2)
PROT SERPL-MCNC: 7.1 G/DL (ref 6–8.5)
PROTHROMBIN TIME: 11.7 SEC (ref 9.1–12)
RBC # BLD AUTO: 4.45 X10E6/UL (ref 4.14–5.8)
SODIUM SERPL-SCNC: 143 MMOL/L (ref 134–144)
TIBC SERPL-MCNC: 304 UG/DL (ref 250–450)
UIBC SERPL-MCNC: 256 UG/DL (ref 111–343)
WBC # BLD AUTO: 3.6 X10E3/UL (ref 3.4–10.8)

## 2020-06-25 LAB
AFP L3 MFR SERPL: NORMAL % (ref 0–9.9)
AFP SERPL-MCNC: 1.2 NG/ML (ref 0–8)

## 2020-06-27 ENCOUNTER — HOSPITAL ENCOUNTER (OUTPATIENT)
Dept: ULTRASOUND IMAGING | Age: 50
Discharge: HOME | End: 2020-06-27
Attending: NURSE PRACTITIONER
Payer: COMMERCIAL

## 2020-06-27 DIAGNOSIS — K70.30 ALCOHOLIC CIRRHOSIS OF LIVER WITHOUT ASCITES (HCC): ICD-10-CM

## 2020-06-27 PROCEDURE — 76700 US EXAM ABDOM COMPLETE: CPT

## 2020-06-29 ENCOUNTER — TELEPHONE (OUTPATIENT)
Dept: HEMATOLOGY | Age: 50
End: 2020-06-29

## 2020-06-29 DIAGNOSIS — K70.30 ALCOHOLIC CIRRHOSIS OF LIVER WITHOUT ASCITES (HCC): Primary | ICD-10-CM

## 2020-06-29 DIAGNOSIS — N18.2 STAGE 2 CHRONIC KIDNEY DISEASE: ICD-10-CM

## 2020-06-29 DIAGNOSIS — R79.89 ELEVATED FERRITIN: ICD-10-CM

## 2020-06-29 NOTE — TELEPHONE ENCOUNTER
Called to follow up on referral to Nephrology.   Left detailed voice maiil on number listed to call office in reference to referral.  Ilene Anguiano LPN

## 2020-06-29 NOTE — PROGRESS NOTES
Letter sent to the patient regarding blood work and ultrasound results. The creatinine continues to be elevated and the ultrasound suggest there are changes that may be related to underlying kidney disease. Will refer to nephrology for further work up. The ferritin was also elevated. Will order HFE testing. It is most likely going to be negative due to normal iron sats but we will be sure.

## 2020-09-08 ENCOUNTER — TELEPHONE (OUTPATIENT)
Dept: HEMATOLOGY | Age: 50
End: 2020-09-08

## 2020-09-08 NOTE — TELEPHONE ENCOUNTER
Chief Complaint   Patient presents with    Referral Follow Up     to nephrology; pt has not yet scheduled appt as of 9/8/20         Called and spoke with pt inquiring if he has seen nephrologist (referred 6/29/20 per Fabiola Dallas NP). Pt stated he has not yet been able to schedule appt d/t not having the time off work. Pt did request referral be mailed to home address and also stated he will contact [facility] once scheduled, to provide date and time of appt.

## 2020-12-23 ENCOUNTER — OFFICE VISIT (OUTPATIENT)
Dept: HEMATOLOGY | Age: 50
End: 2020-12-23
Payer: COMMERCIAL

## 2020-12-23 VITALS
OXYGEN SATURATION: 97 % | WEIGHT: 142.6 LBS | SYSTOLIC BLOOD PRESSURE: 123 MMHG | TEMPERATURE: 96.4 F | HEIGHT: 66 IN | BODY MASS INDEX: 22.92 KG/M2 | HEART RATE: 58 BPM | RESPIRATION RATE: 18 BRPM | DIASTOLIC BLOOD PRESSURE: 71 MMHG

## 2020-12-23 DIAGNOSIS — K70.30 ALCOHOLIC CIRRHOSIS OF LIVER WITHOUT ASCITES (HCC): Primary | ICD-10-CM

## 2020-12-23 LAB
BASOPHILS # BLD AUTO: 0 X10E3/UL (ref 0–0.2)
BASOPHILS NFR BLD AUTO: 1 %
EOSINOPHIL # BLD AUTO: 0.1 X10E3/UL (ref 0–0.4)
EOSINOPHIL NFR BLD AUTO: 2 %
ERYTHROCYTE [DISTWIDTH] IN BLOOD BY AUTOMATED COUNT: 11.2 % (ref 11.6–15.4)
HCT VFR BLD AUTO: 41.9 % (ref 37.5–51)
HGB BLD-MCNC: 14.6 G/DL (ref 13–17.7)
IMM GRANULOCYTES # BLD AUTO: 0 X10E3/UL (ref 0–0.1)
IMM GRANULOCYTES NFR BLD AUTO: 0 %
LYMPHOCYTES # BLD AUTO: 1.3 X10E3/UL (ref 0.7–3.1)
LYMPHOCYTES NFR BLD AUTO: 41 %
MCH RBC QN AUTO: 33 PG (ref 26.6–33)
MCHC RBC AUTO-ENTMCNC: 34.8 G/DL (ref 31.5–35.7)
MCV RBC AUTO: 95 FL (ref 79–97)
MONOCYTES # BLD AUTO: 0.2 X10E3/UL (ref 0.1–0.9)
MONOCYTES NFR BLD AUTO: 5 %
NEUTROPHILS # BLD AUTO: 1.6 X10E3/UL (ref 1.4–7)
NEUTROPHILS NFR BLD AUTO: 51 %
PLATELET # BLD AUTO: 177 X10E3/UL (ref 150–450)
RBC # BLD AUTO: 4.42 X10E6/UL (ref 4.14–5.8)
WBC # BLD AUTO: 3 X10E3/UL (ref 3.4–10.8)

## 2020-12-23 PROCEDURE — 91200 LIVER ELASTOGRAPHY: CPT | Performed by: NURSE PRACTITIONER

## 2020-12-23 PROCEDURE — 99214 OFFICE O/P EST MOD 30 MIN: CPT | Performed by: NURSE PRACTITIONER

## 2020-12-23 NOTE — PROGRESS NOTES
RM 1    Identified pt with two pt identifiers(name and ). Reviewed record in preparation for visit and have obtained necessary documentation. Chief Complaint   Patient presents with    Cirrhosis Of Liver     Fibroscan       Vitals:    20 0944   BP: 123/71   Pulse: (!) 58   Resp: 18   Temp: (!) 96.4 °F (35.8 °C)   TempSrc: Temporal   SpO2: 97%   Weight: 142 lb 9.6 oz (64.7 kg)   Height: 5' 6\" (1.676 m)   PainSc:   0 - No pain       Health Maintenance Review: Patient reminded of \"due or due soon\" health maintenance. I have asked the patient to contact his/her primary care provider (PCP) for follow-up on his/her health maintenance. Coordination of Care Questionnaire:  :   1) Have you been to an emergency room, urgent care, or hospitalized since your last visit? If yes, where when, and reason for visit? no       2. Have seen or consulted any other health care provider since your last visit? If yes, where when, and reason for visit? NO    Advance Care Planning:   End of Life Planning: DNI/DNR    Patient is accompanied by self I have received verbal consent from Carola Curtis III to discuss any/all medical information while they are present in the room.

## 2020-12-23 NOTE — PROGRESS NOTES
[address] [facility], MD, Wiley Ortiz MD Angelo Bar, PA-C Johna Last, ACNP-BC     April S Burt, [facility]-BC   TAVON Rudd NP [address]    at [address], 81 [facility], [facility] 22.    [phone]    FAX: [address] [place] [address], [address]    [phone]    FAX: [phone]       Patient Care Team:  None as PCP - General  Bibiana Ellis MD (Gastroenterology)  Joaquim Mahmood MD as Physician (Hematology and Oncology)      Problem List  Date Reviewed: 6/23/2020          Codes Class Noted    Elevated serum creatinine ICD-10-CM: R79.89  ICD-9-CM: 790.99  12/20/2019        Cirrhosis ([facility] ICD-10-CM: K74.60  ICD-9-CM: 571.5  1/31/2018        Thrombocytopenia ([facility] ICD-10-CM: D69.6  ICD-9-CM: 287.5  8/24/2017              Helayne Hansen III \"Rajinder\" [facility] the [facility] for management of 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 The active problem list, all pertinent past medical history, medications, liver histology, endoscopic studies, radiologic findings and laboratory findings related to the liver disorder were reviewed with the patient. The patient is a 53 y.o. Black male who was first found to have chronic liver disease and cirrhosis due to alcohol in 2017 when he was hospitalized for sepsis. He has remained abstinent from alcohol since 8/2017. Assessment of liver fibrosis was performed with Fibroscan 6/2019. The result was 8.7 kPa which correlates with stage 2 septal fibrosis. Assessment of liver fibrosis was performed with Fibroscan in the office today.  The result was 6.6 kPa which correlates with no fibrosis to stage 1 portal fibrosis. Since the last office visit the patient has been doing well with no hospitalizations. He has remained abstinent from alcohol since 2017. The patient has no symptoms which can be attributed to the liver disorder. The patient has not experienced fatigue, or pain in the right side over the liver. The patient completes all daily activities without any functional limitations. ASSESSMENT AND PLAN:  Alcohol liver disease  The patient was thought to have cirrhosis   The diagnosis of cirrhosis was based upon elastography, imaging, laboratory studies, and a history of consuming alcohol in excess. Elastography performed in 1/2918 suggested Cirrhosis. Elastography performed in 6/2019 suggested only stage 2 fibrosis. Elastography performed 12/2020 suggests stage 0-1 fibrosis. This has continued to improve over time. The most recent ultrasound suggested he has cirrhosis. Advised he should be screened every 6-12 months with ultrasound and AFP. Liver transaminases are normal.  ALP is normal.  Liver function is normal.  The platelet count is normal.      In retrospect the patient probably had alcohol induced liver injury/hepatitis and hepatic decompensation due to sepsis in 2017. The ultrasound suggests cirrhosis. Will continue to follow at this time. .     Will repeat Fibroscan yearly. If the liver stiffness continues to decline and approaches normal then no further follow-up would be required. Elevated Serum Creatinine  Discussed mild elevation in Sr. Creat 1.39. Advised he increase water intake. We will monitor closely. If this continues to be elevated will refer him to nephrology for further evaluation. Elevation in Ferritin  There is an elevation in ferritin with normal iron saturation in 2018. It is unlikely that the patient has hemochromatosis or is a carrier for an HFE gene.     The elevation in ferritin reflects hepatic inflammation and is secondary to previous alcohol use. Will repeat today to ensure this has normalized. Treatment of other medical problems in patients with chronic liver disease  There are no contraindications for the patient to take most medications that are necessary for treatment of other medical issues. The patient has alcohol induced liver disease but has been abstinent from alcohol for greater than 6 months. Normal doses of acetaminophen, as recommended on the label of the bottle, can be utilized. Counseling for alcohol in patients with chronic liver disease  The patient was counseled regarding alcohol consumption and the effect of alcohol on chronic liver disease. The patient has not consumed alcohol since 8/2017. Vaccinations   Vaccination for viral hepatitis A and B is recommended since the patient has no serologic evidence of previous exposure or vaccination with immunity. Routine vaccinations against other bacterial and viral agents can be performed as indicated. Annual flu vaccination should be administered if indicated. ALLERGIES  Allergies   Allergen Reactions    Aspirin Rash     MEDICATIONS  Current Outpatient Medications   Medication Sig    B.infantis-B.ani-B.long-B.bifi (PROBIOTIC 4X) 10-15 mg TbEC Take  by mouth.  therapeutic multivitamin (THERA) tablet Take 1 Tab by mouth daily. No current facility-administered medications for this visit. SYSTEM REVIEW NOT RELATED TO LIVER DISEASE OR REVIEWED ABOVE:  Constitution systems: Negative for fever, chills, weight gain, weight loss. Eyes: Negative for visual changes. ENT: Negative for sore throat, painful swallowing. Respiratory: Negative for cough, hemoptysis, SOB. Cardiology: Negative for chest pain, palpitations. GI:  Negative for constipation or diarrhea. : Negative for urinary frequency, dysuria, hematuria, nocturia. Skin: Negative for rash. Hematology: Negative for easy bruising, blood clots.     Musculo-skeletal: Negative for back pain, muscle pain, weakness. Neurologic: Negative for headaches, dizziness, vertigo, memory problems not related to HE. Psychology: Negative for anxiety, depression. FAMILY HISTORY:  The father  of prostate cancer. The mother is alive and healthy. There is no family history of liver disease. SOCIAL HISTORY:  The patient is . The patient has 2 children. The patient has never used tobacco products. The patient has previously consumed alcohol in excess. The patient has been abstinent from alcohol since 2017. The patient currently works full time for a construction company. PHYSICAL EXAMINATION:  Visit Vitals  /71 (BP 1 Location: Right arm, BP Patient Position: Sitting)   Pulse (!) 58   Temp (!) 96.4 °F (35.8 °C) (Temporal)   Resp 18   Ht 5' 6\" (1.676 m)   Wt 142 lb 9.6 oz (64.7 kg)   SpO2 97%   BMI 23.02 kg/m²     General: No acute distress. Eyes: Sclera anicteric. ENT: No oral lesions. Nodes: No adenopathy. Skin: No spider angiomata. No jaundice. No palmar erythema. Respiratory: Lungs clear to auscultation. Cardiovascular: Regular heart rate. No murmurs. No JVD. Abdomen: Soft non-tender. Liver size normal to percussion/palpation. Spleen not palpable. No obvious ascites. Extremities: No edema. No muscle wasting. No gross arthritic changes. Neurologic: Alert and oriented. Cranial nerves grossly intact. No asterixis.     LABORATORY STUDIES:  [facility] of [address] [place] [address] Units 2020   WBC 3.4 - 10.8 x10E3/uL 3.0 (L) 3.6   ANC 1.4 - 7.0 x10E3/uL 1.6 1.7   HGB 13.0 - 17.7 g/dL 14.6 14.6    - 450 x10E3/uL 177 204   INR 0.9 - 1.2 1.1 1.1   AST 0 - 40 IU/L 26 23   ALT 0 - 44 IU/L 25 22   Alk Phos 39 - 117 IU/L 80 75   Bili, Total 0.0 - 1.2 mg/dL 0.9 0.8   Bili, Direct 0.00 - 0.40 mg/dL 0.21 0.21   Albumin 4.0 - 5.0 g/dL 4.7 4.8   BUN 6 - 24 mg/dL 15 11   Creat 0.76 - 1.27 mg/dL 1.34 (H) 1.39 (H)   Na 134 - 144 mmol/L 143 143   K 3.5 - 5.2 mmol/L 4.9 4.5   Cl 96 - 106 mmol/L 103 101   CO2 20 - 29 mmol/L 24 28   Glucose 65 - 99 mg/dL 84 59 (L)     Liver [place] of [address] [place] [address] Ref Rng & Units 12/20/2019   WBC 3.4 - 10.8 x10E3/uL 3.2 (L)   ANC 1.4 - 7.0 x10E3/uL 1.8   HGB 13.0 - 17.7 g/dL 13.6    - 450 x10E3/uL 171   INR 0.9 - 1.2 1.1   AST 0 - 40 IU/L 24   ALT 0 - 44 IU/L 22   Alk Phos 39 - 117 IU/L 74   Bili, Total 0.0 - 1.2 mg/dL 0.9   Bili, Direct 0.00 - 0.40 mg/dL 0.25   Albumin 4.0 - 5.0 g/dL 4.5   BUN 6 - 24 mg/dL 17   Creat 0.76 - 1.27 mg/dL 1.30 (H)   Na 134 - 144 mmol/L 140   K 3.5 - 5.2 mmol/L 4.6   Cl 96 - 106 mmol/L 101   CO2 20 - 29 mmol/L 25   Glucose 65 - 99 mg/dL 77     Cancer Screening Latest Ref Rng & Units 12/23/2020 6/23/2020 12/20/2019   AFP, Serum 0.0 - 8.0 ng/mL 1.0 1.2 1.2   AFP-L3% 0.0 - 9.9 % Comment Comment Comment     Repeat testing done today. Will follow up when available. SEROLOGIES:  Serologies Latest Ref Rng & Units 12/29/2017 8/24/2017   Hep A Ab, Total Negative Negative    Hep B Surface Ag Index  0.23   Hep B Surface Ag Interp NEG    NEGATIVE   Hep B Core Ab, Total Negative Negative    Hep B Surface AB QL  Non Reactive    Hep C Ab 0.0 - 0.9 s/co ratio <0.1    Hep C Ab NR    NONREACTIVE   ANSLEY Ab, Direct Negative Negative    C-ANCA Neg:<1:20 titer <1:20    P-ANCA Neg:<1:20 titer <1:20    ANCA Neg:<1:20 titer <1:20    ASMCA 0 - 19 Units 19    M2 Ab 0.0 - 20.0 Units 31.0 (H)    Ceruloplasmin 16.0 - 31.0 mg/dL 20.2      Serologies Latest Ref Rng & Units 2/22/2018 1/31/2018 12/29/2017   Ferritin 30 - 400 ng/mL 659 (H) 781 (H) 833 (H)   Iron % Saturation 15 - 55 %   22       LIVER HISTOLOGY:  1/2018. FibroScan performed at [address]. EkPa was 22.8. IQR/med 16%. The results suggested a fibrosis level of F4. CAP is 242, not consistent with fatty liver disease. 6/2019. FibroScan performed at [address]. EkPa was 8.7. IQR/med 10%. .  The results suggested a fibrosis level of F2-3. The CAP score suggests no evidence of fatty liver.  6/2020. [facility]Scan [facility] at [facility]. EkPa was 6.6. IQR/med 9%. . The results suggested a fibrosis level of F0-1. The CAP score suggests there is no significant hepatic steatosis. ENDOSCOPIC PROCEDURES:  10/2017. Reports of an EGD without varices. The patient brought in discharge paperwork which includes physician findings. No mention of varices. 10/2017: Colonoscopy with Dr. Dilia Denis. Normal.     RADIOLOGY:  8/2017. CT scan abdomen with IV contrast. Normal appearing liver. No liver mass lesions. Normal spleen. Moderate ascites. 3/14/2018. Abdominal ultrasound. Normal hepatic echotexture with no focal mass or lesion in the liver. 11/19/2018. Abdominal ultrasound. Normal appeairng liver. No liver masses. No ascites. 11/2018. Ultrasound of liver. Echogenic consistent with chronic liver disease. No liver mass lesions. No dilated bile ducts. No ascites. 7/2019. Ultrasound of liver. Echogenic consistent with chronic liver disease. No liver mass or lesions. No dilated ducts. No ascites. 1/2020. Ultrasound of liver. Echogenic liver compatible with hepatic steatosis. No mass or lesion. No Splenomegaly. 6/2020. Ultrasound of liver. Echogenic consistent with cirrhosis. No ductal dilatation. No liver lesion or mass. No ascites. OTHER TESTING:  Not available or performed    FOLLOW-UP:  All of the issues listed above in the Assessment and Plan were discussed with the patient. All questions were answered. The patient expressed a clear understanding of the above. [facility] in 6 months. Imaging scheduled to be done in the next 1-2 months. April S.  Burt, [facility] of [address] [facility] [address] [address] Rolando Pope, [address] [facility], [facility] 22.  [facility]

## 2020-12-24 LAB
AFP L3 MFR SERPL: NORMAL % (ref 0–9.9)
AFP SERPL-MCNC: 1 NG/ML (ref 0–8)
ALBUMIN SERPL-MCNC: 4.7 G/DL (ref 4–5)
ALP SERPL-CCNC: 80 IU/L (ref 39–117)
ALT SERPL-CCNC: 25 IU/L (ref 0–44)
AST SERPL-CCNC: 26 IU/L (ref 0–40)
BILIRUB DIRECT SERPL-MCNC: 0.21 MG/DL (ref 0–0.4)
BILIRUB SERPL-MCNC: 0.9 MG/DL (ref 0–1.2)
BUN SERPL-MCNC: 15 MG/DL (ref 6–24)
BUN/CREAT SERPL: 11 (ref 9–20)
CALCIUM SERPL-MCNC: 9.4 MG/DL (ref 8.7–10.2)
CHLORIDE SERPL-SCNC: 103 MMOL/L (ref 96–106)
CO2 SERPL-SCNC: 24 MMOL/L (ref 20–29)
CREAT SERPL-MCNC: 1.34 MG/DL (ref 0.76–1.27)
GLUCOSE SERPL-MCNC: 84 MG/DL (ref 65–99)
INR PPP: 1.1 (ref 0.9–1.2)
POTASSIUM SERPL-SCNC: 4.9 MMOL/L (ref 3.5–5.2)
PROT SERPL-MCNC: 7 G/DL (ref 6–8.5)
PROTHROMBIN TIME: 11.6 SEC (ref 9.1–12)
SODIUM SERPL-SCNC: 143 MMOL/L (ref 134–144)

## 2020-12-28 NOTE — PROGRESS NOTES
The blood work and cancer marker were normal. We will continue to monitor for [address] every 6 months due to the question of cirrhosis on ultrasound.

## 2021-01-23 ENCOUNTER — HOSPITAL ENCOUNTER (OUTPATIENT)
Dept: ULTRASOUND IMAGING | Age: 51
Discharge: HOME | End: 2021-01-23
Attending: NURSE PRACTITIONER
Payer: COMMERCIAL

## 2021-01-23 DIAGNOSIS — K70.30 ALCOHOLIC CIRRHOSIS OF LIVER WITHOUT ASCITES (HCC): ICD-10-CM

## 2021-01-23 PROCEDURE — 76700 US EXAM ABDOM COMPLETE: CPT

## 2021-02-01 NOTE — PROGRESS NOTES
My chart message sent to the patient regarding the ultrasound which showed no concerning liver mass or lesion.

## 2021-02-24 NOTE — MR AVS SNAPSHOT
Tech transport requested to Myriam Nolan  02/24/21 1016     Visit Information Date & Time Provider Department Dept. Phone Encounter #  
 12/29/2017  3:05 PM Leonarda Galloawy. [address] [facility] 219 [id number] Follow-up Instructions Return in about 4 weeks (around 1/26/2018) for brett cano. Upcoming Health Maintenance Date Due Pneumococcal 19-64 Highest Risk (1 of 3 - PCV13) 1/4/1990 DTaP/Tdap/Td series (1 - Tdap) 1/4/1992 Influenza Age 5 to Adult 8/1/2017 Allergies as of 12/29/2017  Review Complete On: 12/29/2017 By: David Delong LPN Severity Noted Reaction Type Reactions Aspirin  10/28/2011    Rash Current Immunizations  Never Reviewed No immunizations on file. Not reviewed this visit You Were Diagnosed With   
  
 Codes Comments Alcohol abuse    -  Primary ICD-10-CM: F10.10 ICD-9-CM: 305.00 Cirrhosis of liver without ascites, unspecified hepatic cirrhosis type ([facility]     ICD-10-CM: K74.60 ICD-9-CM: 571.5 Vitals BP Pulse Temp Height(growth percentile) 102/71 (BP 1 Location: Left arm, BP Patient Position: Sitting) 68 97.9 °F (36.6 °C) (Tympanic) 5' 6\" (1.676 m) Weight(growth percentile) SpO2 BMI Smoking Status 136 lb 12.8 oz (62.1 kg) 98% 22.08 kg/m2 Current Every Day Smoker BMI and BSA Data Body Mass Index Body Surface Area 22.08 kg/m 2 1.7 m 2 Preferred Pharmacy Pharmacy Name Phone Angela [facility] [address]  Shaniko Weston [phone] Your Updated Medication List  
  
   
This list is accurate as of: 12/29/17  3:58 PM.  Always use your most recent med list.  
  
  
  
  
 metroNIDAZOLE 500 mg tablet Commonly known as:  FLAGYL TK 1 T PO TID PROBIOTIC 4X 10-15 mg Tbec Generic drug:  B.infantis-B.ani-B.long-B.bifi Take  by mouth. SUPREP BOWEL PREP KIT 17.5-3.13-1.6 gram Solr oral solution Generic drug:  sodium-potassium-mag sulfate MIX AND DRINK UTD BEFORE COLONOSCOPY THERA tablet Generic drug:  therapeutic multivitamin Take 1 Tab by mouth daily. We Performed the Following ACTIN (SMOOTH MUSCLE) ANTIBODY R7552434 CPT(R)] AFP WITH AFP-L3% [[id number] Custom] ALPHA-1-ANTITRYPSIN DEFICIENCY [78030 CPT(R)] ANSLEY, DIRECT, W/REFLEX [id number] CPT(R)] ANCA PANEL [id number] CPT(R)] CERULOPLASMIN [id number] CPT(R)] ETHYL ALCOHOL [id number] CPT(R)] FERRITIN [32318 CPT(R)] HCV AB W/RFLX TO CAL [08820 CPT(R)] HEP A AB, TOTAL [id number] CPT(R)] HEP B SURFACE AB [id number] CPT(R)] HEPATIC FUNCTION PANEL (6) [[id number] Custom] HEPATITIS B CORE AB, TOTAL [id number] CPT(R)] IRON PROFILE [id number] CPT(R)] LIPASE [id number] CPT(R)] METABOLIC PANEL, BASIC [53187 CPT(R)] MITOCHONDRIAL M2 AB [id number] CPT(R)] PROTHROMBIN TIME + INR [82142 CPT(R)] Follow-up Instructions Return in about 4 weeks (around 1/26/2018) for fibroscan jerome. Patient Instructions FIBROSCAN PATIENT INFORMATION What is Fibroscan:? 
 
Fibroscan is an ultrasound device that measures liver stiffness by sending a pulse of vibrations through the liver. This translated into an immediate result that can help your healthcare team determine the level of damage to the liver as well as monitor the condition of various liver diseases over time. Fibroscan is helpful in the evaluation of the following conditions: 
 
Chronic Hepatitis C Chronic Hepatitis B Fatty Liver Disease Alcohol Liver Disease Chronic Cholestatic Liver Diseases What happens During the Scan? Patients receiving this exam lie flat on an examination table and raise the right arm above the head. The skin over the right lower rib cage is exposed and the examiner locates the correct area to be scanned. The prove of the scanner is placed directly on the patient and triggered to start. This fells like a gentle flick against the skin and should not be uncomfortable. At least ten (10) readings are taken and the average is calculated to score the amount of liver stiffness or scar tissue. The exam should take 10-20 minutes. What do I need to do to prepare for the scan? Please do not eat or drink anything 2-4 hours  Before your Fibroscan. You should continue taking any prescribed medication and can take small sips of water or clear fluid to do so,  But avoid drinking large amounts of fluid. Please dress comfortably in clothes that will allow for easy access to the right side of the abdomen. Women are discouraged from wearing a dress on the day of the exam. 
 
Are there any special precautions? Patients who are pregnant or have an implantable device (for example, pacemaker or defibrillator) should not have this exam performed. Patients with a significant amount of fat tissue in the area the probe is pressed may be unable to have test performed. Introducing [facility] SERVICES! Dear Diana Malave III: 
Thank you for requesting a [employer] account. Our records indicate that you already have an active [employer] account. You can access your account anytime at [email or web address] Did you know that you can access your hospital and ER discharge instructions at any time in [employer]? You can also review all of your test results from your hospital stay or ER visit. Additional Information If you have questions, please visit the Frequently Asked Questions section of the [employer] website at [email or web address] Remember, [employer] is NOT to be used for urgent needs. For medical emergencies, dial 911. Now available from your iPhone and Android! Please provide this summary of care documentation to your next provider. Your primary care clinician is listed as NONE. If you have any questions after today's visit, please call [phone].

## 2021-06-23 ENCOUNTER — OFFICE VISIT (OUTPATIENT)
Dept: HEMATOLOGY | Age: 51
End: 2021-06-23
Payer: COMMERCIAL

## 2021-06-23 VITALS
RESPIRATION RATE: 20 BRPM | BODY MASS INDEX: 22.34 KG/M2 | SYSTOLIC BLOOD PRESSURE: 109 MMHG | HEIGHT: 66 IN | TEMPERATURE: 97.4 F | WEIGHT: 139 LBS | OXYGEN SATURATION: 99 % | HEART RATE: 73 BPM | DIASTOLIC BLOOD PRESSURE: 70 MMHG

## 2021-06-23 DIAGNOSIS — K70.30 ALCOHOLIC CIRRHOSIS OF LIVER WITHOUT ASCITES (HCC): Primary | ICD-10-CM

## 2021-06-23 DIAGNOSIS — R79.89 ELEVATED SERUM CREATININE: ICD-10-CM

## 2021-06-23 PROCEDURE — 99214 OFFICE O/P EST MOD 30 MIN: CPT | Performed by: NURSE PRACTITIONER

## 2021-06-23 NOTE — PROGRESS NOTES
Vidal Rodriguez MD, Rocio Osman, MD Shahnaz Garcia, PA-C Candance Sar, [facility]     Fabiola [place], [facility]   TAVON Odonnell NP [address] [facility] 136    at [facility]    [address] [facility], [address] [place] [address], [facility] 22.    [phone]    FAX: [address] [place] [address], [address]    [phone]    FAX: [phone]       Patient Care Team:  None as PCP - General  Charles Marquez MD (Gastroenterology)  Victoriano Rubi MD as Physician (Hematology and Oncology)      Problem List  Date Reviewed: 12/28/2020        Codes Class Noted    Elevated serum creatinine ICD-10-CM: R79.89  ICD-9-CM: 790.99  12/20/2019        Cirrhosis ([facility] ICD-10-CM: K74.60  ICD-9-CM: 571.5  1/31/2018        Thrombocytopenia ([facility] ICD-10-CM: D69.6  ICD-9-CM: 287.5  8/24/2017            Matheny Layne III \"Rajinder\" returns to the [address] for management of 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 The active problem list, all pertinent past medical history, medications, liver histology, endoscopic studies, radiologic findings and laboratory findings related to the liver disorder were reviewed with the patient. The patient is a 49 y.o. Black male who was first found to have chronic liver disease and cirrhosis due to alcohol in 2017 when he was hospitalized for sepsis. He has remained abstinent from alcohol since 8/2017. Assessment of liver fibrosis was performed with Fibroscan 6/2019. The result was 8.7 kPa which correlates with stage 2 septal fibrosis. Assessment of liver fibrosis was performed with Fibroscan in the office today.  The result was 6.6 kPa which correlates with no fibrosis to stage 1 portal fibrosis. Since the last office visit the patient has continued to remain abstinent from alcohol. He feels well overall with no new complaints. Of note, he has been fully vaccinated against Covid since 4/2021. The patient has no symptoms which can be attributed to the liver disorder. The patient has not experienced fatigue, or pain in the right side over the liver. The patient completes all daily activities without any functional limitations. ASSESSMENT AND PLAN:  Alcohol liver disease  The patient was thought to have cirrhosis   The diagnosis of cirrhosis was based upon elastography, imaging, laboratory studies, and a history of consuming alcohol in excess. Elastography performed in 1/2918 suggested Cirrhosis. Elastography performed in 6/2019 suggested only stage 2 fibrosis. Elastography performed 12/2020 suggests stage 0-1 fibrosis. This has continued to improve over time. The most recent ultrasound suggested he has cirrhosis. Advised he should be screened every 6-12 months with ultrasound and AFP. Have performed laboratory testing to monitor liver function and degree of liver injury. This included BMP, hepatic panel, CBC with platelet count and INR. Laboratory testing from 12/23/2020 reviewed in detail. Follow-up testing ordered today. The liver transaminases, ALP, liver function, and platelet count are all normal.     In retrospect the patient probably had alcohol induced liver injury/hepatitis and hepatic decompensation due to sepsis in 2017. The ultrasound suggests cirrhosis therefore will continue to monitor. Will repeat Fibroscan yearly. This will be done at the next office visit. If the liver stiffness continues to decline and approaches normal then no further follow-up would be required. Elevated Serum Creatinine  Discussed mild elevation in Sr. Creat 1.39. This may need to be further evaluated by nephrology.      Elevation in Ferritin  There is an elevation in ferritin with normal iron saturation in 2018. It is unlikely that the patient has hemochromatosis or is a carrier for an HFE gene with a normal saturation. .    Treatment of other medical problems in patients with chronic liver disease  There are no contraindications for the patient to take most medications that are necessary for treatment of other medical issues. The patient has alcohol induced liver disease but has been abstinent from alcohol for greater than 6 months. Normal doses of acetaminophen, as recommended on the label of the bottle, can be utilized. Counseling for alcohol in patients with chronic liver disease  The patient was counseled regarding alcohol consumption and the effect of alcohol on chronic liver disease. The patient has not consumed alcohol since 8/2017. Vaccinations   Vaccination for viral hepatitis A and B is recommended since the patient has no serologic evidence of previous exposure or vaccination with immunity. Routine vaccinations against other bacterial and viral agents can be performed as indicated. Annual flu vaccination should be administered if indicated. ALLERGIES  Allergies   Allergen Reactions    Aspirin Rash     MEDICATIONS  Current Outpatient Medications   Medication Sig    B.infantis-B.ani-B.long-B.bifi (PROBIOTIC 4X) 10-15 mg TbEC Take  by mouth.  therapeutic multivitamin (THERA) tablet Take 1 Tab by mouth daily. No current facility-administered medications for this visit. SYSTEM REVIEW NOT RELATED TO LIVER DISEASE OR REVIEWED ABOVE:  Constitution systems: Negative for fever, chills, weight gain, weight loss. Eyes: Negative for visual changes. ENT: Negative for sore throat, painful swallowing. Respiratory: Negative for cough, hemoptysis, SOB. Cardiology: Negative for chest pain, palpitations. GI:  Negative for constipation or diarrhea. : Negative for urinary frequency, dysuria, hematuria, nocturia.    Skin: Negative for rash. Hematology: Negative for easy bruising, blood clots. Musculo-skeletal: Negative for back pain, muscle pain, weakness. Neurologic: Negative for headaches, dizziness, vertigo, memory problems not related to HE. Psychology: Negative for anxiety, depression. FAMILY HISTORY:  The father  of prostate cancer. The mother is alive and healthy. There is no family history of liver disease. SOCIAL HISTORY:  The patient is . The patient has 2 children. The patient has never used tobacco products. The patient has previously consumed alcohol in excess. The patient has been abstinent from alcohol since 2017. The patient currently works full time for a construction company. PHYSICAL EXAMINATION:  Visit Vitals  /70 (BP 1 Location: Right upper arm, BP Patient Position: Sitting, BP Cuff Size: Adult)   Pulse 73   Temp 97.4 °F (36.3 °C) (Temporal)   Resp 20   Ht 5' 6\" (1.676 m)   Wt 139 lb (63 kg)   SpO2 99%   BMI 22.44 kg/m²       General: No acute distress. Eyes: Sclera anicteric. ENT: No oral lesions. Thyroid normal.  Nodes: No adenopathy. Skin: No spider angiomata. No jaundice. No palmar erythema. Respiratory: Lungs clear to auscultation. Cardiovascular: Regular heart rate. No murmurs. No JVD. Abdomen: Soft non-tender, liver size normal to percussion/palpation. Spleen not palpable. No obvious ascites. Extremities: No edema. No muscle wasting. No gross arthritic changes. Neurologic: Alert and oriented. Cranial nerves grossly intact. No asterixis.     LABORATORY STUDIES:  [facility] [place] of [address] [place] [address] 2020   WBC 3.4 - 10.8 x10E3/uL 3.3 (L) 3.0 (L)   ANC 1.4 - 7.0 x10E3/uL 1.8 1.6   HGB 13.0 - 17.7 g/dL 13.4 14.6    - 450 x10E3/uL 171 177   INR 0.9 - 1.2 1.1 1.1   AST 0 - 40 IU/L 26 26   ALT 0 - 44 IU/L 17 25   Alk Phos 48 - 121 IU/L 80 80   Bili, Total 0.0 - 1.2 mg/dL 0.9 0.9   Bili, Direct 0.00 - 0.40 mg/dL 0.25 0.21   Albumin 4.0 - 5.0 g/dL 4.6 4.7   BUN 6 - 24 mg/dL 17 15   Creat 0.76 - 1.27 mg/dL 1.36 (H) 1.34 (H)   Na 134 - 144 mmol/L 146 (H) 143   K 3.5 - 5.2 mmol/L 3.9 4.9   Cl 96 - 106 mmol/L 108 (H) 103   CO2 20 - 29 mmol/L 22 24   Glucose 65 - 99 mg/dL 74 84     Liver [place] [address] Ref Rng & Units 6/23/2020   WBC 3.4 - 10.8 x10E3/uL 3.6   ANC 1.4 - 7.0 x10E3/uL 1.7   HGB 13.0 - 17.7 g/dL 14.6    - 450 x10E3/uL 204   INR 0.9 - 1.2 1.1   AST 0 - 40 IU/L 23   ALT 0 - 44 IU/L 22   Alk Phos 48 - 121 IU/L 75   Bili, Total 0.0 - 1.2 mg/dL 0.8   Bili, Direct 0.00 - 0.40 mg/dL 0.21   Albumin 4.0 - 5.0 g/dL 4.8   BUN 6 - 24 mg/dL 11   Creat 0.76 - 1.27 mg/dL 1.39 (H)   Na 134 - 144 mmol/L 143   K 3.5 - 5.2 mmol/L 4.5   Cl 96 - 106 mmol/L 101   CO2 20 - 29 mmol/L 28   Glucose 65 - 99 mg/dL 59 (L)     Cancer Screening Latest Ref Rng & Units 6/23/2021 12/23/2020 6/23/2020   AFP, Serum 0.0 - 8.0 ng/mL 1.0 1.0 1.2   AFP-L3% 0.0 - 9.9 % Comment Comment Comment     Laboratory testing from 12/23/2020 reviewed in detail. Additional testing included to evaluate progression or regression of disease. Laboratory testing results from today will be communicated by My Chart.      SEROLOGIES:  Serologies Latest Ref Rng & Units 12/29/2017 8/24/2017   Hep A Ab, Total Negative Negative    Hep B Surface Ag Index  0.23   Hep B Surface Ag Interp NEG    NEGATIVE   Hep B Core Ab, Total Negative Negative    Hep B Surface AB QL  Non Reactive    Hep C Ab 0.0 - 0.9 s/co ratio <0.1    Hep C Ab NR    NONREACTIVE   [place] Ab, Direct Negative Negative    C-ANCA Neg:<1:20 titer <1:20    P-ANCA Neg:<1:20 titer <1:20    ANCA Neg:<1:20 titer <1:20    ASMCA 0 - 19 Units 19    M2 Ab 0.0 - 20.0 Units 31.0 (H)    Ceruloplasmin 16.0 - 31.0 mg/dL 20.2      Serologies Latest Ref Rng & Units 2/22/2018 1/31/2018 12/29/2017   Ferritin 30 - 400 ng/mL 659 (H) 781 (H) 833 (H)   Iron % Saturation 15 - 55 %   22       LIVER HISTOLOGY:  1/2018. FibroScan performed at [address]. EkPa was 22.8. IQR/med 16%. The results suggested a fibrosis level of F4. CAP is 242, not consistent with fatty liver disease. 6/2019. FibroScan performed at [address]. EkPa was 8.7. IQR/med 10%. . The results suggested a fibrosis level of F2-3. The CAP score suggests no evidence of fatty liver.  6/2020. FibroScan performed at [address]. EkPa was 6.6. IQR/med 9%. . The results suggested a fibrosis level of F0-1. The CAP score suggests there is no significant hepatic steatosis. ENDOSCOPIC PROCEDURES:  10/2017. Reports of an EGD without varices. The patient brought in discharge paperwork which includes physician findings. No mention of varices. 10/2017: Colonoscopy with Dr. Joel Randle. Normal.     RADIOLOGY:  8/2017. CT scan abdomen with IV contrast. Normal appearing liver. No liver mass lesions. Normal spleen. Moderate ascites. 3/14/2018. Abdominal ultrasound. Normal hepatic echotexture with no focal mass or lesion in the liver. 11/19/2018. Abdominal ultrasound. Normal appeairng liver. No liver masses. No ascites. 11/2018. Ultrasound of liver. Echogenic consistent with chronic liver disease. No liver mass lesions. No dilated bile ducts. No ascites. 7/2019. Ultrasound of liver. Echogenic consistent with chronic liver disease. No liver mass or lesions. No dilated ducts. No ascites. 1/2020. Ultrasound of liver. Echogenic liver compatible with hepatic steatosis. No mass or lesion. No Splenomegaly. 6/2020. Ultrasound of liver. Echogenic consistent with cirrhosis. No ductal dilatation. No liver lesion or mass. No ascites. 1/2021. Ultrasound of liver. Mildly echogenic liver. No concerning mass or lesion. No ductal dilatation. No stones. No splenomegaly.      OTHER TESTING:  Not available or performed    FOLLOW-UP:  All of the issues listed above in the Assessment and Plan were discussed with the patient. All questions were answered. The patient expressed a clear understanding of the above. [facility] 87 in 6 months. Imaging will be performed with ultrasound in the next 2-4 weeks. MYCHAL Roberts 13 of [id number] [address] [facility] [address] [id number] Rolando Pope, [address] [facility] 22.  201 [facility]

## 2021-06-23 NOTE — PROGRESS NOTES
Identified pt with two pt identifiers(name and ). Reviewed record in preparation for visit and have obtained necessary documentation. Chief Complaint   Patient presents with    Cirrhosis Of Liver      Vitals:    21 1448   BP: 109/70   Pulse: 73   Resp: 20   Temp: 97.4 °F (36.3 °C)   TempSrc: Temporal   SpO2: 99%   Weight: 139 lb (63 kg)   Height: 5' 6\" (1.676 m)   PainSc:   0 - No pain       Health Maintenance Review: Patient reminded of \"due or due soon\" health maintenance. I have asked the patient to contact his/her primary care provider (PCP) for follow-up on his/her health maintenance. Coordination of Care Questionnaire:  :   1) Have you been to an emergency room, urgent care, or hospitalized since your last visit? If yes, where when, and reason for visit? no       2. Have seen or consulted any other health care provider since your last visit? If yes, where when, and reason for visit? NO      Patient is accompanied by self I have received verbal consent from Dionte Beauchamp III to discuss any/all medical information while they are present in the room.

## 2021-06-24 LAB
AFP L3 MFR SERPL: NORMAL % (ref 0–9.9)
AFP SERPL-MCNC: 1 NG/ML (ref 0–8)
ALBUMIN SERPL-MCNC: 4.6 G/DL (ref 4–5)
ALP SERPL-CCNC: 80 IU/L (ref 48–121)
ALT SERPL-CCNC: 17 IU/L (ref 0–44)
AST SERPL-CCNC: 26 IU/L (ref 0–40)
BASOPHILS # BLD AUTO: 0 X10E3/UL (ref 0–0.2)
BASOPHILS NFR BLD AUTO: 0 %
BILIRUB DIRECT SERPL-MCNC: 0.25 MG/DL (ref 0–0.4)
BILIRUB SERPL-MCNC: 0.9 MG/DL (ref 0–1.2)
BUN SERPL-MCNC: 17 MG/DL (ref 6–24)
BUN/CREAT SERPL: 13 (ref 9–20)
CALCIUM SERPL-MCNC: 9.1 MG/DL (ref 8.7–10.2)
CHLORIDE SERPL-SCNC: 108 MMOL/L (ref 96–106)
CO2 SERPL-SCNC: 22 MMOL/L (ref 20–29)
CREAT SERPL-MCNC: 1.36 MG/DL (ref 0.76–1.27)
EOSINOPHIL # BLD AUTO: 0.1 X10E3/UL (ref 0–0.4)
EOSINOPHIL NFR BLD AUTO: 2 %
ERYTHROCYTE [DISTWIDTH] IN BLOOD BY AUTOMATED COUNT: 11.2 % (ref 11.6–15.4)
GLUCOSE SERPL-MCNC: 74 MG/DL (ref 65–99)
HCT VFR BLD AUTO: 37.7 % (ref 37.5–51)
HGB BLD-MCNC: 13.4 G/DL (ref 13–17.7)
IMM GRANULOCYTES # BLD AUTO: 0 X10E3/UL (ref 0–0.1)
IMM GRANULOCYTES NFR BLD AUTO: 0 %
INR PPP: 1.1 (ref 0.9–1.2)
LYMPHOCYTES # BLD AUTO: 1.3 X10E3/UL (ref 0.7–3.1)
LYMPHOCYTES NFR BLD AUTO: 39 %
MCH RBC QN AUTO: 32.7 PG (ref 26.6–33)
MCHC RBC AUTO-ENTMCNC: 35.5 G/DL (ref 31.5–35.7)
MCV RBC AUTO: 92 FL (ref 79–97)
MONOCYTES # BLD AUTO: 0.2 X10E3/UL (ref 0.1–0.9)
MONOCYTES NFR BLD AUTO: 6 %
NEUTROPHILS # BLD AUTO: 1.8 X10E3/UL (ref 1.4–7)
NEUTROPHILS NFR BLD AUTO: 53 %
PLATELET # BLD AUTO: 171 X10E3/UL (ref 150–450)
POTASSIUM SERPL-SCNC: 3.9 MMOL/L (ref 3.5–5.2)
PROT SERPL-MCNC: 6.8 G/DL (ref 6–8.5)
PROTHROMBIN TIME: 11.9 SEC (ref 9.1–12)
RBC # BLD AUTO: 4.1 X10E6/UL (ref 4.14–5.8)
SODIUM SERPL-SCNC: 146 MMOL/L (ref 134–144)
WBC # BLD AUTO: 3.3 X10E3/UL (ref 3.4–10.8)

## 2021-06-29 NOTE — PROGRESS NOTES
My chart message sent to the patient regarding the blood work results. The liver number are normal. The kidney numbers remain elevated, advised he consider seeing a kidney specialist for evaluation.

## 2021-07-10 ENCOUNTER — HOSPITAL ENCOUNTER (OUTPATIENT)
Dept: ULTRASOUND IMAGING | Age: 51
Discharge: HOME | End: 2021-07-10
Attending: NURSE PRACTITIONER
Payer: COMMERCIAL

## 2021-07-10 PROCEDURE — 76700 US EXAM ABDOM COMPLETE: CPT

## 2021-07-13 NOTE — PROGRESS NOTES
My chart message sent to the patient regarding the ultrasound result which shows no concerning liver mass or lesion. There is a very small gallbladder polyp. Will monitor with ultrasound in 6 months.

## 2021-12-28 ENCOUNTER — OFFICE VISIT (OUTPATIENT)
Dept: HEMATOLOGY | Age: 51
End: 2021-12-28
Payer: COMMERCIAL

## 2021-12-28 VITALS
SYSTOLIC BLOOD PRESSURE: 115 MMHG | RESPIRATION RATE: 16 BRPM | OXYGEN SATURATION: 100 % | HEIGHT: 66 IN | BODY MASS INDEX: 23.21 KG/M2 | DIASTOLIC BLOOD PRESSURE: 74 MMHG | TEMPERATURE: 97.3 F | HEART RATE: 73 BPM | WEIGHT: 144.4 LBS

## 2021-12-28 DIAGNOSIS — K70.30 ALCOHOLIC CIRRHOSIS OF LIVER WITHOUT ASCITES (HCC): Primary | ICD-10-CM

## 2021-12-28 PROCEDURE — 99214 OFFICE O/P EST MOD 30 MIN: CPT | Performed by: NURSE PRACTITIONER

## 2021-12-28 NOTE — PROGRESS NOTES
Identified pt with two pt identifiers(name and ). Reviewed record in preparation for visit and have obtained necessary documentation. Chief Complaint   Patient presents with    Cirrhosis Of Liver     f/u      Vitals:    21 1501   BP: 115/74   Pulse: 73   Resp: 16   Temp: 97.3 °F (36.3 °C)   TempSrc: Temporal   SpO2: 100%   Weight: 144 lb 6.4 oz (65.5 kg)   Height: 5' 6\" (1.676 m)   PainSc:   0 - No pain       Health Maintenance Review: Patient reminded of \"due or due soon\" health maintenance. I have asked the patient to contact his/her primary care provider (PCP) for follow-up on his/her health maintenance. Coordination of Care Questionnaire:  :   1) Have you been to an emergency room, urgent care, or hospitalized since your last visit? If yes, where when, and reason for visit? no       2. Have seen or consulted any other health care provider since your last visit? If yes, where when, and reason for visit? YES, Nephrologist, Dr Marte      Patient is accompanied by self I have received verbal consent from [facility] to discuss any/all medical information while they are present in the room.

## 2021-12-28 NOTE — PROGRESS NOTES
[address], MD, MD Mehdi Jose PA-C Bridgett Ruffing, [facility]     April S Burt, [facility]   TAVON Alan NP [address] [facility] [address]    at [facility]    [address] [facility], [address] Dwaine Pope  22.    [phone]    FAX: [address] [place] [address]    at [address], [address]    [phone]    FAX: [phone]       Patient Care Team:  None as PCP - General  Bennie Pineda MD (Nephrology)      Problem List  Date Reviewed: 6/25/2021          Codes Class Noted    Elevated serum creatinine ICD-10-CM: R79.89  ICD-9-CM: 790.99  12/20/2019        Cirrhosis ([facility] [address] ICD-10-CM: K74.60  ICD-9-CM: 571.5  1/31/2018        Thrombocytopenia ([facility] [address] ICD-10-CM: D69.6  ICD-9-CM: 287.5  8/24/2017            Elisa Vincennes III \"Rajinder\" [facility] for management of 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 The active problem list, all pertinent past medical history, medications, liver histology, endoscopic studies, radiologic findings and laboratory findings related to the liver disorder were reviewed with the patient. The patient is a 49 y.o. Black male who was first found to have chronic liver disease and cirrhosis due to alcohol in 2017 when he was hospitalized for sepsis. He has remained abstinent from alcohol since 8/2017. Assessment of liver fibrosis was performed with Fibroscan 6/2019. The result was 8.7 kPa which correlates with stage 2 septal fibrosis. Assessment of liver fibrosis was performed with Fibroscan in the office today. The result was 6.6 kPa which correlates with no fibrosis to stage 1 portal fibrosis.      Since the last office visit the patient has felt well overall with no new medical issues. He has remained abstinent from all alcohol. The patient has no symptoms which can be attributed to the liver disorder. The patient has not experienced fatigue, or pain in the right side over the liver. The patient completes all daily activities without any functional limitations. ASSESSMENT AND PLAN:  Alcohol liver disease  The patient was thought to have cirrhosis   The diagnosis of cirrhosis was based upon elastography, imaging, laboratory studies, and a history of consuming alcohol in excess. Elastography performed in 1/2918 suggested Cirrhosis. Elastography performed in 6/2019 suggested only stage 2 fibrosis. Elastography performed 12/2020 suggests stage 0-1 fibrosis. This has continued to improve over time. The most recent ultrasound suggested he has cirrhosis. Advised he should be screened every 6-12 months with ultrasound and AFP. Have performed laboratory testing to monitor liver function and degree of liver injury. This included BMP, hepatic panel, CBC with platelet count and INR. Laboratory testing from 6/23/2021 reviewed in detail. Follow-up testing ordered today. The liver transaminases, ALP, liver function and platelet count are normal.     In retrospect the patient probably had alcohol induced liver injury/hepatitis and hepatic decompensation due to sepsis in 2017. The ultrasound suggests cirrhosis therefore will continue to monitor. The patient wishes to continue follow-up every 6 months with [facility]. screening. Elevated Serum Creatinine  Discussed mild elevation in Sr. Creat 1.39. He is currently being followed by Nephrology. Elevation in Ferritin  There is an elevation in ferritin with normal iron saturation in 2018. It is unlikely that the patient has hemochromatosis or is a carrier for an HFE gene with a normal saturation.   .    Treatment of other medical problems in patients with chronic liver disease  There are no contraindications for the patient to take most medications that are necessary for treatment of other medical issues. The patient has alcohol induced liver disease but has been abstinent from alcohol for greater than 6 months. Normal doses of acetaminophen, as recommended on the label of the bottle, can be utilized. Counseling for alcohol in patients with chronic liver disease  The patient was counseled regarding alcohol consumption and the effect of alcohol on chronic liver disease. The patient has not consumed alcohol since 8/2017. Screening for Hepatocellular Carcinoma  [facility] [address] screening was last performed with ultrasound 7/2021. AFP normal. Will order ultrasound and AFP. Vaccinations   Vaccination for viral hepatitis A and B is recommended since the patient has no serologic evidence of previous exposure or vaccination with immunity. Routine vaccinations against other bacterial and viral agents can be performed as indicated. Annual flu vaccination should be administered if indicated. ALLERGIES  Allergies   Allergen Reactions    Aspirin Rash     MEDICATIONS  Current Outpatient Medications   Medication Sig    B.infantis-B.ani-B.long-B.bifi (PROBIOTIC 4X) 10-15 mg TbEC Take  by mouth.  therapeutic multivitamin (THERA) tablet Take 1 Tab by mouth daily. No current facility-administered medications for this visit. SYSTEM REVIEW NOT RELATED TO LIVER DISEASE OR REVIEWED ABOVE:  Constitution systems: Negative for fever, chills, weight gain, weight loss. Eyes: Negative for visual changes. ENT: Negative for sore throat, painful swallowing. Respiratory: Negative for cough, hemoptysis, SOB. Cardiology: Negative for chest pain, palpitations. GI:  Negative for constipation or diarrhea. : Negative for urinary frequency, dysuria, hematuria, nocturia. Skin: Negative for rash. Hematology: Negative for easy bruising, blood clots.     Musculo-skeletal: Negative for back pain, muscle pain, weakness. Neurologic: Negative for headaches, dizziness, vertigo, memory problems not related to HE. Psychology: Negative for anxiety, depression. FAMILY HISTORY:  The father  of prostate cancer. The mother is alive and healthy. There is no family history of liver disease. SOCIAL HISTORY:  The patient is . The patient has 2 children. The patient has never used tobacco products. The patient has previously consumed alcohol in excess. The patient has been abstinent from alcohol since 2017. The patient currently works full time for a construction company. PHYSICAL EXAMINATION:  Visit Vitals  /74 (BP 1 Location: Right upper arm, BP Patient Position: Sitting, BP Cuff Size: Adult)   Pulse 73   Temp 97.3 °F (36.3 °C) (Temporal)   Resp 16   Ht 5' 6\" (1.676 m)   Wt 144 lb 6.4 oz (65.5 kg)   SpO2 100%   BMI 23.31 kg/m²       General: No acute distress. Eyes: Sclera anicteric. ENT: No oral lesions. Thyroid normal.  Nodes: No adenopathy. Skin: No spider angiomata. No jaundice. No palmar erythema. Respiratory: Lungs clear to auscultation. Cardiovascular: Regular heart rate. No murmurs. No JVD. Abdomen: Soft non-tender, liver size normal to percussion/palpation. Spleen not palpable. No obvious ascites. Extremities: No edema. No muscle wasting. No gross arthritic changes. Neurologic: Alert and oriented. Cranial nerves grossly intact. No asterixis.     LABORATORY STUDIES:  [facility] of [address] [place] [address] Units 2021   WBC 3.4 - 10.8 x10E3/uL 3.6 3.3 (L)   ANC 1.4 - 7.0 x10E3/uL 1.7 1.8   HGB 13.0 - 17.7 g/dL 14.1 13.4    - 450 x10E3/uL 167 171   INR 0.9 - 1.2 1.1 1.1   AST 0 - 40 IU/L 23 26   ALT 0 - 44 IU/L 17 17   Alk Phos 44 - 121 IU/L 83 80   Bili, Total 0.0 - 1.2 mg/dL 0.8 0.9   Bili, Direct 0.00 - 0.40 mg/dL 0.21 0.25   Albumin 4.0 - 5.0 g/dL 4.6 4.6   BUN 6 - 24 mg/dL 16 17   Creat 0.76 - 1.27 mg/dL 1.35 (H) 1.36 (H)   Na 134 - 144 mmol/L 146 (H) 146 (H)   K 3.5 - 5.2 mmol/L 4.1 3.9   Cl 96 - 106 mmol/L 107 (H) 108 (H)   CO2 20 - 29 mmol/L 25 22   Glucose 65 - 99 mg/dL 82 74     [facility] of [address] [place] [address] Ref Rng & Units 12/23/2020   WBC 3.4 - 10.8 x10E3/uL 3.0 (L)   ANC 1.4 - 7.0 x10E3/uL 1.6   HGB 13.0 - 17.7 g/dL 14.6    - 450 x10E3/uL 177   INR 0.9 - 1.2 1.1   AST 0 - 40 IU/L 26   ALT 0 - 44 IU/L 25   Alk Phos 44 - 121 IU/L 80   Bili, Total 0.0 - 1.2 mg/dL 0.9   Bili, Direct 0.00 - 0.40 mg/dL 0.21   Albumin 4.0 - 5.0 g/dL 4.7   BUN 6 - 24 mg/dL 15   Creat 0.76 - 1.27 mg/dL 1.34 (H)   Na 134 - 144 mmol/L 143   K 3.5 - 5.2 mmol/L 4.9   Cl 96 - 106 mmol/L 103   CO2 20 - 29 mmol/L 24   Glucose 65 - 99 mg/dL 84     Cancer Screening Latest Ref Rng & Units 12/28/2021 6/23/2021 12/23/2020   AFP, Serum 0.0 - 8.0 ng/mL 1.0 1.0 1.0   AFP-L3% 0.0 - 9.9 % Comment Comment Comment     Laboratory testing from 6/23/2021 reviewed in detail. Additional testing included to evaluate progression or regression of disease. Laboratory testing results from today will be communicated by My Chart. SEROLOGIES:  Serologies Latest Ref Rng & Units 12/29/2017 8/24/2017   Hep A Ab, Total Negative Negative    Hep B Surface Ag Index  0.23   Hep B Surface Ag Interp NEG    NEGATIVE   Hep B Core Ab, Total Negative Negative    Hep B Surface AB QL  Non Reactive    Hep C Ab 0.0 - 0.9 s/co ratio <0.1    Hep C Ab NR    NONREACTIVE   [place] Ab, Direct Negative Negative    C-ANCA Neg:<1:20 titer <1:20    P-ANCA Neg:<1:20 titer <1:20    ANCA Neg:<1:20 titer <1:20    ASMCA 0 - 19 Units 19    M2 Ab 0.0 - 20.0 Units 31.0 (H)    Ceruloplasmin 16.0 - 31.0 mg/dL 20.2      Serologies Latest Ref Rng & Units 2/22/2018 1/31/2018 12/29/2017   Ferritin 30 - 400 ng/mL 659 (H) 781 (H) 833 (H)   Iron % Saturation 15 - 55 %   22       LIVER HISTOLOGY:  1/2018. [facility]. EkPa was 22.8. IQR/med 16%.  The results suggested a fibrosis level of F4. CAP is 242, not consistent with fatty liver disease. 6/2019. FibroScan performed at [address]. EkPa was 8.7. IQR/med 10%. . The results suggested a fibrosis level of F2-3. The CAP score suggests no evidence of fatty liver.  6/2020. FibroScan performed at [facility] [address] of [place]. EkPa was 6.6. IQR/med 9%. . The results suggested a fibrosis level of F0-1. The CAP score suggests there is no significant hepatic steatosis. ENDOSCOPIC PROCEDURES:  10/2017. Reports of an EGD without varices. The patient brought in discharge paperwork which includes physician findings. No mention of varices. 10/2017: Colonoscopy with Dr. Cuong Rae. Normal.     RADIOLOGY:  8/2017. CT scan abdomen with IV contrast. Normal appearing liver. No liver mass lesions. Normal spleen. Moderate ascites. 3/14/2018. Abdominal ultrasound. Normal hepatic echotexture with no focal mass or lesion in the liver. 11/19/2018. Abdominal ultrasound. Normal appeairng liver. No liver masses. No ascites. 11/2018. Ultrasound of liver. Echogenic consistent with chronic liver disease. No liver mass lesions. No dilated bile ducts. No ascites. 7/2019. Ultrasound of liver. Echogenic consistent with chronic liver disease. No liver mass or lesions. No dilated ducts. No ascites. 1/2020. Ultrasound of liver. Echogenic liver compatible with hepatic steatosis. No mass or lesion. No Splenomegaly. 6/2020. Ultrasound of liver. Echogenic consistent with cirrhosis. No ductal dilatation. No liver lesion or mass. No ascites. 1/2021. Ultrasound of liver. Mildly echogenic liver. No concerning mass or lesion. No ductal dilatation. No stones. No splenomegaly. 6/2021. Ultrasound of liver. Normal appearing liver. No liver mass lesions. 6 x 4 mm echogenic focus in gallbladder that may be a polyp.      OTHER TESTING:  Not available or performed    FOLLOW-UP:  All of the issues listed above in the Assessment and Plan were discussed with the patient. All questions were answered. The patient expressed a clear understanding of the above. [facility] 87 in 6 months. April CODIE [facility] of [address] [id number] Rolando Pope, [address] [facility] 22.  [address] [facility]

## 2021-12-29 LAB
AFP L3 MFR SERPL: NORMAL % (ref 0–9.9)
AFP SERPL-MCNC: 1 NG/ML (ref 0–8)
ALBUMIN SERPL-MCNC: 4.6 G/DL (ref 4–5)
ALP SERPL-CCNC: 83 IU/L (ref 44–121)
ALT SERPL-CCNC: 17 IU/L (ref 0–44)
AST SERPL-CCNC: 23 IU/L (ref 0–40)
BASOPHILS # BLD AUTO: 0 X10E3/UL (ref 0–0.2)
BASOPHILS NFR BLD AUTO: 0 %
BILIRUB DIRECT SERPL-MCNC: 0.21 MG/DL (ref 0–0.4)
BILIRUB SERPL-MCNC: 0.8 MG/DL (ref 0–1.2)
BUN SERPL-MCNC: 16 MG/DL (ref 6–24)
BUN/CREAT SERPL: 12 (ref 9–20)
CALCIUM SERPL-MCNC: 9.2 MG/DL (ref 8.7–10.2)
CHLORIDE SERPL-SCNC: 107 MMOL/L (ref 96–106)
CO2 SERPL-SCNC: 25 MMOL/L (ref 20–29)
CREAT SERPL-MCNC: 1.35 MG/DL (ref 0.76–1.27)
EOSINOPHIL # BLD AUTO: 0.1 X10E3/UL (ref 0–0.4)
EOSINOPHIL NFR BLD AUTO: 2 %
ERYTHROCYTE [DISTWIDTH] IN BLOOD BY AUTOMATED COUNT: 11.3 % (ref 11.6–15.4)
GLUCOSE SERPL-MCNC: 82 MG/DL (ref 65–99)
HCT VFR BLD AUTO: 41.7 % (ref 37.5–51)
HGB BLD-MCNC: 14.1 G/DL (ref 13–17.7)
IMM GRANULOCYTES # BLD AUTO: 0 X10E3/UL (ref 0–0.1)
IMM GRANULOCYTES NFR BLD AUTO: 0 %
INR PPP: 1.1 (ref 0.9–1.2)
LYMPHOCYTES # BLD AUTO: 1.6 X10E3/UL (ref 0.7–3.1)
LYMPHOCYTES NFR BLD AUTO: 45 %
MCH RBC QN AUTO: 32.4 PG (ref 26.6–33)
MCHC RBC AUTO-ENTMCNC: 33.8 G/DL (ref 31.5–35.7)
MCV RBC AUTO: 96 FL (ref 79–97)
MONOCYTES # BLD AUTO: 0.2 X10E3/UL (ref 0.1–0.9)
MONOCYTES NFR BLD AUTO: 7 %
NEUTROPHILS # BLD AUTO: 1.7 X10E3/UL (ref 1.4–7)
NEUTROPHILS NFR BLD AUTO: 46 %
PLATELET # BLD AUTO: 167 X10E3/UL (ref 150–450)
POTASSIUM SERPL-SCNC: 4.1 MMOL/L (ref 3.5–5.2)
PROT SERPL-MCNC: 7 G/DL (ref 6–8.5)
PROTHROMBIN TIME: 11.1 SEC (ref 9.1–12)
RBC # BLD AUTO: 4.35 X10E6/UL (ref 4.14–5.8)
SODIUM SERPL-SCNC: 146 MMOL/L (ref 134–144)
WBC # BLD AUTO: 3.6 X10E3/UL (ref 3.4–10.8)

## 2022-01-08 ENCOUNTER — HOSPITAL ENCOUNTER (OUTPATIENT)
Dept: ULTRASOUND IMAGING | Age: 52
Discharge: HOME | End: 2022-01-08
Attending: NURSE PRACTITIONER
Payer: COMMERCIAL

## 2022-01-08 DIAGNOSIS — K70.30 ALCOHOLIC CIRRHOSIS OF LIVER WITHOUT ASCITES (HCC): ICD-10-CM

## 2022-01-08 PROCEDURE — 76700 US EXAM ABDOM COMPLETE: CPT

## 2022-01-12 NOTE — PROGRESS NOTES
My chart message sent regarding the ultrasound which shows no concerning mass or lesion. It is normal in echotexture.

## 2022-03-19 PROBLEM — R79.89 ELEVATED SERUM CREATININE: Status: ACTIVE | Noted: 2019-12-20

## 2022-03-19 PROBLEM — K74.60 CIRRHOSIS (HCC): Status: ACTIVE | Noted: 2018-01-31

## 2022-03-19 PROBLEM — D69.6 THROMBOCYTOPENIA (HCC): Status: ACTIVE | Noted: 2017-08-24

## 2022-05-03 NOTE — PROGRESS NOTES
1. Have you been to the ER, urgent care clinic since your last visit? Hospitalized since your last visit? No    2. Have you seen or consulted any other health care providers outside of the [facility] since your last visit? Include any pap smears or colon screening. No   Chief Complaint   Patient presents with    Follow-up     Visit Vitals    /75 (BP 1 Location: Left arm, BP Patient Position: Sitting)    Pulse 75    Temp 97 °F (36.1 °C)    Wt 140 lb (63.5 kg)    SpO2 98%    BMI 22.6 kg/m2     PHQ over the last two weeks 7/26/2018   Little interest or pleasure in doing things Not at all   Feeling down, depressed, irritable, or hopeless Not at all   Total Score PHQ 2 0     Learning Assessment 7/26/2018   PRIMARY LEARNER Patient   BARRIERS PRIMARY LEARNER NONE   CO-LEARNER CAREGIVER No   PRIMARY LANGUAGE ENGLISH   LEARNER PREFERENCE PRIMARY LISTENING   ANSWERED BY patient   RELATIONSHIP SELF     Abuse Screening Questionnaire 7/26/2018   Do you ever feel afraid of your partner? N   Are you in a relationship with someone who physically or mentally threatens you? N   Is it safe for you to go home?  Renzo Donnelly No

## 2022-06-27 ENCOUNTER — OFFICE VISIT (OUTPATIENT)
Dept: HEMATOLOGY | Age: 52
End: 2022-06-27
Payer: COMMERCIAL

## 2022-06-27 VITALS
SYSTOLIC BLOOD PRESSURE: 104 MMHG | RESPIRATION RATE: 18 BRPM | TEMPERATURE: 97.2 F | OXYGEN SATURATION: 99 % | DIASTOLIC BLOOD PRESSURE: 66 MMHG | HEIGHT: 66 IN | HEART RATE: 78 BPM | WEIGHT: 136.8 LBS | BODY MASS INDEX: 21.98 KG/M2

## 2022-06-27 DIAGNOSIS — K70.30 ALCOHOLIC CIRRHOSIS OF LIVER WITHOUT ASCITES (HCC): Primary | ICD-10-CM

## 2022-06-27 PROCEDURE — 99214 OFFICE O/P EST MOD 30 MIN: CPT | Performed by: NURSE PRACTITIONER

## 2022-06-27 NOTE — PROGRESS NOTES
[address] [facility], [place], [address], [facility], [place]      MIGUEL Gallo, [facility]     Fabiola Dallas, [facility]   CONSTANTIN De La Torre-C    [facility]       Graciela Agustin Roman De Caro 136    at [address], 81 [facility], [facility] 22.    [phone]    FAX: [address] [place] [address]    at [address], [address]    [phone]    FAX: [phone]       Patient Care Team:  None as PCP - General  Ravi Diaz MD (Nephrology)      Problem List  Date Reviewed: 12/31/2021          Codes Class Noted    Elevated serum creatinine ICD-10-CM: R79.89  ICD-9-CM: 790.99  12/20/2019        Cirrhosis ([facility] [address] ICD-10-CM: K74.60  ICD-9-CM: 571.5  1/31/2018        Thrombocytopenia ([facility] [address] ICD-10-CM: D69.6  ICD-9-CM: 287.5  8/24/2017            Vickiecaleb Hardy III \"Rajinder\" returns to the [address] for management of 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 The active problem list, all pertinent past medical history, medications, liver histology, endoscopic studies, radiologic findings and laboratory findings related to the liver disorder were reviewed with the patient. The patient is a 47 y.o. Black male who was first found to have chronic liver disease and cirrhosis due to alcohol in 2017 when he was hospitalized for sepsis. He has remained abstinent from alcohol since 8/2017. Assessment of liver fibrosis was performed with Fibroscan 6/2019. The result was 8.7 kPa which correlates with stage 2 septal fibrosis. Assessment of liver fibrosis was performed with Fibroscan 6/2020. The result was 6.6 kPa which correlates with no fibrosis to stage 1 portal fibrosis.      Since the last office visit the patient has felt well overall with no new complications of liver disease. The last ultrasound suggested a normal appearing liver. The patient has no symptoms which can be attributed to the liver disorder. The patient has not experienced fatigue, or pain in the right side over the liver. The patient completes all daily activities without any functional limitations. ASSESSMENT AND PLAN:  Alcohol liver disease  The patient was thought to have cirrhosis   The diagnosis of cirrhosis was based upon elastography, imaging, laboratory studies, and a history of consuming alcohol in excess. Elastography performed in 1/2918 suggested Cirrhosis. Elastography performed in 6/2019 suggested only stage 2 fibrosis. Elastography performed 12/2020 suggests stage 0-1 fibrosis. This has continued to improve over time. The most recent ultrasound suggested he has cirrhosis. Advised he should be screened every 6-12 months with ultrasound and AFP. Have performed laboratory testing to monitor liver function and degree of liver injury. This included BMP, hepatic panel, CBC with platelet count and INR. Laboratory testing from 12/28/2021 reviewed in detail. Follow-up testing ordered today. The liver transaminases, ALP, liver function and platelet count are normal.     In retrospect the patient probably had alcohol induced liver injury/hepatitis and hepatic decompensation due to sepsis in 2017. The ultrasound suggests cirrhosis therefore will continue to monitor. The most recent ultrasound 1/2022 reported a normal appearing liver. If the ultrasound and laboratory testing remain normal will recommend he follow-up with PCP yearly. He does not currently have a PCP, advised he should get one. Elevated Serum Creatinine  Discussed mild elevation in Sr. Creat 1.39. He is currently being followed by Nephrology. Elevation in Ferritin  There is an elevation in ferritin with normal iron saturation in 2018.      It is unlikely that the patient has hemochromatosis or is a carrier for an HFE gene with a normal saturation. .    Treatment of other medical problems in patients with chronic liver disease  There are no contraindications for the patient to take most medications that are necessary for treatment of other medical issues. The patient has alcohol induced liver disease but has been abstinent from alcohol for greater than 6 months. Normal doses of acetaminophen, as recommended on the label of the bottle, can be utilized. Counseling for alcohol in patients with chronic liver disease  The patient was counseled regarding alcohol consumption and the effect of alcohol on chronic liver disease. The patient has not consumed alcohol since 8/2017. Screening for Hepatocellular Carcinoma  [facility] [address] screening was last performed with ultrasound 1/2022. AFP normal. Will order ultrasound and AFP today. Vaccinations   Vaccination for viral hepatitis A and B is recommended since the patient has no serologic evidence of previous exposure or vaccination with immunity. Routine vaccinations against other bacterial and viral agents can be performed as indicated. Annual flu vaccination should be administered if indicated. ALLERGIES  Allergies   Allergen Reactions    Aspirin Rash     MEDICATIONS  Current Outpatient Medications   Medication Sig    B.infantis-B.ani-B.long-B.bifi (PROBIOTIC 4X) 10-15 mg TbEC Take  by mouth. (Patient not taking: Reported on 6/27/2022)    therapeutic multivitamin (THERA) tablet Take 1 Tab by mouth daily. No current facility-administered medications for this visit. SYSTEM REVIEW NOT RELATED TO LIVER DISEASE OR REVIEWED ABOVE:  Constitution systems: Negative for fever, chills, weight gain, weight loss. Eyes: Negative for visual changes. ENT: Negative for sore throat, painful swallowing. Respiratory: Negative for cough, hemoptysis, SOB. Cardiology: Negative for chest pain, palpitations.   GI:  Negative for constipation or diarrhea. : Negative for urinary frequency, dysuria, hematuria, nocturia. Skin: Negative for rash. Hematology: Negative for easy bruising, blood clots. Musculo-skeletal: Negative for back pain, muscle pain, weakness. Neurologic: Negative for headaches, dizziness, vertigo, memory problems not related to HE. Psychology: Negative for anxiety, depression. FAMILY HISTORY:  The father  of prostate cancer. The mother is alive and healthy. There is no family history of liver disease. SOCIAL HISTORY:  The patient is . The patient has 2 children. The patient has never used tobacco products. The patient has previously consumed alcohol in excess. The patient has been abstinent from alcohol since 2017. The patient currently works full time for a construction company. PHYSICAL EXAMINATION:  Visit Vitals  /66 (BP 1 Location: Left arm, BP Patient Position: Sitting, BP Cuff Size: Adult long)   Pulse 78   Temp 97.2 °F (36.2 °C) (Temporal)   Resp 18   Ht 5' 6\" (1.676 m)   Wt 136 lb 12.8 oz (62.1 kg)   SpO2 99%   BMI 22.08 kg/m²       General: No acute distress. Eyes: Sclera anicteric. ENT: No oral lesions. Thyroid normal.  Nodes: No adenopathy. Skin: No spider angiomata. No jaundice. No palmar erythema. Respiratory: Lungs clear to auscultation. Cardiovascular: Regular heart rate. No murmurs. No JVD. Abdomen: Soft non-tender, liver size normal to percussion/palpation. Spleen not palpable. No obvious ascites. Extremities: No edema. No muscle wasting. No gross arthritic changes. Neurologic: Alert and oriented. Cranial nerves grossly intact. No asterixis.     LABORATORY STUDIES:  Liver [place] of [address] [place] [address] Units 2021   WBC 3.4 - 10.8 x10E3/uL 3.6 3.3 (L)   ANC 1.4 - 7.0 x10E3/uL 1.7 1.8   HGB 13.0 - 17.7 g/dL 14.1 13.4    - 450 x10E3/uL 167 171   INR 0.9 - 1.2 1.1 1.1   AST 0 - 40 IU/L 23 26   ALT 0 - 44 IU/L 17 17   Alk Phos 44 - 121 IU/L 83 80   Bili, Total 0.0 - 1.2 mg/dL 0.8 0.9   Bili, Direct 0.00 - 0.40 mg/dL 0.21 0.25   Albumin 4.0 - 5.0 g/dL 4.6 4.6   BUN 6 - 24 mg/dL 16 17   Creat 0.76 - 1.27 mg/dL 1.35 (H) 1.36 (H)   Na 134 - 144 mmol/L 146 (H) 146 (H)   K 3.5 - 5.2 mmol/L 4.1 3.9   Cl 96 - 106 mmol/L 107 (H) 108 (H)   CO2 20 - 29 mmol/L 25 22   Glucose 65 - 99 mg/dL 82 74     Liver [place] [address] Ref Rng & Units 12/23/2020   WBC 3.4 - 10.8 x10E3/uL 3.0 (L)   ANC 1.4 - 7.0 x10E3/uL 1.6   HGB 13.0 - 17.7 g/dL 14.6    - 450 x10E3/uL 177   INR 0.9 - 1.2 1.1   AST 0 - 40 IU/L 26   ALT 0 - 44 IU/L 25   Alk Phos 44 - 121 IU/L 80   Bili, Total 0.0 - 1.2 mg/dL 0.9   Bili, Direct 0.00 - 0.40 mg/dL 0.21   Albumin 4.0 - 5.0 g/dL 4.7   BUN 6 - 24 mg/dL 15   Creat 0.76 - 1.27 mg/dL 1.34 (H)   Na 134 - 144 mmol/L 143   K 3.5 - 5.2 mmol/L 4.9   Cl 96 - 106 mmol/L 103   CO2 20 - 29 mmol/L 24   Glucose 65 - 99 mg/dL 84     Cancer Screening Latest Ref Rng & Units 12/28/2021 6/23/2021 12/23/2020   AFP, Serum 0.0 - 8.0 ng/mL 1.0 1.0 1.0   AFP-L3% 0.0 - 9.9 % Comment Comment Comment     Laboratory testing from 12/28/2021 reviewed in detail. Additional testing included to evaluate progression or regression of disease. Laboratory testing results from today will be communicated by My Chart.      SEROLOGIES:  Serologies Latest Ref Rng & Units 12/29/2017 8/24/2017   Hep A Ab, Total Negative Negative    Hep B Surface Ag Index  0.23   Hep B Surface Ag Interp NEG    NEGATIVE   Hep B Core Ab, Total Negative Negative    Hep B Surface AB QL  Non Reactive    Hep C Ab 0.0 - 0.9 s/co ratio <0.1    Hep C Ab NR    NONREACTIVE   [place] Ab, Direct Negative Negative    C-ANCA Neg:<1:20 titer <1:20    P-ANCA Neg:<1:20 titer <1:20    ANCA Neg:<1:20 titer <1:20    ASMCA 0 - 19 Units 19    M2 Ab 0.0 - 20.0 Units 31.0 (H)    Ceruloplasmin 16.0 - 31.0 mg/dL 20.2      Serologies Latest Ref Rng & Units 2/22/2018 1/31/2018 12/29/2017   Ferritin 30 - 400 ng/mL 659 (H) 781 (H) 833 (H)   Iron % Saturation 15 - 55 %   22     LIVER HISTOLOGY:  1/2018. FibroScan performed at [address]. EkPa was 22.8. IQR/med 16%. The results suggested a fibrosis level of F4. CAP is 242, not consistent with fatty liver disease. 6/2019. FibroScan performed at [address]. EkPa was 8.7. IQR/med 10%. . The results suggested a fibrosis level of F2-3. The CAP score suggests no evidence of fatty liver.  6/2020. FibroScan performed at [address]. EkPa was 6.6. IQR/med 9%. . The results suggested a fibrosis level of F0-1. The CAP score suggests there is no significant hepatic steatosis. ENDOSCOPIC PROCEDURES:  10/2017. Reports of an EGD without varices. The patient brought in discharge paperwork which includes physician findings. No mention of varices. 10/2017: Colonoscopy with Dr. Vinicius Flores. Normal.     RADIOLOGY:  8/2017. CT scan abdomen with IV contrast. Normal appearing liver. No liver mass lesions. Normal spleen. Moderate ascites. 3/14/2018. Abdominal ultrasound. Normal hepatic echotexture with no focal mass or lesion in the liver. 11/19/2018. Abdominal ultrasound. Normal appeairng liver. No liver masses. No ascites. 11/2018. Ultrasound of liver. Echogenic consistent with chronic liver disease. No liver mass lesions. No dilated bile ducts. No ascites. 7/2019. Ultrasound of liver. Echogenic consistent with chronic liver disease. No liver mass or lesions. No dilated ducts. No ascites. 1/2020. Ultrasound of liver. Echogenic liver compatible with hepatic steatosis. No mass or lesion. No Splenomegaly. 6/2020. Ultrasound of liver. Echogenic consistent with cirrhosis. No ductal dilatation. No liver lesion or mass. No ascites. 1/2021. Ultrasound of liver. Mildly echogenic liver. No concerning mass or lesion. No ductal dilatation. No stones. No splenomegaly. 6/2021. Ultrasound of liver.   Normal appearing liver.  No liver mass lesions. 6 x 4 mm echogenic focus in gallbladder that may be a polyp. 1/2022. Ultrasound of liver. Normal appearing liver. No liver mass lesions. OTHER TESTING:  Not available or performed    FOLLOW-UP:  All of the issues listed above in the Assessment and Plan were discussed with the patient. All questions were answered. The patient expressed a clear understanding of the above. [address] [address] in 6 months for ongoing monitoring. April S.  CODIE Dallas-Count includes the [facility] of [address] [address] [address] Rolando Pope, [address] [place] [address] 22.  [address] [facility]

## 2022-06-27 NOTE — PROGRESS NOTES
Identified pt with two pt identifiers(name and ). Reviewed record in preparation for visit and have obtained necessary documentation. Chief Complaint   Patient presents with    Cirrhosis Of Liver      Vitals:    22 1327   BP: 104/66   Pulse: 78   Resp: 18   Temp: 97.2 °F (36.2 °C)   TempSrc: Temporal   SpO2: 99%   Weight: 136 lb 12.8 oz (62.1 kg)   Height: 5' 6\" (1.676 m)   PainSc:   0 - No pain       Health Maintenance Review: Patient reminded of \"due or due soon\" health maintenance. I have asked the patient to contact his/her primary care provider (PCP) for follow-up on his/her health maintenance. Coordination of Care Questionnaire:  :   1) Have you been to an emergency room, urgent care, or hospitalized since your last visit? If yes, where when, and reason for visit? no       2. Have seen or consulted any other health care provider since your last visit? If yes, where when, and reason for visit? NO      Patient is accompanied by self I have received verbal consent from Dylon Cabrera III to discuss any/all medical information while they are present in the room.

## 2022-06-29 LAB
ALBUMIN SERPL-MCNC: 4.9 G/DL (ref 3.8–4.9)
ALP SERPL-CCNC: 81 IU/L (ref 44–121)
ALT SERPL-CCNC: 14 IU/L (ref 0–44)
AST SERPL-CCNC: 23 IU/L (ref 0–40)
BASOPHILS # BLD AUTO: 0 X10E3/UL (ref 0–0.2)
BASOPHILS NFR BLD AUTO: 0 %
BILIRUB DIRECT SERPL-MCNC: 0.25 MG/DL (ref 0–0.4)
BILIRUB SERPL-MCNC: 0.9 MG/DL (ref 0–1.2)
BUN SERPL-MCNC: 18 MG/DL (ref 6–24)
BUN/CREAT SERPL: 12 (ref 9–20)
CALCIUM SERPL-MCNC: 9.6 MG/DL (ref 8.7–10.2)
CHLORIDE SERPL-SCNC: 105 MMOL/L (ref 96–106)
CO2 SERPL-SCNC: 25 MMOL/L (ref 20–29)
CREAT SERPL-MCNC: 1.53 MG/DL (ref 0.76–1.27)
EGFR: 55 ML/MIN/1.73
EOSINOPHIL # BLD AUTO: 0 X10E3/UL (ref 0–0.4)
EOSINOPHIL NFR BLD AUTO: 1 %
ERYTHROCYTE [DISTWIDTH] IN BLOOD BY AUTOMATED COUNT: 11.3 % (ref 11.6–15.4)
GLUCOSE SERPL-MCNC: 77 MG/DL (ref 65–99)
HCT VFR BLD AUTO: 44.9 % (ref 37.5–51)
HGB BLD-MCNC: 14.9 G/DL (ref 13–17.7)
IMM GRANULOCYTES # BLD AUTO: 0 X10E3/UL (ref 0–0.1)
IMM GRANULOCYTES NFR BLD AUTO: 0 %
INR PPP: 1.1 (ref 0.9–1.2)
LYMPHOCYTES # BLD AUTO: 1.4 X10E3/UL (ref 0.7–3.1)
LYMPHOCYTES NFR BLD AUTO: 42 %
MCH RBC QN AUTO: 32.3 PG (ref 26.6–33)
MCHC RBC AUTO-ENTMCNC: 33.2 G/DL (ref 31.5–35.7)
MCV RBC AUTO: 97 FL (ref 79–97)
MONOCYTES # BLD AUTO: 0.2 X10E3/UL (ref 0.1–0.9)
MONOCYTES NFR BLD AUTO: 5 %
NEUTROPHILS # BLD AUTO: 1.7 X10E3/UL (ref 1.4–7)
NEUTROPHILS NFR BLD AUTO: 52 %
PLATELET # BLD AUTO: 173 X10E3/UL (ref 150–450)
POTASSIUM SERPL-SCNC: 4.7 MMOL/L (ref 3.5–5.2)
PROT SERPL-MCNC: 7.3 G/DL (ref 6–8.5)
PROTHROMBIN TIME: 11.6 SEC (ref 9.1–12)
RBC # BLD AUTO: 4.62 X10E6/UL (ref 4.14–5.8)
SODIUM SERPL-SCNC: 143 MMOL/L (ref 134–144)
WBC # BLD AUTO: 3.3 X10E3/UL (ref 3.4–10.8)

## 2022-07-01 NOTE — PROGRESS NOTES
Letter sent via my chart regarding the lab results which were normal except the sr. Creat which was higher. He is followed by nephrology.

## 2022-07-02 ENCOUNTER — HOSPITAL ENCOUNTER (OUTPATIENT)
Dept: ULTRASOUND IMAGING | Age: 52
Discharge: HOME | End: 2022-07-02
Attending: NURSE PRACTITIONER
Payer: COMMERCIAL

## 2022-07-02 DIAGNOSIS — K70.30 ALCOHOLIC CIRRHOSIS OF LIVER WITHOUT ASCITES (HCC): ICD-10-CM

## 2022-07-02 PROCEDURE — 76700 US EXAM ABDOM COMPLETE: CPT

## 2022-07-04 LAB
AFP L3 MFR SERPL: NORMAL % (ref 0–9.9)
AFP SERPL-MCNC: 1 NG/ML (ref 0–8.4)

## 2022-07-08 NOTE — PROGRESS NOTES
Letter sent via my chart regarding the ultrasound which does not demonstrate evidence of cirrhosis. There is a 5 mm gallbladder polyp which was not called this on previous exams. The size has been unchanged for several years.

## 2022-07-08 NOTE — PROGRESS NOTES
Initial Anesthesia Post-op Note    Patient: Gale Redmond  Procedure(s) Performed:  SECTION  Anesthesia type: L&D Epidural to     Vital Last Value   Temperature 37.2 °C (99 °F) (18 1605)   Pulse 101 (18 1314)   Respiratory Rate 20 (18 1605)   Non-Invasive   Blood Pressure 110/62 (18 1314)   Arterial  Blood Pressure     Pulse Oximetry 98 % (18 0930)     Last 24 I/O:   Intake/Output Summary (Last 24 hours) at 18 1831  Last data filed at 18 1813   Gross per 24 hour   Intake             1000 ml   Output             1500 ml   Net             -500 ml       PATIENT LOCATION: PACU Phase 1  LEVEL OF CONSCIOUSNESS: participates in exam, oriented, awake, answers questions appropriately and alert  RESPIRATORY STATUS: spontaneous ventilation and unassisted  CARDIOVASCULAR: blood pressure returned to baseline  HYDRATION: euvolemic    PAIN MANAGEMENT: well controlled  NAUSEA: None  AIRWAY PATENCY: patent  POST-OP ASSESSMENT: no complications, patient tolerated procedure well with no complications and regional anesthetic effects remain in place; patient otherwise able to participate in evaluation  COMPLICATIONS: none  Comments: Report given to RN. Vital signs stable. Care transferred to RN.        HANDOFF:  Handoff to receiving nurse was performed and questions were answered       Letter sent to the patient regarding the lab AFP which was normal.

## 2022-12-27 ENCOUNTER — OFFICE VISIT (OUTPATIENT)
Dept: HEMATOLOGY | Age: 52
End: 2022-12-27
Payer: COMMERCIAL

## 2022-12-27 VITALS
OXYGEN SATURATION: 99 % | DIASTOLIC BLOOD PRESSURE: 73 MMHG | SYSTOLIC BLOOD PRESSURE: 111 MMHG | BODY MASS INDEX: 22.18 KG/M2 | TEMPERATURE: 97.2 F | HEART RATE: 93 BPM | WEIGHT: 138 LBS | HEIGHT: 66 IN

## 2022-12-27 DIAGNOSIS — K70.30 ALCOHOLIC CIRRHOSIS OF LIVER WITHOUT ASCITES (HCC): Primary | ICD-10-CM

## 2022-12-27 PROCEDURE — 99214 OFFICE O/P EST MOD 30 MIN: CPT | Performed by: NURSE PRACTITIONER

## 2022-12-27 NOTE — PROGRESS NOTES
[address] [facility], Jason SERRANO, Pari Raúl Ravin, [place]      Jessica Shiela, MIGUEL Hollingsworth, [facility]     Fabiola Dallas, [facility]   Chiquis Moyer NIKI-DAKOTA Stokes, [facility]       Graciela Deputado Roman De [address] 136    at [address], [address] [place] [address], [facility] 22.    [phone]    FAX: [address] [place] [address]    at [address], [address]    [phone]    FAX: [phone]       Patient Care Team:  None as PCP - General  Maggie Jenkins MD (Nephrology)  Maggie Jenkins MD (Nephrology)      Problem List  Date Reviewed: 6/27/2022            Codes Class Noted    Elevated serum creatinine ICD-10-CM: R79.89  ICD-9-CM: 790.99  12/20/2019        Cirrhosis ([facility] 75.) ICD-10-CM: K74.60  ICD-9-CM: 571.5  1/31/2018        Thrombocytopenia ([facility].) ICD-10-CM: D69.6  ICD-9-CM: 287.5  8/24/2017         Anaya Meo III \"Rajinder\" returns to the [facility] for management of 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 The active problem list, all pertinent past medical history, medications, liver histology, endoscopic studies, radiologic findings and laboratory findings related to the liver disorder were reviewed with the patient. The patient is a 47 y.o. Black male who was first found to have chronic liver disease and cirrhosis due to alcohol in 2017 when he was hospitalized for sepsis. He has remained abstinent from alcohol since 8/2017. Assessment of liver fibrosis was performed with Fibroscan 6/2019. The result was 8.7 kPa which correlates with stage 2 septal fibrosis. Assessment of liver fibrosis was performed with Fibroscan 6/2020. The result was 6.6 kPa which correlates with no fibrosis to stage 1 portal fibrosis.      Since the last office visit has felt well overall with no new complications of liver disease. The patient has no symptoms which can be attributed to the liver disorder. The patient has not experienced fatigue, or pain in the right side over the liver. The patient completes all daily activities without any functional limitations. ASSESSMENT AND PLAN:  Alcohol liver disease  The patient was thought to have cirrhosis   The diagnosis of cirrhosis was based upon elastography, imaging, laboratory studies, and a history of consuming alcohol in excess. Elastography performed in 1/2918 suggested Cirrhosis. Elastography performed in 6/2019 suggested only stage 2 fibrosis. Elastography performed 12/2020 suggests stage 0-1 fibrosis. This has continued to improve over time. The most recent ultrasound suggested he has cirrhosis. Have performed laboratory testing to monitor liver function and degree of liver injury. This included BMP, hepatic panel, CBC with platelet count and INR. Laboratory testing from 6/27/2022 reviewed in detail. Follow-up testing ordered today. The liver transaminases, ALP, liver function and platelet count are normal.     In retrospect the patient probably had alcohol induced liver injury/hepatitis and hepatic decompensation due to sepsis in 2017. The ultrasound suggests cirrhosis therefore will continue to monitor. The most recent ultrasound 1/2022 reported a normal appearing liver. If the ultrasound and laboratory testing remain normal will recommend he follow-up with PCP yearly. He does not currently have a PCP, advised he should get one. Elevated Serum Creatinine  Discussed mild elevation in Sr. Creat 1.39. He is currently being followed by Nephrology. Elevation in Ferritin  There is an elevation in ferritin with normal iron saturation in 2018. It is unlikely that the patient has hemochromatosis or is a carrier for an HFE gene with a normal saturation.   .    Treatment of other medical problems in patients with chronic liver disease  There are no contraindications for the patient to take most medications that are necessary for treatment of other medical issues. The patient has alcohol induced liver disease but has been abstinent from alcohol for greater than 6 months. Normal doses of acetaminophen, as recommended on the label of the bottle, can be utilized. Counseling for alcohol in patients with chronic liver disease  The patient was counseled regarding alcohol consumption and the effect of alcohol on chronic liver disease. The patient has not consumed alcohol since 8/2017. Screening for Hepatocellular Carcinoma  [facility]. screening was last performed with ultrasound 1/2022. AFP normal. Will order ultrasound and AFP today. Vaccinations   Vaccination for viral hepatitis A and B is recommended since the patient has no serologic evidence of previous exposure or vaccination with immunity. Routine vaccinations against other bacterial and viral agents can be performed as indicated. Annual flu vaccination should be administered if indicated. ALLERGIES  Allergies   Allergen Reactions    Aspirin Rash     MEDICATIONS  Current Outpatient Medications   Medication Sig    B.infantis-B.ani-B.long-B.bifi (PROBIOTIC 4X) 10-15 mg TbEC Take  by mouth. (Patient not taking: Reported on 6/27/2022)    therapeutic multivitamin (THERA) tablet Take 1 Tab by mouth daily. No current facility-administered medications for this visit. SYSTEM REVIEW NOT RELATED TO LIVER DISEASE OR REVIEWED ABOVE:  Constitution systems: Negative for fever, chills, weight gain, weight loss. Eyes: Negative for visual changes. ENT: Negative for sore throat, painful swallowing. Respiratory: Negative for cough, hemoptysis, SOB. Cardiology: Negative for chest pain, palpitations. GI:  Negative for constipation or diarrhea. : Negative for urinary frequency, dysuria, hematuria, nocturia. Skin: Negative for rash.   Hematology: Negative for easy bruising, blood clots. Musculo-skeletal: Negative for back pain, muscle pain, weakness. Neurologic: Negative for headaches, dizziness, vertigo, memory problems not related to HE. Psychology: Negative for anxiety, depression. FAMILY HISTORY:  The father  of prostate cancer. The mother is alive and healthy. There is no family history of liver disease. SOCIAL HISTORY:  The patient is . The patient has 2 children. The patient has never used tobacco products. The patient has previously consumed alcohol in excess. The patient has been abstinent from alcohol since 2017. The patient currently works full time for a [employer] company. PHYSICAL EXAMINATION:  Visit Vitals  /73 (BP 1 Location: Left upper arm, BP Patient Position: Sitting, BP Cuff Size: Adult long)   Pulse 93   Temp 97.2 °F (36.2 °C) (Temporal)   Ht 5' 6\" (1.676 m)   Wt 138 lb (62.6 kg)   SpO2 99%   BMI 22.27 kg/m²       General: No acute distress. Eyes: Sclera anicteric. ENT: No oral lesions. Thyroid normal.  Nodes: No adenopathy. Skin: No spider angiomata. No jaundice. No palmar erythema. Respiratory: Lungs clear to auscultation. Cardiovascular: Regular heart rate. No murmurs. No JVD. Abdomen: Soft non-tender, liver size normal to percussion/palpation. Spleen not palpable. No obvious ascites. Extremities: No edema. No muscle wasting. No gross arthritic changes. Neurologic: Alert and oriented. Cranial nerves grossly intact. No asterixis.     LABORATORY STUDIES:  Liver [place] of [address] Units 2021   WBC 3.4 - 10.8 x10E3/uL 3.3 (L) 3.6   ANC 1.4 - 7.0 x10E3/uL 1.7 1.7   HGB 13.0 - 17.7 g/dL 14.9 14.1    - 450 x10E3/uL 173 167   INR 0.9 - 1.2 1.1 1.1   AST 0 - 40 IU/L 23 23   ALT 0 - 44 IU/L 14 17   Alk Phos 44 - 121 IU/L 81 83   Bili, Total 0.0 - 1.2 mg/dL 0.9 0.8   Bili, Direct 0.00 - 0.40 mg/dL 0.25 0.21   Albumin 3.8 - 4.9 g/dL 4.9 4.6   BUN 6 - 24 mg/dL 18 16   Creat 0.76 - 1.27 mg/dL 1.53 (H) 1.35 (H)   Na 134 - 144 mmol/L 143 146 (H)   K 3.5 - 5.2 mmol/L 4.7 4.1   Cl 96 - 106 mmol/L 105 107 (H)   CO2 20 - 29 mmol/L 25 25   Glucose 65 - 99 mg/dL 77 82     Liver [place] of [address] [place] [address] Ref Rng & Units 6/23/2021   WBC 3.4 - 10.8 x10E3/uL 3.3 (L)   ANC 1.4 - 7.0 x10E3/uL 1.8   HGB 13.0 - 17.7 g/dL 13.4    - 450 x10E3/uL 171   INR 0.9 - 1.2 1.1   AST 0 - 40 IU/L 26   ALT 0 - 44 IU/L 17   Alk Phos 44 - 121 IU/L 80   Bili, Total 0.0 - 1.2 mg/dL 0.9   Bili, Direct 0.00 - 0.40 mg/dL 0.25   Albumin 3.8 - 4.9 g/dL 4.6   BUN 6 - 24 mg/dL 17   Creat 0.76 - 1.27 mg/dL 1.36 (H)   Na 134 - 144 mmol/L 146 (H)   K 3.5 - 5.2 mmol/L 3.9   Cl 96 - 106 mmol/L 108 (H)   CO2 20 - 29 mmol/L 22   Glucose 65 - 99 mg/dL 74     Cancer Screening Latest Ref Rng & Units 6/27/2022 12/28/2021 6/23/2021   AFP, Serum 0.0 - 8.4 ng/mL 1.0 1.0 1.0   AFP-L3% 0.0 - 9.9 % Comment Comment Comment     Laboratory testing from 6/27/2022 reviewed in detail. Additional testing included to evaluate progression or regression of disease. Laboratory testing results from today will be communicated by My Chart. SEROLOGIES:  Serologies Latest Ref Rng & Units 12/29/2017 8/24/2017   Hep A Ab, Total Negative Negative    Hep B Surface Ag Index  0.23   Hep B Surface Ag Interp NEG    NEGATIVE   Hep B Core Ab, Total Negative Negative    Hep B Surface AB QL  Non Reactive    Hep C Ab 0.0 - 0.9 s/co ratio <0.1    Hep C Ab NR    NONREACTIVE   [place] Ab, Direct Negative Negative    C-ANCA Neg:<1:20 titer <1:20    P-ANCA Neg:<1:20 titer <1:20    ANCA Neg:<1:20 titer <1:20    ASMCA 0 - 19 Units 19    M2 Ab 0.0 - 20.0 Units 31.0 (H)    Ceruloplasmin 16.0 - 31.0 mg/dL 20.2      Serologies Latest Ref Rng & Units 2/22/2018 1/31/2018 12/29/2017   Ferritin 30 - 400 ng/mL 659 (H) 781 (H) 833 (H)   Iron % Saturation 15 - 55 %   22     LIVER HISTOLOGY:  1/2018.  FibroScan performed at [facility]. EkPa was 22.8. IQR/med 16%. The results suggested a fibrosis level of F4. CAP is 242, not consistent with fatty liver disease. 6/2019. FibroScan performed at [facility]. EkPa was 8.7. IQR/med 10%. . The results suggested a fibrosis level of F2-3. The CAP score suggests no evidence of fatty liver.  6/2020. FibroScan performed at [facility]. EkPa was 6.6. IQR/med 9%. . The results suggested a fibrosis level of F0-1. The CAP score suggests there is no significant hepatic steatosis. ENDOSCOPIC PROCEDURES:  10/2017. Reports of an EGD without varices. The patient brought in discharge paperwork which includes physician findings. No mention of varices. 10/2017: Colonoscopy with Dr. Yonas Willis. Normal.     RADIOLOGY:  8/2017. CT scan abdomen with IV contrast. Normal appearing liver. No liver mass lesions. Normal spleen. Moderate ascites. 3/14/2018. Abdominal ultrasound. Normal hepatic echotexture with no focal mass or lesion in the liver. 11/19/2018. Abdominal ultrasound. Normal appeairng liver. No liver masses. No ascites. 11/2018. Ultrasound of liver. Echogenic consistent with chronic liver disease. No liver mass lesions. No dilated bile ducts. No ascites. 7/2019. Ultrasound of liver. Echogenic consistent with chronic liver disease. No liver mass or lesions. No dilated ducts. No ascites. 1/2020. Ultrasound of liver. Echogenic liver compatible with hepatic steatosis. No mass or lesion. No Splenomegaly. 6/2020. Ultrasound of liver. Echogenic consistent with cirrhosis. No ductal dilatation. No liver lesion or mass. No ascites. 1/2021. Ultrasound of liver. Mildly echogenic liver. No concerning mass or lesion. No ductal dilatation. No stones. No splenomegaly. 6/2021. Ultrasound of liver. Normal appearing liver. No liver mass lesions. 6 x 4 mm echogenic focus in gallbladder that may be a polyp. 1/2022. Ultrasound of liver. Normal appearing liver. No liver mass lesions. 7/2022. Ultrasound of liver. Normal appearing liver. No liver mass lesions. OTHER TESTING:  Not available or performed    FOLLOW-UP:  All of the issues listed above in the Assessment and Plan were discussed with the patient. All questions were answered. The patient expressed a clear understanding of the above. [facility] 87 in 1 year for ongoing monitoring and treatment. If the liver enzymes remain normal and the ultrasound suggests a normal appearing liver will discuss if further follow-up is needed. CODIE Roberts-[facility] of [address] [facility] [address] [address] [facility], [address] [facility], [facility] 22.  [facility]

## 2022-12-27 NOTE — PROGRESS NOTES
Identified pt with two pt identifiers(name and ). Reviewed record in preparation for visit and have obtained necessary documentation. Chief Complaint   Patient presents with    Follow-up      Vitals:    22 1003   BP: 111/73   Pulse: 93   Temp: 97.2 °F (36.2 °C)   TempSrc: Temporal   SpO2: 99%   Weight: 138 lb (62.6 kg)   Height: 5' 6\" (1.676 m)   PainSc:   0 - No pain       Health Maintenance Review: Patient reminded of \"due or due soon\" health maintenance. I have asked the patient to contact his/her primary care provider (PCP) for follow-up on his/her health maintenance. Coordination of Care Questionnaire:  :   1) Have you been to an emergency room, urgent care, or hospitalized since your last visit? If yes, where when, and reason for visit? no       2. Have seen or consulted any other health care provider since your last visit? If yes, where when, and reason for visit? NO      Patient is accompanied by self I have received verbal consent from Marcellus Cheek III to discuss any/all medical information while they are present in the room.

## 2022-12-28 LAB
ALBUMIN SERPL-MCNC: 4.7 G/DL (ref 3.8–4.9)
ALP SERPL-CCNC: 74 IU/L (ref 44–121)
ALT SERPL-CCNC: 15 IU/L (ref 0–44)
AST SERPL-CCNC: 21 IU/L (ref 0–40)
BASOPHILS # BLD AUTO: 0 X10E3/UL (ref 0–0.2)
BASOPHILS NFR BLD AUTO: 0 %
BILIRUB DIRECT SERPL-MCNC: 0.2 MG/DL (ref 0–0.4)
BILIRUB SERPL-MCNC: 0.9 MG/DL (ref 0–1.2)
BUN SERPL-MCNC: 14 MG/DL (ref 6–24)
BUN/CREAT SERPL: 11 (ref 9–20)
CALCIUM SERPL-MCNC: 9.2 MG/DL (ref 8.7–10.2)
CHLORIDE SERPL-SCNC: 104 MMOL/L (ref 96–106)
CO2 SERPL-SCNC: 26 MMOL/L (ref 20–29)
CREAT SERPL-MCNC: 1.33 MG/DL (ref 0.76–1.27)
EGFR: 65 ML/MIN/1.73
EOSINOPHIL # BLD AUTO: 0 X10E3/UL (ref 0–0.4)
EOSINOPHIL NFR BLD AUTO: 1 %
ERYTHROCYTE [DISTWIDTH] IN BLOOD BY AUTOMATED COUNT: 11.6 % (ref 11.6–15.4)
GLUCOSE SERPL-MCNC: 74 MG/DL (ref 70–99)
HCT VFR BLD AUTO: 42.3 % (ref 37.5–51)
HGB BLD-MCNC: 15 G/DL (ref 13–17.7)
IMM GRANULOCYTES # BLD AUTO: 0 X10E3/UL (ref 0–0.1)
IMM GRANULOCYTES NFR BLD AUTO: 0 %
INR PPP: 1.1 (ref 0.9–1.2)
LYMPHOCYTES # BLD AUTO: 1 X10E3/UL (ref 0.7–3.1)
LYMPHOCYTES NFR BLD AUTO: 35 %
MCH RBC QN AUTO: 32.8 PG (ref 26.6–33)
MCHC RBC AUTO-ENTMCNC: 35.5 G/DL (ref 31.5–35.7)
MCV RBC AUTO: 92 FL (ref 79–97)
MONOCYTES # BLD AUTO: 0.2 X10E3/UL (ref 0.1–0.9)
MONOCYTES NFR BLD AUTO: 7 %
NEUTROPHILS # BLD AUTO: 1.6 X10E3/UL (ref 1.4–7)
NEUTROPHILS NFR BLD AUTO: 57 %
PLATELET # BLD AUTO: 180 X10E3/UL (ref 150–450)
POTASSIUM SERPL-SCNC: 4.4 MMOL/L (ref 3.5–5.2)
PROT SERPL-MCNC: 7 G/DL (ref 6–8.5)
PROTHROMBIN TIME: 11.4 SEC (ref 9.1–12)
RBC # BLD AUTO: 4.58 X10E6/UL (ref 4.14–5.8)
SODIUM SERPL-SCNC: 141 MMOL/L (ref 134–144)
WBC # BLD AUTO: 2.8 X10E3/UL (ref 3.4–10.8)

## 2022-12-29 LAB
AFP L3 MFR SERPL: NORMAL % (ref 0–9.9)
AFP SERPL-MCNC: 1 NG/ML (ref 0–8.4)

## 2023-01-03 ENCOUNTER — HOSPITAL ENCOUNTER (OUTPATIENT)
Dept: ULTRASOUND IMAGING | Age: 53
Discharge: HOME | End: 2023-01-03
Attending: NURSE PRACTITIONER
Payer: COMMERCIAL

## 2023-01-03 DIAGNOSIS — K70.30 ALCOHOLIC CIRRHOSIS OF LIVER WITHOUT ASCITES (HCC): ICD-10-CM

## 2023-01-03 PROCEDURE — 76700 US EXAM ABDOM COMPLETE: CPT

## 2023-01-03 NOTE — PROGRESS NOTES
Letter sent regarding the ultrasound which shows no concerning liver mass or lesion. Cholelithiasis.

## 2023-01-03 NOTE — PROGRESS NOTES
Letter sent via my chart regarding the blood work results. The kidney numbers have improved slightly.  All other testing was normal.

## 2024-01-09 ENCOUNTER — OFFICE VISIT (OUTPATIENT)
Age: 54
End: 2024-01-09
Payer: COMMERCIAL

## 2024-01-09 VITALS
HEIGHT: 66 IN | WEIGHT: 131 LBS | TEMPERATURE: 97 F | BODY MASS INDEX: 21.05 KG/M2 | HEART RATE: 73 BPM | OXYGEN SATURATION: 100 % | SYSTOLIC BLOOD PRESSURE: 124 MMHG | DIASTOLIC BLOOD PRESSURE: 72 MMHG

## 2024-01-09 DIAGNOSIS — K70.30 ALCOHOLIC CIRRHOSIS OF LIVER WITHOUT ASCITES (HCC): Primary | ICD-10-CM

## 2024-01-09 DIAGNOSIS — N18.2 STAGE 2 CHRONIC KIDNEY DISEASE: ICD-10-CM

## 2024-01-09 LAB
ALBUMIN SERPL-MCNC: 4.4 G/DL (ref 3.5–5)
ALBUMIN/GLOB SERPL: 1.4 (ref 1.1–2.2)
ALP SERPL-CCNC: 85 U/L (ref 45–117)
ALT SERPL-CCNC: 19 U/L (ref 12–78)
ANION GAP SERPL CALC-SCNC: 2 MMOL/L (ref 5–15)
AST SERPL-CCNC: 18 U/L (ref 15–37)
BASOPHILS # BLD: 0 K/UL (ref 0–0.1)
BASOPHILS NFR BLD: 0 % (ref 0–1)
BILIRUB DIRECT SERPL-MCNC: 0.3 MG/DL (ref 0–0.2)
BILIRUB SERPL-MCNC: 1.1 MG/DL (ref 0.2–1)
BUN SERPL-MCNC: 16 MG/DL (ref 6–20)
BUN/CREAT SERPL: 12 (ref 12–20)
CALCIUM SERPL-MCNC: 8.9 MG/DL (ref 8.5–10.1)
CHLORIDE SERPL-SCNC: 108 MMOL/L (ref 97–108)
CO2 SERPL-SCNC: 30 MMOL/L (ref 21–32)
CREAT SERPL-MCNC: 1.36 MG/DL (ref 0.7–1.3)
DIFFERENTIAL METHOD BLD: ABNORMAL
EOSINOPHIL # BLD: 0 K/UL (ref 0–0.4)
EOSINOPHIL NFR BLD: 1 % (ref 0–7)
ERYTHROCYTE [DISTWIDTH] IN BLOOD BY AUTOMATED COUNT: 11.7 % (ref 11.5–14.5)
GLOBULIN SER CALC-MCNC: 3.1 G/DL (ref 2–4)
GLUCOSE SERPL-MCNC: 87 MG/DL (ref 65–100)
HCT VFR BLD AUTO: 39.5 % (ref 36.6–50.3)
HGB BLD-MCNC: 13.6 G/DL (ref 12.1–17)
IMM GRANULOCYTES # BLD AUTO: 0 K/UL (ref 0–0.04)
IMM GRANULOCYTES NFR BLD AUTO: 0 % (ref 0–0.5)
INR PPP: 1.1 (ref 0.9–1.1)
LYMPHOCYTES # BLD: 0.9 K/UL (ref 0.8–3.5)
LYMPHOCYTES NFR BLD: 29 % (ref 12–49)
MCH RBC QN AUTO: 32.9 PG (ref 26–34)
MCHC RBC AUTO-ENTMCNC: 34.4 G/DL (ref 30–36.5)
MCV RBC AUTO: 95.6 FL (ref 80–99)
MONOCYTES # BLD: 0.2 K/UL (ref 0–1)
MONOCYTES NFR BLD: 6 % (ref 5–13)
NEUTS SEG # BLD: 2 K/UL (ref 1.8–8)
NEUTS SEG NFR BLD: 64 % (ref 32–75)
NRBC # BLD: 0 K/UL (ref 0–0.01)
NRBC BLD-RTO: 0 PER 100 WBC
PLATELET # BLD AUTO: 202 K/UL (ref 150–400)
PMV BLD AUTO: 10.8 FL (ref 8.9–12.9)
POTASSIUM SERPL-SCNC: 4.2 MMOL/L (ref 3.5–5.1)
PROT SERPL-MCNC: 7.5 G/DL (ref 6.4–8.2)
PROTHROMBIN TIME: 11.3 SEC (ref 9–11.1)
RBC # BLD AUTO: 4.13 M/UL (ref 4.1–5.7)
SODIUM SERPL-SCNC: 140 MMOL/L (ref 136–145)
WBC # BLD AUTO: 3.1 K/UL (ref 4.1–11.1)

## 2024-01-09 PROCEDURE — 99214 OFFICE O/P EST MOD 30 MIN: CPT | Performed by: NURSE PRACTITIONER

## 2024-01-09 NOTE — PROGRESS NOTES
Identified pt with two pt identifiers(name and ). Reviewed record in preparation for visit and have obtained necessary documentation.    Chief Complaint   Patient presents with    Follow-up     /72 (Site: Left Upper Arm, Position: Sitting, Cuff Size: Medium Adult)   Pulse 73   Temp 97 °F (36.1 °C) (Temporal)   Ht 1.676 m (5' 6\")   Wt 59.4 kg (131 lb)   SpO2 100%   BMI 21.14 kg/m²       1. \"Have you been to the ER, urgent care clinic since your last visit?  Hospitalized since your last visit?\" No    2. \"Have you seen or consulted any other health care providers outside of the [facility] since your last visit?\" No     Patient is accompanied by self  I have received verbal consent from Jules Salomon III to discuss any/all medical information while they are present in the room.        
liver. Echogenic liver compatible with hepatic steatosis. No mass or lesion. No Splenomegaly.   6/2020. Ultrasound of liver. Echogenic consistent with cirrhosis. No ductal dilatation. No liver lesion or mass. No ascites.   1/2021. Ultrasound of liver. Mildly echogenic liver. No concerning mass or lesion. No ductal dilatation. No stones. No splenomegaly.   6/2021.  Ultrasound of liver.  Normal appearing liver.  No liver mass lesions. 6 x 4 mm echogenic focus in gallbladder that may be a polyp.   1/2022.  Ultrasound of liver.  Normal appearing liver.  No liver mass lesions.  7/2022.  Ultrasound of liver.  Normal appearing liver.  No liver mass lesions.  1/2023.  Ultrasound of liver.   Echogenic consistent with cirrhosis.  No liver mass lesions.  No dilated bile ducts.  No ascites.    OTHER TESTING:  Not available or performed    FOLLOW-UP:  All of the issues listed above in the Assessment and Plan were discussed with the patient.  All questions were answered.  The patient expressed a clear understanding of the above.    Follow-up Liver [facility] in 1 year for a Fibroscan.       BILLIE Baron  [facility] hospital Liver [address], [address]  [place]  [address] [phone]  [facility]

## 2024-01-12 LAB
AFP L3 MFR SERPL: NORMAL % (ref 0–9.9)
AFP SERPL-MCNC: 0.9 NG/ML (ref 0–8.4)

## 2024-02-08 ENCOUNTER — HOSPITAL ENCOUNTER (EMERGENCY)
Facility: HOSPITAL | Age: 54
Discharge: HOME | End: 2024-02-08
Attending: EMERGENCY MEDICINE
Payer: COMMERCIAL

## 2024-02-08 ENCOUNTER — TELEPHONE (OUTPATIENT)
Age: 54
End: 2024-02-08

## 2024-02-08 ENCOUNTER — APPOINTMENT (OUTPATIENT)
Facility: HOSPITAL | Age: 54
End: 2024-02-08
Attending: STUDENT IN AN ORGANIZED HEALTH CARE EDUCATION/TRAINING PROGRAM
Payer: COMMERCIAL

## 2024-02-08 ENCOUNTER — HOSPITAL ENCOUNTER (OUTPATIENT)
Facility: HOSPITAL | Age: 54
End: 2024-02-08
Payer: COMMERCIAL

## 2024-02-08 ENCOUNTER — APPOINTMENT (OUTPATIENT)
Facility: HOSPITAL | Age: 54
End: 2024-02-08
Payer: COMMERCIAL

## 2024-02-08 VITALS
TEMPERATURE: 98.4 F | OXYGEN SATURATION: 98 % | SYSTOLIC BLOOD PRESSURE: 119 MMHG | RESPIRATION RATE: 14 BRPM | HEART RATE: 56 BPM | WEIGHT: 134.48 LBS | DIASTOLIC BLOOD PRESSURE: 80 MMHG | BODY MASS INDEX: 21.11 KG/M2 | HEIGHT: 67 IN

## 2024-02-08 DIAGNOSIS — K70.30 ALCOHOLIC CIRRHOSIS OF LIVER WITHOUT ASCITES (HCC): ICD-10-CM

## 2024-02-08 DIAGNOSIS — I82.220 ACUTE DEEP VEIN THROMBOSIS (DVT) OF INFERIOR VENA CAVA (HCC): Primary | ICD-10-CM

## 2024-02-08 LAB
ALBUMIN SERPL-MCNC: 3.8 G/DL (ref 3.5–5)
ALBUMIN/GLOB SERPL: 1.2 (ref 1.1–2.2)
ALP SERPL-CCNC: 76 U/L (ref 45–117)
ALT SERPL-CCNC: 17 U/L (ref 12–78)
ANION GAP SERPL CALC-SCNC: 1 MMOL/L (ref 5–15)
APPEARANCE UR: CLEAR
APTT PPP: 27.3 SEC (ref 22.1–31)
APTT PPP: 27.9 SEC (ref 22.1–31)
AST SERPL-CCNC: 19 U/L (ref 15–37)
BACTERIA URNS QL MICRO: ABNORMAL /HPF
BASOPHILS # BLD: 0 K/UL (ref 0–0.1)
BASOPHILS NFR BLD: 0 % (ref 0–1)
BILIRUB SERPL-MCNC: 1.2 MG/DL (ref 0.2–1)
BILIRUB UR QL: NEGATIVE
BUN SERPL-MCNC: 12 MG/DL (ref 6–20)
BUN/CREAT SERPL: 9 (ref 12–20)
CALCIUM SERPL-MCNC: 8.8 MG/DL (ref 8.5–10.1)
CHLORIDE SERPL-SCNC: 112 MMOL/L (ref 97–108)
CO2 SERPL-SCNC: 24 MMOL/L (ref 21–32)
COLOR UR: ABNORMAL
COMMENT:: NORMAL
COMMENT:: NORMAL
CREAT SERPL-MCNC: 1.4 MG/DL (ref 0.7–1.3)
DIFFERENTIAL METHOD BLD: ABNORMAL
ECHO BSA: 1.7 M2
EOSINOPHIL # BLD: 0 K/UL (ref 0–0.4)
EOSINOPHIL NFR BLD: 1 % (ref 0–7)
EPITH CASTS URNS QL MICRO: ABNORMAL /LPF
ERYTHROCYTE [DISTWIDTH] IN BLOOD BY AUTOMATED COUNT: 11.4 % (ref 11.5–14.5)
ERYTHROCYTE [DISTWIDTH] IN BLOOD BY AUTOMATED COUNT: 11.4 % (ref 11.5–14.5)
GLOBULIN SER CALC-MCNC: 3.2 G/DL (ref 2–4)
GLUCOSE SERPL-MCNC: 96 MG/DL (ref 65–100)
GLUCOSE UR STRIP.AUTO-MCNC: NEGATIVE MG/DL
HCT VFR BLD AUTO: 39.4 % (ref 36.6–50.3)
HCT VFR BLD AUTO: 40.2 % (ref 36.6–50.3)
HGB BLD-MCNC: 13.9 G/DL (ref 12.1–17)
HGB BLD-MCNC: 13.9 G/DL (ref 12.1–17)
HGB UR QL STRIP: ABNORMAL
IMM GRANULOCYTES # BLD AUTO: 0 K/UL (ref 0–0.04)
IMM GRANULOCYTES NFR BLD AUTO: 0 % (ref 0–0.5)
INR PPP: 1.1 (ref 0.9–1.1)
INR PPP: 1.1 (ref 0.9–1.1)
KETONES UR QL STRIP.AUTO: NEGATIVE MG/DL
LEUKOCYTE ESTERASE UR QL STRIP.AUTO: NEGATIVE
LYMPHOCYTES # BLD: 1.1 K/UL (ref 0.8–3.5)
LYMPHOCYTES NFR BLD: 35 % (ref 12–49)
MCH RBC QN AUTO: 32.6 PG (ref 26–34)
MCH RBC QN AUTO: 33.4 PG (ref 26–34)
MCHC RBC AUTO-ENTMCNC: 34.6 G/DL (ref 30–36.5)
MCHC RBC AUTO-ENTMCNC: 35.3 G/DL (ref 30–36.5)
MCV RBC AUTO: 94.4 FL (ref 80–99)
MCV RBC AUTO: 94.7 FL (ref 80–99)
MONOCYTES # BLD: 0.2 K/UL (ref 0–1)
MONOCYTES NFR BLD: 5 % (ref 5–13)
NEUTS SEG # BLD: 1.8 K/UL (ref 1.8–8)
NEUTS SEG NFR BLD: 59 % (ref 32–75)
NITRITE UR QL STRIP.AUTO: NEGATIVE
NRBC # BLD: 0 K/UL (ref 0–0.01)
NRBC # BLD: 0 K/UL (ref 0–0.01)
NRBC BLD-RTO: 0 PER 100 WBC
NRBC BLD-RTO: 0 PER 100 WBC
PH UR STRIP: 8 (ref 5–8)
PLATELET # BLD AUTO: 166 K/UL (ref 150–400)
PLATELET # BLD AUTO: 181 K/UL (ref 150–400)
PMV BLD AUTO: 10.3 FL (ref 8.9–12.9)
PMV BLD AUTO: 9.4 FL (ref 8.9–12.9)
POTASSIUM SERPL-SCNC: 4.2 MMOL/L (ref 3.5–5.1)
PROT SERPL-MCNC: 7 G/DL (ref 6.4–8.2)
PROT UR STRIP-MCNC: NEGATIVE MG/DL
PROTHROMBIN TIME: 11.5 SEC (ref 9–11.1)
PROTHROMBIN TIME: 11.5 SEC (ref 9–11.1)
RBC # BLD AUTO: 4.16 M/UL (ref 4.1–5.7)
RBC # BLD AUTO: 4.26 M/UL (ref 4.1–5.7)
RBC #/AREA URNS HPF: ABNORMAL /HPF (ref 0–5)
SODIUM SERPL-SCNC: 137 MMOL/L (ref 136–145)
SP GR UR REFRACTOMETRY: 1.01 (ref 1–1.03)
SPECIMEN HOLD: NORMAL
THERAPEUTIC RANGE: NORMAL SECS (ref 58–77)
THERAPEUTIC RANGE: NORMAL SECS (ref 58–77)
UFH PPP CHRO-ACNC: <0.1 IU/ML
UROBILINOGEN UR QL STRIP.AUTO: 0.2 EU/DL (ref 0.2–1)
WBC # BLD AUTO: 3.1 K/UL (ref 4.1–11.1)
WBC # BLD AUTO: 3.1 K/UL (ref 4.1–11.1)
WBC URNS QL MICRO: ABNORMAL /HPF (ref 0–4)

## 2024-02-08 PROCEDURE — 85730 THROMBOPLASTIN TIME PARTIAL: CPT

## 2024-02-08 PROCEDURE — 96365 THER/PROPH/DIAG IV INF INIT: CPT

## 2024-02-08 PROCEDURE — 85520 HEPARIN ASSAY: CPT

## 2024-02-08 PROCEDURE — 80053 COMPREHEN METABOLIC PANEL: CPT

## 2024-02-08 PROCEDURE — 85027 COMPLETE CBC AUTOMATED: CPT

## 2024-02-08 PROCEDURE — 81270 JAK2 GENE: CPT

## 2024-02-08 PROCEDURE — 85610 PROTHROMBIN TIME: CPT

## 2024-02-08 PROCEDURE — 6360000004 HC RX CONTRAST MEDICATION: Performed by: RADIOLOGY

## 2024-02-08 PROCEDURE — 86147 CARDIOLIPIN ANTIBODY EA IG: CPT

## 2024-02-08 PROCEDURE — 85613 RUSSELL VIPER VENOM DILUTED: CPT

## 2024-02-08 PROCEDURE — 99285 EMERGENCY DEPT VISIT HI MDM: CPT

## 2024-02-08 PROCEDURE — 85670 THROMBIN TIME PLASMA: CPT

## 2024-02-08 PROCEDURE — 85025 COMPLETE CBC W/AUTO DIFF WBC: CPT

## 2024-02-08 PROCEDURE — 74178 CT ABD&PLV WO CNTR FLWD CNTR: CPT

## 2024-02-08 PROCEDURE — 81219 CALR GENE COM VARIANTS: CPT

## 2024-02-08 PROCEDURE — 96366 THER/PROPH/DIAG IV INF ADDON: CPT

## 2024-02-08 PROCEDURE — 85598 HEXAGNAL PHOSPH PLTLT NEUTRL: CPT

## 2024-02-08 PROCEDURE — 85597 PHOSPHOLIPID PLTLT NEUTRALIZ: CPT

## 2024-02-08 PROCEDURE — 81402 MOPATH PROCEDURE LEVEL 3: CPT

## 2024-02-08 PROCEDURE — 81001 URINALYSIS AUTO W/SCOPE: CPT

## 2024-02-08 PROCEDURE — 36415 COLL VENOUS BLD VENIPUNCTURE: CPT

## 2024-02-08 PROCEDURE — 6360000002 HC RX W HCPCS: Performed by: EMERGENCY MEDICINE

## 2024-02-08 PROCEDURE — 76700 US EXAM ABDOM COMPLETE: CPT

## 2024-02-08 PROCEDURE — 93970 EXTREMITY STUDY: CPT

## 2024-02-08 PROCEDURE — 96376 TX/PRO/DX INJ SAME DRUG ADON: CPT

## 2024-02-08 PROCEDURE — 85732 THROMBOPLASTIN TIME PARTIAL: CPT

## 2024-02-08 PROCEDURE — 86146 BETA-2 GLYCOPROTEIN ANTIBODY: CPT

## 2024-02-08 PROCEDURE — 99223 1ST HOSP IP/OBS HIGH 75: CPT | Performed by: INTERNAL MEDICINE

## 2024-02-08 RX ORDER — HEPARIN SODIUM 1000 [USP'U]/ML
80 INJECTION, SOLUTION INTRAVENOUS; SUBCUTANEOUS ONCE
Status: COMPLETED | OUTPATIENT
Start: 2024-02-08 | End: 2024-02-08

## 2024-02-08 RX ORDER — HEPARIN SODIUM 1000 [USP'U]/ML
40 INJECTION, SOLUTION INTRAVENOUS; SUBCUTANEOUS PRN
Status: DISCONTINUED | OUTPATIENT
Start: 2024-02-08 | End: 2024-02-08 | Stop reason: HOSPADM

## 2024-02-08 RX ORDER — HEPARIN SODIUM 1000 [USP'U]/ML
80 INJECTION, SOLUTION INTRAVENOUS; SUBCUTANEOUS PRN
Status: DISCONTINUED | OUTPATIENT
Start: 2024-02-08 | End: 2024-02-08 | Stop reason: HOSPADM

## 2024-02-08 RX ORDER — HEPARIN SODIUM 10000 [USP'U]/100ML
18-30 INJECTION, SOLUTION INTRAVENOUS CONTINUOUS
Status: DISCONTINUED | OUTPATIENT
Start: 2024-02-08 | End: 2024-02-08 | Stop reason: HOSPADM

## 2024-02-08 RX ADMIN — HEPARIN SODIUM 4880 UNITS: 1000 INJECTION INTRAVENOUS; SUBCUTANEOUS at 16:46

## 2024-02-08 RX ADMIN — IOPAMIDOL 100 ML: 755 INJECTION, SOLUTION INTRAVENOUS at 18:10

## 2024-02-08 RX ADMIN — HEPARIN SODIUM 18 UNITS/KG/HR: 10000 INJECTION, SOLUTION INTRAVENOUS at 16:49

## 2024-02-08 ASSESSMENT — ENCOUNTER SYMPTOMS
COLOR CHANGE: 0
STRIDOR: 0
ABDOMINAL PAIN: 0
COUGH: 0
VOMITING: 0
SHORTNESS OF BREATH: 0
BACK PAIN: 0
EYES NEGATIVE: 1
RHINORRHEA: 0
DIARRHEA: 0
TROUBLE SWALLOWING: 0
SINUS PRESSURE: 0
BLOOD IN STOOL: 0
NAUSEA: 0
SINUS PAIN: 0
SORE THROAT: 0
CHEST TIGHTNESS: 0
WHEEZING: 0

## 2024-02-08 ASSESSMENT — PAIN SCALES - GENERAL: PAINLEVEL_OUTOF10: 0

## 2024-02-08 ASSESSMENT — PAIN - FUNCTIONAL ASSESSMENT: PAIN_FUNCTIONAL_ASSESSMENT: NONE - DENIES PAIN

## 2024-02-08 NOTE — ED TRIAGE NOTES
Triage: Pt arrives ambulatory from home with CC of having had an abdominal ultrasound which had an incidental finding of a non occlusive thrombus of the IVC. Pt denies any symptoms. The patient had the ultrasound ordered by his hepatologist.

## 2024-02-08 NOTE — TELEPHONE ENCOUNTER
Contacted by Sudheer at radiology that Mr. Salomon had a new 2 cm IVC thrombus incidentally noted on RUQ US, I advised he proceed to ER for coordination of care for anticoagulation/hematology f/u.

## 2024-02-08 NOTE — CONSULTS
Cancer [place] at [place]  [address] [facility] [address], [address] [facility] [address]  W: [phone]  F: [phone]    Reason for Evaluation:   Jules Salomon III is a 54 y.o. male with alcoholic liver disease who is seen in consultation at the request of Dr. Banuelos for evaluation of IVC thrombus.    History of Present Illness:   Jules Salomon III is a pleasant 54 y.o. male who presents today for evaluation of IVC thrombus.    Patient was seen by Hepatology for follow up of alcoholic liver disease. Patient had repeat ultrasound done today, 2/8, that showed stable mildly heterogenous liver and an incidental non-occlusive thrombus in the infrahepatic IVC. Labs show normal platelet count. Last INR 1.1 on 1/9/24. Repeat coags pending today.     He denies any leg pain, leg swelling, abdominal pain, scrotal swelling, chest pain, shortness of breath, pleuritic pain, cough, changes in urination. He says if he has not had the ultrasound, he would not have been aware of the thrombus. He did have COVID infection right before [place], managed at home.     Never smoker.  No recent EtOH use  No family history of VTE. Has 1 brother, 1 sister, and 2 children.  No recent surgery, immobility, long-distance travel.  He is up to date on his colonoscopy.     Review of systems was obtained and pertinent findings reviewed above. Past medical history, social history, family history, medications, and allergies are located in the electronic medical record.    Physical Exam:   BP (!) 143/91   Pulse 60   Temp 98.3 °F (36.8 °C) (Oral)   Resp 16   Ht 1.702 m (5' 7\")   Wt 61 kg (134 lb 7.7 oz)   SpO2 100%   BMI 21.06 kg/m²     General: no distress, pleasant  Eyes: anicteric sclerae  HENT: oropharynx clear  Neck: supple  Respiratory: normal respiratory effort, TAB  CV: no peripheral edema  Ext: warm, well perfused, no edema  GI: soft, nontender, nondistended, no masses, bowel sounds present  Skin: no rashes, no ecchymoses, no

## 2024-02-08 NOTE — ED PROVIDER NOTES
[facility] EMERGENCY DEP  EMERGENCY DEPARTMENT ENCOUNTER      Pt Name: Jules Salomon III  MRN: [id number]  Birthdate 1970  Date of evaluation: 2/8/2024  Provider: Man Banuelos MD    CHIEF COMPLAINT       Chief Complaint   Patient presents with    Abnormal Ultrasound         HISTORY OF PRESENT ILLNESS    HPI  This is a patient who has a history of alcoholic cirrhosis.  He is followed by Gabrielle Vivas with hepatology.  He had a ultrasound done today and was noted to have an incidental finding of a nonocclusive thrombus in the inferior vena cava.  He was sent to the hospital for further evaluation and treatment.  The patient voices no complaint.  He denies any abdominal pain, shortness of breath, fever or chills and no difficulty with bowels or bladder.  He is having no acute symptoms and is lower extremities either.  He states \"I feel normal\".  He has no history of any clots in the past.  He has no family history of clotting disorders.  He is not on any medications he states.    Review of External Medical Records:     Nursing Notes were reviewed.    REVIEW OF SYSTEMS       Review of Systems   Constitutional:  Negative for activity change, chills, fatigue and fever.   HENT:  Negative for congestion, ear pain, rhinorrhea, sinus pressure, sinus pain, sore throat and trouble swallowing.    Eyes: Negative.    Respiratory:  Negative for cough, chest tightness, shortness of breath, wheezing and stridor.    Cardiovascular:  Negative for chest pain, palpitations and leg swelling.   Gastrointestinal:  Negative for abdominal pain, blood in stool, diarrhea, nausea and vomiting.   Endocrine: Negative.    Genitourinary:  Negative for decreased urine volume, difficulty urinating, flank pain, frequency and hematuria.   Musculoskeletal:  Negative for back pain, joint swelling and neck pain.   Skin:  Negative for color change, pallor and rash.   Neurological:  Negative for dizziness, syncope, speech difficulty, weakness,  definition of the CT finding is made. Will also discuss with the vascular surgeon. Patient is in no acute distress in the ED.     Amount and/or Complexity of Data Reviewed  Labs: ordered. Decision-making details documented in ED Course.  Radiology: ordered. Decision-making details documented in ED Course.    Risk  Prescription drug management.            REASSESSMENT     ED Course as of 02/08/24 1407   Thu Feb 08, 2024   1905 I received no response from nurse practitioner Elizabeth at this point.  I called the office and spoke with the  he gave me the number for Dr. Hernández who apparently knows about this patient and I am awaiting a call back from her.  Patient is stable [RP]      ED Course User Index  [RP] Man Banuelos MD     2:07 PM I was able to contact Dr. Hernández who is covering for Dr. Frost in the liver [place].  She tells me that she did not feel that this clot had anything to do with his liver disease but sent him to the hospital for coordination of care.  She apparently had spoken to Dr. Divina Francois for hematology.  I placed a call with her to see whether she would like the patient admitted for IV anticoagulation    A heparin drip has been initiated.    We are still attempting to contact vascular surgery for this patient.  Dr. Mondragon has seen this patient and would like to have a CTA of the abdomen done as well as duplex of both lower extremities.  If those are negative and the vascular surgeon does not want to do any procedure at this time, the patient    The patient is signed over to on coming physician at change of shift pending studies per Dr. Mondragon. I have spoken with Dr. Deluca and he did not feel there was anything for them to do at this point other than anticoagulation.     Total critical care time (not including time spent performing separately reportable procedures): 45 minutes    CONSULTS:  None    PROCEDURES:  Unless otherwise noted below, none

## 2024-02-08 NOTE — ED PROVIDER NOTES
ED SIGN OUT NOTE  Care assumed at [facility] 4:19 PM EST    Patient was signed out to me by Dr. Banuelos.     Patient is awaiting CTA abd pelvis and BLE duplex.    CTA abd pelvis read as \"nonvisualization of the infrahepatic IVC thrombus seen on the prior ultrasound, performed February 8, 2024. 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    Celiac vessel findings discussed with Dr. Deluca with vascular surgery, who recommended no acute intervention, and no need for follow-up if patient otherwise asymptomatic. Will continue with plan to anticoagulate patient with eliquis in the outpatient setting per original physician's plan. Patient will follow up with hematology for ongoing care. He felt comfortable with plan for discharge.    /80   Pulse 56   Temp 98.4 °F (36.9 °C) (Oral)   Resp 14   Ht 1.702 m (5' 7\")   Wt 61 kg (134 lb 7.7 oz)   SpO2 98%   BMI 21.06 kg/m²     Labs Reviewed   COMPREHENSIVE METABOLIC PANEL - Abnormal; Notable for the following components:       Result Value    Chloride 112 (*)     Anion Gap 1 (*)     Creatinine 1.40 (*)     Bun/Cre Ratio 9 (*)     Total Bilirubin 1.2 (*)     All other components within normal limits   CBC WITH AUTO DIFFERENTIAL - Abnormal; Notable for the following components:    WBC 3.1 (*)     RDW 11.4 (*)     All other components within normal limits   URINALYSIS WITH MICROSCOPIC - Abnormal; Notable for the following components:    Blood, Urine TRACE (*)     BACTERIA, URINE 1+ (*)     All other components within normal limits   PROTIME-INR - Abnormal; Notable for the following components:    Protime 11.5 (*)     All other components within normal limits   CBC - Abnormal; Notable for the following components:    WBC 3.1 (*)     RDW 11.4 (*)     All other components within normal limits   PROTIME-INR - Abnormal; Notable for the following components:    Protime 11.5 (*)     All other components within normal limits   URINE

## 2024-02-08 NOTE — TELEPHONE ENCOUNTER
2/8/24@1033 [facility] Radiology dept called Anu to made our office aware of the new finding. Patient has blood clot non-inclusive in the IVC. Spoke w/Yelena--she refer Anu to doctor on call for  while out of town. Phone number to Mariluz Hernández given to Anu. (KF)

## 2024-02-09 ENCOUNTER — TELEPHONE (OUTPATIENT)
Age: 54
End: 2024-02-09

## 2024-02-09 NOTE — TELEPHONE ENCOUNTER
Patient was seen in the ER and consult by Dr. Mondragon.  He received a RX of Elquis but the pharmacy doesn't have it ( [facility]) but he also indicated that it was very expenses and wanted to know if their was something else he could uses.  Please advises       # [phone]

## 2024-02-09 NOTE — ED NOTES
Report given to SONIA Means  
The patient left the Emergency Department ambulatory, alert and oriented and in no acute distress. The patient was encouraged to call or return to the ED for worsening issues or problems and was encouraged to schedule a follow up appointment for continuing care.      The patient verbalized understanding of discharge instructions and prescriptions, all questions were answered. The patient has no further concerns at this time.        
2.11

## 2024-02-12 LAB — ECHO BSA: 1.7 M2

## 2024-02-16 LAB
APTT HEX PL PPP: 6 SEC
APTT IMM NP PPP: NORMAL SEC
APTT PPP 1:1 SALINE: NORMAL SEC
APTT PPP: 30 SEC
B2 GLYCOPROT1 IGA SER-ACNC: <10 SAU
B2 GLYCOPROT1 IGG SER-ACNC: <10 SGU
B2 GLYCOPROT1 IGM SER-ACNC: <10 SMU
CALR %: NORMAL %
CALR AMINO ACID: NORMAL
CALR NUCLEOTIDE: NORMAL
CALR RESULT: NORMAL
CARDIOLIPIN IGG SER IA-ACNC: <10 GPL
CARDIOLIPIN IGM SER IA-ACNC: <10 MPL
CONFIRM APTT: 0 SEC
CONFIRM DRVVT: NORMAL SEC
DIRECTOR REVIEW: 489204: NORMAL
DIRECTOR REVIEW: NORMAL
INR PPP: 1.1 RATIO
JAK2 P.V617F BLD/T QL: NORMAL
JAK2 V617F %: NORMAL %
JAK2 V617F RESULT: NORMAL
LABORATORY COMMENT REPORT: NORMAL
LABORATORY COMMENT REPORT: NORMAL
Lab: NORMAL
Lab: NORMAL
MPL RESULT: NORMAL
PROTHROMBIN TIME: 11.9 SEC
REFLEX: NORMAL
SCREEN DRVVT/NORMAL: NORMAL RATIO
SCREEN DRVVT: 38.8 SEC
THROMBIN TIME: 18.2 SEC
V617F REFLES CALR/MPL BACKGROUND: NORMAL

## 2024-02-27 ENCOUNTER — TELEPHONE (OUTPATIENT)
Age: 54
End: 2024-02-27

## 2024-02-28 ENCOUNTER — CLINICAL DOCUMENTATION (OUTPATIENT)
Age: 54
End: 2024-02-28

## 2024-02-28 DIAGNOSIS — I77.1 STENOSIS OF CELIAC ARTERY (HCC): Primary | ICD-10-CM

## 2024-03-07 ENCOUNTER — TELEPHONE (OUTPATIENT)
Age: 54
End: 2024-03-07

## 2024-03-07 NOTE — TELEPHONE ENCOUNTER
Patient called in regards to order placed for an MRI. Has not had MRI done yet. Wants to discuss before scheduling one.

## 2024-03-23 ENCOUNTER — HOSPITAL ENCOUNTER (OUTPATIENT)
Facility: HOSPITAL | Age: 54
End: 2024-03-23
Payer: COMMERCIAL

## 2024-03-23 DIAGNOSIS — I77.1 STENOSIS OF CELIAC ARTERY (HCC): ICD-10-CM

## 2024-03-23 PROCEDURE — 2580000003 HC RX 258: Performed by: NURSE PRACTITIONER

## 2024-03-23 PROCEDURE — A9579 GAD-BASE MR CONTRAST NOS,1ML: HCPCS | Performed by: NURSE PRACTITIONER

## 2024-03-23 PROCEDURE — 74183 MRI ABD W/O CNTR FLWD CNTR: CPT

## 2024-03-23 PROCEDURE — 6360000004 HC RX CONTRAST MEDICATION: Performed by: NURSE PRACTITIONER

## 2024-03-23 RX ORDER — 0.9 % SODIUM CHLORIDE 0.9 %
100 INTRAVENOUS SOLUTION INTRAVENOUS ONCE
Status: COMPLETED | OUTPATIENT
Start: 2024-03-23 | End: 2024-03-23

## 2024-03-23 RX ORDER — SODIUM CHLORIDE 0.9 % (FLUSH) 0.9 %
10 SYRINGE (ML) INJECTION ONCE
Status: COMPLETED | OUTPATIENT
Start: 2024-03-23 | End: 2024-03-23

## 2024-03-23 RX ADMIN — GADOTERIDOL 13 ML: 279.3 INJECTION, SOLUTION INTRAVENOUS at 11:50

## 2024-03-23 RX ADMIN — SODIUM CHLORIDE 100 ML: 900 INJECTION, SOLUTION INTRAVENOUS at 11:51

## 2024-03-23 RX ADMIN — SODIUM CHLORIDE, PRESERVATIVE FREE 10 ML: 5 INJECTION INTRAVENOUS at 11:51

## 2024-04-22 ENCOUNTER — TELEPHONE (OUTPATIENT)
Age: 54
End: 2024-04-22

## 2024-06-13 LAB
CALR RESULT: NORMAL
DIRECTOR REVIEW: 489204: NORMAL
DIRECTOR REVIEW: NORMAL
JAK2 P.V617F BLD/T QL: NORMAL
JAK2 V617F RESULT: NORMAL
LABORATORY COMMENT REPORT: NORMAL
Lab: NORMAL
Lab: NORMAL
MPL RESULT: NORMAL
REFLEX: NORMAL
V617F REFLES CALR/MPL BACKGROUND: NORMAL

## 2025-01-06 ENCOUNTER — OFFICE VISIT (OUTPATIENT)
Age: 55
End: 2025-01-06
Payer: COMMERCIAL

## 2025-01-06 VITALS
WEIGHT: 139.8 LBS | OXYGEN SATURATION: 99 % | TEMPERATURE: 98.5 F | SYSTOLIC BLOOD PRESSURE: 120 MMHG | DIASTOLIC BLOOD PRESSURE: 70 MMHG | BODY MASS INDEX: 21.94 KG/M2 | HEIGHT: 67 IN | HEART RATE: 79 BPM

## 2025-01-06 DIAGNOSIS — N18.2 STAGE 2 CHRONIC KIDNEY DISEASE: ICD-10-CM

## 2025-01-06 DIAGNOSIS — K70.30 ALCOHOLIC CIRRHOSIS OF LIVER WITHOUT ASCITES (HCC): Primary | ICD-10-CM

## 2025-01-06 PROCEDURE — 99214 OFFICE O/P EST MOD 30 MIN: CPT | Performed by: NURSE PRACTITIONER

## 2025-01-06 PROCEDURE — 91200 LIVER ELASTOGRAPHY: CPT | Performed by: NURSE PRACTITIONER

## 2025-01-06 RX ORDER — MULTIVITAMIN,THERAPEUTIC
1 TABLET ORAL DAILY
COMMUNITY

## 2025-01-06 ASSESSMENT — PATIENT HEALTH QUESTIONNAIRE - PHQ9
SUM OF ALL RESPONSES TO PHQ9 QUESTIONS 1 & 2: 0
SUM OF ALL RESPONSES TO PHQ QUESTIONS 1-9: 0
1. LITTLE INTEREST OR PLEASURE IN DOING THINGS: NOT AT ALL
SUM OF ALL RESPONSES TO PHQ QUESTIONS 1-9: 0
2. FEELING DOWN, DEPRESSED OR HOPELESS: NOT AT ALL
DEPRESSION UNABLE TO ASSESS: FUNCTIONAL CAPACITY MOTIVATION LIMITS ACCURACY

## 2025-01-06 ASSESSMENT — ANXIETY QUESTIONNAIRES
2. NOT BEING ABLE TO STOP OR CONTROL WORRYING: NOT AT ALL
GAD7 TOTAL SCORE: 0
3. WORRYING TOO MUCH ABOUT DIFFERENT THINGS: NOT AT ALL
4. TROUBLE RELAXING: NOT AT ALL
IF YOU CHECKED OFF ANY PROBLEMS ON THIS QUESTIONNAIRE, HOW DIFFICULT HAVE THESE PROBLEMS MADE IT FOR YOU TO DO YOUR WORK, TAKE CARE OF THINGS AT HOME, OR GET ALONG WITH OTHER PEOPLE: NOT DIFFICULT AT ALL
1. FEELING NERVOUS, ANXIOUS, OR ON EDGE: NOT AT ALL
5. BEING SO RESTLESS THAT IT IS HARD TO SIT STILL: NOT AT ALL
7. FEELING AFRAID AS IF SOMETHING AWFUL MIGHT HAPPEN: NOT AT ALL
6. BECOMING EASILY ANNOYED OR IRRITABLE: NOT AT ALL

## 2025-01-06 NOTE — PROGRESS NOTES
[facility]      Alber Benton MD, FACP, FACG, FAASLD      NEFTALI CarmichaelC    Gabrielle Vivas, [facility]   Sugar Camejo, [facility]   Yelena Tyler, [facility]  Titus Hope, [facility]   Lidia Astudillo, [facility]   Suzanne Mcdanielon, [facility]   [address], [address]   [place]  [address] [phone]   FAX: [phone]  [facility]   [address], [address]   [place]  [address] [phone]   FAX: [phone]     Patient Care Team:  Gabrielle Vivas APRN - NP as PCP - General (Hepatology)    Patient Active Problem List   Diagnosis    Thrombocytopenia (HCC)    Cirrhosis (HCC)    Stage 2 chronic kidney disease       Jules Salomon III \"Kaveh\" returns to the [facility] for management of 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 The active problem list, all pertinent past medical history, medications, liver histology, endoscopic studies, radiologic findings and laboratory findings related to the liver disorder were reviewed with the patient.      The patient is a 55 y.o. Black male who was first found to have chronic liver disease and cirrhosis due to alcohol in 2017 when he was hospitalized for sepsis. He has remained abstinent from alcohol since 8/2017.     Since the last office visit the patient has felt well overall with no new complications of liver disease.  He returns for yearly FibroScan due to the question of him having cirrhosis.  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    The patient reports no current symptoms of liver disease. He denies fatigue, pain in the right side over the liver, itching, or swelling of the abdomen. There are no functional limitation in completing activities of daily

## 2025-01-06 NOTE — PROGRESS NOTES
Chief Complaint   Patient presents with    Follow-up     N/A     Vitals:    01/06/25 0958   BP: 120/70   Site: Left Upper Arm   Position: Sitting   Pulse: 79   Temp: 98.5 °F (36.9 °C)   TempSrc: Temporal   SpO2: 99%   Weight: 63.4 kg (139 lb 12.8 oz)   Height: 1.702 m (5' 7\")     .  \"Have you been to the ER, urgent care clinic since your last visit?  Hospitalized since your last visit?\"    NO    “Have you seen or consulted any other health care providers outside of [facility] since your last visit?”    NO        “Have you had a colorectal cancer screening such as a colonoscopy/FIT/Cologuard?    NO    No colonoscopy on file  No cologuard on file  No FIT/FOBT on file   No flexible sigmoidoscopy on file         Click Here for Release of Records Request

## 2025-01-07 LAB
ALBUMIN SERPL-MCNC: 4.4 G/DL (ref 3.8–4.9)
ALP SERPL-CCNC: 82 IU/L (ref 44–121)
ALT SERPL-CCNC: 15 IU/L (ref 0–44)
AST SERPL-CCNC: 19 IU/L (ref 0–40)
BASOPHILS # BLD AUTO: 0 X10E3/UL (ref 0–0.2)
BASOPHILS NFR BLD AUTO: 1 %
BILIRUB SERPL-MCNC: 0.8 MG/DL (ref 0–1.2)
BUN SERPL-MCNC: 17 MG/DL (ref 6–24)
BUN/CREAT SERPL: 12 (ref 9–20)
CALCIUM SERPL-MCNC: 9.3 MG/DL (ref 8.7–10.2)
CHLORIDE SERPL-SCNC: 105 MMOL/L (ref 96–106)
CO2 SERPL-SCNC: 26 MMOL/L (ref 20–29)
CREAT SERPL-MCNC: 1.41 MG/DL (ref 0.76–1.27)
EGFRCR SERPLBLD CKD-EPI 2021: 59 ML/MIN/1.73
EOSINOPHIL # BLD AUTO: 0.1 X10E3/UL (ref 0–0.4)
EOSINOPHIL NFR BLD AUTO: 3 %
ERYTHROCYTE [DISTWIDTH] IN BLOOD BY AUTOMATED COUNT: 11.4 % (ref 11.6–15.4)
GLOBULIN SER CALC-MCNC: 2.1 G/DL (ref 1.5–4.5)
GLUCOSE SERPL-MCNC: 80 MG/DL (ref 70–99)
HCT VFR BLD AUTO: 40.5 % (ref 37.5–51)
HGB BLD-MCNC: 13.7 G/DL (ref 13–17.7)
IMM GRANULOCYTES # BLD AUTO: 0 X10E3/UL (ref 0–0.1)
IMM GRANULOCYTES NFR BLD AUTO: 0 %
INR PPP: 1.1 (ref 0.9–1.2)
LYMPHOCYTES # BLD AUTO: 1.1 X10E3/UL (ref 0.7–3.1)
LYMPHOCYTES NFR BLD AUTO: 34 %
MCH RBC QN AUTO: 33.1 PG (ref 26.6–33)
MCHC RBC AUTO-ENTMCNC: 33.8 G/DL (ref 31.5–35.7)
MCV RBC AUTO: 98 FL (ref 79–97)
MONOCYTES # BLD AUTO: 0.2 X10E3/UL (ref 0.1–0.9)
MONOCYTES NFR BLD AUTO: 5 %
NEUTROPHILS # BLD AUTO: 1.8 X10E3/UL (ref 1.4–7)
NEUTROPHILS NFR BLD AUTO: 57 %
PLATELET # BLD AUTO: 186 X10E3/UL (ref 150–450)
POTASSIUM SERPL-SCNC: 4.1 MMOL/L (ref 3.5–5.2)
PROT SERPL-MCNC: 6.5 G/DL (ref 6–8.5)
PROTHROMBIN TIME: 12.1 SEC (ref 9.1–12)
RBC # BLD AUTO: 4.14 X10E6/UL (ref 4.14–5.8)
SODIUM SERPL-SCNC: 143 MMOL/L (ref 134–144)
WBC # BLD AUTO: 3.2 X10E3/UL (ref 3.4–10.8)

## 2025-01-08 LAB
AFP L3 MFR SERPL: NORMAL % (ref 0–9.9)
AFP SERPL-MCNC: 1 NG/ML (ref 0–8.4)